# Patient Record
Sex: FEMALE | Race: WHITE | Employment: UNEMPLOYED | URBAN - METROPOLITAN AREA
[De-identification: names, ages, dates, MRNs, and addresses within clinical notes are randomized per-mention and may not be internally consistent; named-entity substitution may affect disease eponyms.]

---

## 2020-07-13 ENCOUNTER — APPOINTMENT (OUTPATIENT)
Dept: GENERAL RADIOLOGY | Age: 55
End: 2020-07-13
Payer: COMMERCIAL

## 2020-07-13 ENCOUNTER — HOSPITAL ENCOUNTER (EMERGENCY)
Age: 55
Discharge: HOME | End: 2020-07-13
Attending: EMERGENCY MEDICINE
Payer: COMMERCIAL

## 2020-07-13 ENCOUNTER — APPOINTMENT (OUTPATIENT)
Dept: CT IMAGING | Age: 55
End: 2020-07-13
Payer: COMMERCIAL

## 2020-07-13 VITALS
TEMPERATURE: 97.8 F | BODY MASS INDEX: 45.52 KG/M2 | WEIGHT: 290 LBS | OXYGEN SATURATION: 98 % | RESPIRATION RATE: 16 BRPM | HEART RATE: 86 BPM | SYSTOLIC BLOOD PRESSURE: 149 MMHG | DIASTOLIC BLOOD PRESSURE: 84 MMHG | HEIGHT: 67 IN

## 2020-07-13 PROCEDURE — 73610 X-RAY EXAM OF ANKLE: CPT

## 2020-07-13 PROCEDURE — 73060 X-RAY EXAM OF HUMERUS: CPT

## 2020-07-13 PROCEDURE — 29105 APPLICATION LONG ARM SPLINT: CPT

## 2020-07-13 PROCEDURE — 99283 EMERGENCY DEPT VISIT LOW MDM: CPT

## 2020-07-13 PROCEDURE — 6360000002 HC RX W HCPCS: Performed by: STUDENT IN AN ORGANIZED HEALTH CARE EDUCATION/TRAINING PROGRAM

## 2020-07-13 PROCEDURE — 72131 CT LUMBAR SPINE W/O DYE: CPT

## 2020-07-13 PROCEDURE — 73110 X-RAY EXAM OF WRIST: CPT

## 2020-07-13 PROCEDURE — 73630 X-RAY EXAM OF FOOT: CPT

## 2020-07-13 PROCEDURE — 71250 CT THORAX DX C-: CPT

## 2020-07-13 PROCEDURE — 96372 THER/PROPH/DIAG INJ SC/IM: CPT

## 2020-07-13 PROCEDURE — 73090 X-RAY EXAM OF FOREARM: CPT

## 2020-07-13 RX ORDER — KETOROLAC TROMETHAMINE 30 MG/ML
15 INJECTION, SOLUTION INTRAMUSCULAR; INTRAVENOUS ONCE
Status: DISCONTINUED | OUTPATIENT
Start: 2020-07-13 | End: 2020-07-13

## 2020-07-13 RX ORDER — KETOROLAC TROMETHAMINE 30 MG/ML
30 INJECTION, SOLUTION INTRAMUSCULAR; INTRAVENOUS ONCE
Status: COMPLETED | OUTPATIENT
Start: 2020-07-13 | End: 2020-07-13

## 2020-07-13 RX ADMIN — KETOROLAC TROMETHAMINE 30 MG: 30 INJECTION, SOLUTION INTRAMUSCULAR at 19:15

## 2020-07-13 ASSESSMENT — ENCOUNTER SYMPTOMS
SHORTNESS OF BREATH: 0
VISUAL CHANGE: 0
COUGH: 0
BACK PAIN: 1
VOMITING: 0

## 2020-07-13 ASSESSMENT — PAIN SCALES - GENERAL
PAINLEVEL_OUTOF10: 10
PAINLEVEL_OUTOF10: 10

## 2020-07-13 ASSESSMENT — PAIN DESCRIPTION - LOCATION: LOCATION: ARM

## 2020-07-13 ASSESSMENT — PAIN DESCRIPTION - PAIN TYPE: TYPE: ACUTE PAIN

## 2020-07-13 NOTE — ED PROVIDER NOTES
Fall   The accident occurred less than 1 hour ago. Fall occurred: Down stairs. She fell from a height of 3 to 5 ft. She landed on a hard floor. The point of impact was the head, right elbow, right wrist, right knee and right shoulder. The pain is present in the right shoulder, right wrist and right elbow. The pain is at a severity of 6/10. The pain is moderate. She was ambulatory at the scene. There was no entrapment after the fall. There was no drug use involved in the accident. There was no alcohol use involved in the accident. Pertinent negatives include no visual change, no fever, no vomiting, no hematuria, no headaches and no loss of consciousness. Review of Systems   Constitutional: Negative for fever. HENT: Negative for congestion. Eyes: Negative for visual disturbance. Respiratory: Negative for cough and shortness of breath. Cardiovascular: Negative for chest pain. Gastrointestinal: Negative for vomiting. Genitourinary: Negative for hematuria. Musculoskeletal: Positive for back pain. Tenderness along right arm-elbow humerus and shoulder- and left middle toe   Skin: Negative for rash. Allergic/Immunologic: Negative for immunocompromised state. Neurological: Negative for loss of consciousness and headaches. Physical Exam  Constitutional:       Appearance: Normal appearance. She is obese. HENT:      Head: Normocephalic and atraumatic. Right Ear: External ear normal.      Left Ear: External ear normal.      Nose: Nose normal.      Mouth/Throat:      Mouth: Mucous membranes are moist.   Eyes:      Conjunctiva/sclera: Conjunctivae normal.      Pupils: Pupils are equal, round, and reactive to light. Neck:      Musculoskeletal: Normal range of motion and neck supple. Cardiovascular:      Rate and Rhythm: Normal rate and regular rhythm. Pulses: Normal pulses. Heart sounds: Normal heart sounds.    Pulmonary:      Effort: Pulmonary effort is normal. Mita Sharma is a 60 y.o. female whom is in no distress. Physical exam reveals tenderness to right shoulder neurovascularly intact distally. Tenderness to left lower back but no midline tenderness no signs of trauma. My plan: Symptomatic and supportive care. Electronically signed by aPvel Dorsey DO on 7/13/20 at 7:30 PM EDT          [MF]   2026 Splint in place patient with good cap refill strength and 2+ radial pulse    [MF]      ED Course User Index  [MF] Pavel Dorsey DO        ED Course as of Jul 13 2028   Mon Jul 13, 2020   1930   ATTENDING PROVIDER ATTESTATION:     I have personally performed and/or participated in the history, exam, medical decision making, and procedures and agree with all pertinent clinical information unless otherwise noted. I have also reviewed and agree with the past medical, family and social history unless otherwise noted. I have discussed this patient in detail with the resident and provided the instruction and education regarding the evidence-based evaluation and treatment of [unfilled]  History: 57-year-old female who presents for evaluation after a mechanical fall down the steps in her home. She denies loss of consciousness. Greatest complaint now is pain to her right shoulder as well as some left lateral back pain    My findings: Mita Sharma is a 60 y.o. female whom is in no distress. Physical exam reveals tenderness to right shoulder neurovascularly intact distally. Tenderness to left lower back but no midline tenderness no signs of trauma. My plan: Symptomatic and supportive care.      Electronically signed by Pavel Dorsey DO on 7/13/20 at 7:30 PM EDT          [MF]   2026 Splint in place patient with good cap refill strength and 2+ radial pulse    [MF]      ED Course User Index  [MF] Pavel Dorsey DO       --------------------------------------------- PAST HISTORY ---------------------------------------------  Past Medical fracture in the proximal ulna. If indicated,   radiographs of the right elbow without for better visualization of   this region. ------------------------- NURSING NOTES AND VITALS REVIEWED ---------------------------  Date / Time Roomed:  7/13/2020  5:24 PM  ED Bed Assignment:  02/02    The nursing notes within the ED encounter and vital signs as below have been reviewed. BP (!) 149/84   Pulse 86   Temp 97.8 °F (36.6 °C) (Oral)   Resp 16   Ht 5' 7\" (1.702 m)   Wt 290 lb (131.5 kg)   SpO2 98%   BMI 45.42 kg/m²   Oxygen Saturation Interpretation: Normal      ------------------------------------------ PROGRESS NOTES ------------------------------------------  8:28 PM EDT  I have spoken with the patient and discussed todays results, in addition to providing specific details for the plan of care and counseling regarding the diagnosis and prognosis. Their questions are answered at this time and they are agreeable with the plan. I discussed at length with them reasons for immediate return here for re evaluation. They will followup with their orthopedic physician by calling their office tomorrow. --------------------------------- ADDITIONAL PROVIDER NOTES ---------------------------------  At this time the patient is without objective evidence of an acute process requiring hospitalization or inpatient management. They have remained hemodynamically stable throughout their entire ED visit and are stable for discharge with outpatient follow-up. The plan has been discussed in detail and they are aware of the specific conditions for emergent return, as well as the importance of follow-up. New Prescriptions    No medications on file       Diagnosis:  1. Fall, initial encounter    2. Pain of right upper extremity    3. Closed fracture of right elbow, initial encounter        Disposition:  Patient's disposition: Discharge to home  Patient's condition is stable.     The patient was seen and evaluated by Dr. Gene Moon and myself.         Rosibel Baca MD  Resident  07/13/20 1458

## 2020-07-13 NOTE — ED NOTES
Bed: 02  Expected date:   Expected time:   Means of arrival:   Comments:  ems     Griselda Craig RN  07/13/20 1098

## 2020-07-14 ENCOUNTER — TELEPHONE (OUTPATIENT)
Dept: ORTHOPEDIC SURGERY | Age: 55
End: 2020-07-14

## 2020-07-14 NOTE — TELEPHONE ENCOUNTER
Pt called for [facility] ED f/u for possible rt elbow fx  Tiny age-indeterminate fracture in the proximal ulna. If indicated,    radiographs of the right elbow without for better visualization of    this region.       Please call pt with appt info: [phone]

## 2020-07-14 NOTE — TELEPHONE ENCOUNTER
Pt seen in ED 7/13 after fall down stairs. XR R radius/ulna:  Impression         Tiny age-indeterminate fracture in the proximal ulna.  If indicated,    radiographs of the right elbow without for better visualization of    this region.

## 2020-07-15 NOTE — TELEPHONE ENCOUNTER
Call placed to pt, appt made for 7/29 at 9:00. Pt verbally confirmed appt date and time. Directions to office given.

## 2020-07-29 ENCOUNTER — HOSPITAL ENCOUNTER (OUTPATIENT)
Dept: GENERAL RADIOLOGY | Age: 55
Discharge: HOME | End: 2020-07-31
Payer: COMMERCIAL

## 2020-07-29 ENCOUNTER — OFFICE VISIT (OUTPATIENT)
Dept: ORTHOPEDIC SURGERY | Age: 55
End: 2020-07-29
Payer: COMMERCIAL

## 2020-07-29 VITALS
DIASTOLIC BLOOD PRESSURE: 89 MMHG | WEIGHT: 290 LBS | BODY MASS INDEX: 45.52 KG/M2 | HEART RATE: 75 BPM | HEIGHT: 67 IN | SYSTOLIC BLOOD PRESSURE: 137 MMHG

## 2020-07-29 PROCEDURE — 3017F COLORECTAL CA SCREEN DOC REV: CPT | Performed by: PHYSICIAN ASSISTANT

## 2020-07-29 PROCEDURE — 73080 X-RAY EXAM OF ELBOW: CPT

## 2020-07-29 PROCEDURE — 99202 OFFICE O/P NEW SF 15 MIN: CPT | Performed by: PHYSICIAN ASSISTANT

## 2020-07-29 PROCEDURE — 1036F TOBACCO NON-USER: CPT | Performed by: PHYSICIAN ASSISTANT

## 2020-07-29 PROCEDURE — G8417 CALC BMI ABV UP PARAM F/U: HCPCS | Performed by: PHYSICIAN ASSISTANT

## 2020-07-29 PROCEDURE — 73090 X-RAY EXAM OF FOREARM: CPT

## 2020-07-29 PROCEDURE — 99203 OFFICE O/P NEW LOW 30 MIN: CPT | Performed by: PHYSICIAN ASSISTANT

## 2020-07-29 PROCEDURE — G8427 DOCREV CUR MEDS BY ELIG CLIN: HCPCS | Performed by: PHYSICIAN ASSISTANT

## 2020-07-29 NOTE — PATIENT INSTRUCTIONS
OK to use right arm for light daily activities and hygiene. No lifting/pushing/pulling with right arm above 1-2 lbs  ACE wrap for comfort  Start outpatient physical therapy  Over the counter Tylenol or Ibuprofen as needed for discomfort    Ice to areas of pain or swelling but make sure to have a barrier on the ice    PT options for you close to home:    [facility]  [address] [facility], [address] [facility]  TEL: [phone]  FAX: [facility] [address]  PHONE: [phone]  FAX: [address] [place] [address] Rd  [address] [place], [address]  [phone]    [facility]  [address] [place], [address] [place]: [phone]  FX: [phone]

## 2020-07-31 NOTE — PROGRESS NOTES
New Patient Orthopaedic Progress Note    Rosalba Mendez is a 60 y.o. female, her YOB: 1965 with the following history as recorded in [facility]:      Patient Active Problem List    Diagnosis Date Noted    Chest pain 02/04/2012    Obesity 02/04/2012    Hypokalemia 02/04/2012     Current Outpatient Medications   Medication Sig Dispense Refill    meloxicam (MOBIC) 7.5 MG tablet   0    metFORMIN (GLUCOPHAGE) 500 MG tablet Take 500 mg by mouth 2 times daily (with meals)      atenolol (TENORMIN) 25 MG tablet Take 25 mg by mouth daily      pantoprazole (PROTONIX) 40 MG tablet take 1 tablet by mouth once daily  0    PARoxetine (PAXIL) 10 MG tablet take 1 tablet by mouth once daily  0    Cyanocobalamin (VITAMIN B 12 PO) Take by mouth daily      acetaminophen (TYLENOL) 325 MG tablet Take 2 tablets by mouth every 4 hours as needed for Pain (For mild pain level 1-3 or for fever > 100.5).  methscopolamine (PAMINE FORTE) 2.5 MG TABS Take 2.5 mg by mouth daily. No current facility-administered medications for this visit. Allergies: Demerol hcl [meperidine]  Past Medical History:   Diagnosis Date    Diabetes mellitus ([address]     GERD (gastroesophageal reflux disease)     Hypertension     IBS (irritable bowel syndrome)      Past Surgical History:   Procedure Laterality Date    CHOLECYSTECTOMY  11/11/2011    FOOT SURGERY      FOOT SURGERY  03/2019    plantar faschia    HEEL SPUR SURGERY  12/2016    JOINT REPLACEMENT      toe joints replacement 3/2017 and 9/2017    JOINT REPLACEMENT      both big toes    PARTIAL HYSTERECTOMY       Family History   Problem Relation Age of Onset    Alzheimer's Disease Father     Heart Disease Mother     Emphysema Mother     Other Other         RESPIRATORY DISEASE     Social History     Tobacco Use    Smoking status: Never Smoker    Smokeless tobacco: Never Used   Substance Use Topics    Alcohol use:  No                             Chief Complaint   Patient presents with   [facility] ED Follow-up     ED F/u R debo cantrell fell down steps on 7/13        SUBJECTIVE: Paulina Lozano is here for initial evaluation for their right elbow. States that they had injured it after falling down stiars. DOI: 7/13/2020. Was seen in ER and XRs revealed a minimally displaced coronoid fracture. they were placed in a long posterior splint and referred here. Denies any other injuries, and no issues with this elbow prior to injury. Denies any numbness or tingling. Pain tolerable currently with medications. Patient states the pain is markedly improved since initial date of injury. Review of Systems   Constitutional: Negative for fever, chills, diaphoresis, appetite change and fatigue. HENT: Negative for dental issues, hearing loss and tinnitus. Negative for congestion, sinus pressure, sneezing, sore throat. Negative for headache. Eyes: Negative for visual disturbance, blurred and double vision. Negative for pain, discharge, redness and itching  Respiratory: Negative for cough, shortness of breath and wheezing. Cardiovascular: Negative for chest pain, palpitations and leg swelling. No dyspnea on exertion   Gastrointestinal:   Negative for nausea, vomiting, abdominal pain, diarrhea, constipation  or black or bloody. Hematologic\Lymphatic:  negative for bleeding, petechiae,   Genitourinary: Negative for hematuria and difficulty urinating. Musculoskeletal: Negative for neck pain and stiffness. Negative for back pain, see HPI  Skin: Negative for pallor, rash and wound. Neurological: Negative for dizziness, tremors, seizures, weakness, light-headedness, no TIA or stroke symptoms. No numbness and headaches. Psychiatric/Behavioral: Negative. OBJECTIVE:      Physical Examination:   General appearance: alert, well appearing, and in no distress,  normal appearing weight.  No visible signs of trauma   Mental status: alert, oriented to person, place, and time, normal mood, behavior, speech, dress, motor activity, and thought processes  Abdomen: soft, nondistended  Resp:   resp easy and unlabored, no audible wheezes note, normal symmetrical expansion of both hemithoraces  Cardiac: distal pulses palpable, skin and extremities well perfused  Neurological: alert, oriented X3, normal speech, no focal findings or movement disorder noted, motor and sensory grossly normal bilaterally, normal muscle tone, no tremors, strength 5/5, normal gait and station  HEENT: normochephalic atraumatic, external ears and eyes normal, sclera normal, neck supple, no nasal discharge. Extremities:   peripheral pulses normal, no edema, redness or tenderness in the calves   Skin: normal coloration, no rashes or open wounds, no suspicious skin lesions noted  Psych: Affect euthymic   Musculoskeletal:   Extremity:  Right Upper Extremity  Skin is clean dry and intact  Mild edema noted to the elbow, no ecchymosis or skin breakdown  Radial pulse palpable, fingers warm with BCR  Flex/extension intact to wrist, thumb and fingers  Finger opposition intact  Finger adduction/abduction intact  Finger crossover intact  Subjectively states sensation intact to radial/medial/ulnar distribution  Active range of motion of the elbow 0-130  Mild discomfort at terminal range of motion  Full supination and pronation. No instability with varus and valgus stress at the elbow       /89 (Site: Left Lower Arm, Position: Sitting)   Pulse 75   Ht 5' 7\" (1.702 m)   Wt 290 lb (131.5 kg)   BMI 45.42 kg/m²      XR: Multiple views of the right radius/ulna and right elbow demonstrates an avulsion fracture the ulnar aspect of the coronoid, no interval displacement in this. This appears to have interval healing from initial x-rays from the ER. No other acute osseous abnormalities identified. ASSESSMENT:     Diagnosis Orders   1.  Closed fracture of proximal end of right ulna, unspecified fracture morphology, initial encounter Amb External Referral To Physical Therapy       Discussion/Plan Had lengthy discussion with patient regarding Her diagnosis, typical prognosis, and expected outcomes. I reviewed the possible complications from the injury itself despite treatment choosen. Closed treatment is recommended, discussed limited restrictions for lifting, pushing, pulling, okay to advance range of motion is stable on exam today. Will refer to physical therapy. Patient is agreeable. Patient to continue with over-the-counter Tylenol or ibuprofen as needed for discomfort, ice and elevation as needed for soreness and swelling. She is agreeable this plan of care and all questions were answered bedside. Electronically signed by Burgess Fareed PA-C on 7/31/2020 at 9:39 AM  Note: This report was completed using [employer] voiced recognition software. Every effort has been made to ensure accuracy; however, inadvertent computerized transcription errors may be present.

## 2020-08-19 ENCOUNTER — OFFICE VISIT (OUTPATIENT)
Dept: ORTHOPEDIC SURGERY | Age: 55
End: 2020-08-19
Payer: COMMERCIAL

## 2020-08-19 VITALS
DIASTOLIC BLOOD PRESSURE: 84 MMHG | TEMPERATURE: 97.6 F | WEIGHT: 290 LBS | SYSTOLIC BLOOD PRESSURE: 148 MMHG | HEIGHT: 67 IN | BODY MASS INDEX: 45.52 KG/M2

## 2020-08-19 PROCEDURE — 1036F TOBACCO NON-USER: CPT | Performed by: PHYSICIAN ASSISTANT

## 2020-08-19 PROCEDURE — G8417 CALC BMI ABV UP PARAM F/U: HCPCS | Performed by: PHYSICIAN ASSISTANT

## 2020-08-19 PROCEDURE — 3017F COLORECTAL CA SCREEN DOC REV: CPT | Performed by: PHYSICIAN ASSISTANT

## 2020-08-19 PROCEDURE — 99213 OFFICE O/P EST LOW 20 MIN: CPT | Performed by: PHYSICIAN ASSISTANT

## 2020-08-19 PROCEDURE — G8427 DOCREV CUR MEDS BY ELIG CLIN: HCPCS | Performed by: PHYSICIAN ASSISTANT

## 2020-08-19 PROCEDURE — 99212 OFFICE O/P EST SF 10 MIN: CPT | Performed by: PHYSICIAN ASSISTANT

## 2020-08-21 NOTE — PROGRESS NOTES
Chief Complaint   Patient presents with    Follow-up     F/u R prox ulna fx doi 7/13 fell down steps. states she is not doing therapy d/t insurance issues       SUBJECTIVE: Gordon Vo is a 60 y.o. female who presents to office for follow-up on her right elbow. Her injury was sustained on 7/13/2020 when she had fallen down the stairs on an outstretched arm. Patient had sustained an avulsion fracture of the proximal ulna at the coronoid process without subsequent instability of her elbow. Patient is now 5 weeks from initial date of injury. She feels she is doing well. Her pain continues to improve. She does note increased tenderness and sensitivity when she rests her right elbow on hard surfaces. She denies numbness and tingling to the right upper extremity. She states that her range of motion is significantly improved. She has started to use the arm for light ADLs. States that she has tried to lift heavier objects such as a gallon of milk but this is still painful. Patient was prescribed occupational therapy at the last office visit. She is not attended due to insurance issues and feels she is progressing well on her own and would like to hold off on therapy at this time. .    Review of Systems   Constitutional: Negative for fever, chills, diaphoresis, appetite change and fatigue. HENT: Negative for dental issues, hearing loss and tinnitus. Negative for congestion, sinus pressure, sneezing, sore throat. Negative for headache. Eyes: Negative for visual disturbance, blurred and double vision. Negative for pain, discharge, redness and itching  Respiratory: Negative for cough, shortness of breath and wheezing. Cardiovascular: Negative for chest pain, palpitations and leg swelling. No dyspnea on exertion   Gastrointestinal:   Negative for nausea, vomiting, abdominal pain, diarrhea, constipation  or black or bloody.   Hematologic\Lymphatic:  negative for bleeding, petechiae,   Genitourinary: Negative for hematuria and difficulty urinating. Musculoskeletal: Negative for neck pain and stiffness. Negative for back pain, see HPI  Skin: Negative for pallor, rash and wound. Neurological: Negative for dizziness, tremors, seizures, weakness, light-headedness, no TIA or stroke symptoms. No numbness and headaches. Psychiatric/Behavioral: Negative. OBJECTIVE:      Physical Examination:   General appearance: alert, well appearing, and in no distress,  normal appearing weight. No visible signs of trauma   Mental status: alert, oriented to person, place, and time, normal mood, behavior, speech, dress, motor activity, and thought processes  Abdomen: soft, nondistended  Resp:   resp easy and unlabored, no audible wheezes note, normal symmetrical expansion of both hemithoraces  Cardiac: distal pulses palpable, skin and extremities well perfused  Neurological: alert, oriented X3, normal speech, no focal findings or movement disorder noted, motor and sensory grossly normal bilaterally, normal muscle tone, no tremors, strength 5/5, normal gait and station  HEENT: normochephalic atraumatic, external ears and eyes normal, sclera normal, neck supple, no nasal discharge. Extremities:   peripheral pulses normal, no edema, redness or tenderness in the calves   Skin: normal coloration, no rashes or open wounds, no suspicious skin lesions noted  Psych: Affect euthymic   Musculoskeletal:   Extremity:  Right Upper Extremity  Skin is clean dry and intact  Edema has resolved  Radial pulse palpable, fingers warm with BCR  Flex/extension intact to wrist, thumb and fingers  Finger opposition intact  Finger adduction/abduction intact  Finger crossover intact  Subjectively states sensation intact to radial/medial/ulnar distribution  Active range of motion of the elbow 0-130 without pain  Mild tenderness palpation about the proximal ulna on the medial aspect over area of prior coronoid fracture  Full supination and pronation.   No instability with varus and valgus stress at the elbow    BP (!) 148/84   Temp 97.6 °F (36.4 °C)   Ht 5' 7\" (1.702 m)   Wt 290 lb (131.5 kg)   BMI 45.42 kg/m²      No new x-rays were obtained today     ASSESSMENT:     Diagnosis Orders   1. Closed fracture of proximal end of right ulna with routine healing, unspecified fracture morphology, subsequent encounter       PLAN:  Discussed with the patient her physical exam findings, her range of motion is now pain-free but continues to have mild sensitivity at the area of coronoid fracture. There is no instability with valgus stress of the elbow at 0 degrees or 30 degrees of flexion  Discussed with the patient to continue to modify some activity to the right upper extremity and progress with pain as her guide  Recommended to continue to use over-the-counter Tylenol or ibuprofen as needed for discomfort  Recommended to continue with ice for after increased activity. Patient expressed understanding, I am agreeable with holding off on therapy as she is been progressing very well on her own. Patient will follow-up with orthopedics on an as-needed basis and call with any questions or concerns. Electronically signed by Amber Ross PA-C on 8/21/2020 at 8:33 AM  Note: This report was completed using computerMedisync [employer] voiced recognition software. Every effort has been made to ensure accuracy; however, inadvertent computerized transcription errors may be present.

## 2021-03-06 ENCOUNTER — APPOINTMENT (OUTPATIENT)
Dept: GENERAL RADIOLOGY | Age: 56
End: 2021-03-06
Payer: COMMERCIAL

## 2021-03-06 ENCOUNTER — HOSPITAL ENCOUNTER (EMERGENCY)
Age: 56
Discharge: HOME | End: 2021-03-06
Attending: EMERGENCY MEDICINE
Payer: COMMERCIAL

## 2021-03-06 VITALS
TEMPERATURE: 97.5 F | RESPIRATION RATE: 16 BRPM | BODY MASS INDEX: 45.99 KG/M2 | SYSTOLIC BLOOD PRESSURE: 135 MMHG | DIASTOLIC BLOOD PRESSURE: 82 MMHG | OXYGEN SATURATION: 98 % | HEIGHT: 67 IN | HEART RATE: 82 BPM | WEIGHT: 293 LBS

## 2021-03-06 DIAGNOSIS — R10.12 LEFT UPPER QUADRANT ABDOMINAL PAIN: Primary | ICD-10-CM

## 2021-03-06 DIAGNOSIS — R10.13 ABDOMINAL PAIN, EPIGASTRIC: ICD-10-CM

## 2021-03-06 LAB
ALBUMIN SERPL-MCNC: 4.1 G/DL (ref 3.5–5.2)
ALP BLD-CCNC: 115 U/L (ref 35–104)
ALT SERPL-CCNC: 26 U/L (ref 0–32)
ANION GAP SERPL CALCULATED.3IONS-SCNC: 9 MMOL/L (ref 7–16)
AST SERPL-CCNC: 24 U/L (ref 0–31)
BASOPHILS ABSOLUTE: 0.03 E9/L (ref 0–0.2)
BASOPHILS RELATIVE PERCENT: 0.4 % (ref 0–2)
BILIRUB SERPL-MCNC: 0.3 MG/DL (ref 0–1.2)
BUN BLDV-MCNC: 19 MG/DL (ref 6–20)
CALCIUM SERPL-MCNC: 9.1 MG/DL (ref 8.6–10.2)
CHLORIDE BLD-SCNC: 103 MMOL/L (ref 98–107)
CO2: 28 MMOL/L (ref 22–29)
CREAT SERPL-MCNC: 1.2 MG/DL (ref 0.5–1)
EKG ATRIAL RATE: 78 BPM
EKG P AXIS: 40 DEGREES
EKG P-R INTERVAL: 150 MS
EKG Q-T INTERVAL: 424 MS
EKG QRS DURATION: 86 MS
EKG QTC CALCULATION (BAZETT): 483 MS
EKG R AXIS: 26 DEGREES
EKG T AXIS: 36 DEGREES
EKG VENTRICULAR RATE: 78 BPM
EOSINOPHILS ABSOLUTE: 0.13 E9/L (ref 0.05–0.5)
EOSINOPHILS RELATIVE PERCENT: 1.7 % (ref 0–6)
GFR AFRICAN AMERICAN: 56
GFR NON-AFRICAN AMERICAN: 46 ML/MIN/1.73
GLUCOSE BLD-MCNC: 136 MG/DL (ref 74–99)
HCT VFR BLD CALC: 43.3 % (ref 34–48)
HEMOGLOBIN: 13.1 G/DL (ref 11.5–15.5)
IMMATURE GRANULOCYTES #: 0.02 E9/L
IMMATURE GRANULOCYTES %: 0.3 % (ref 0–5)
LIPASE: 28 U/L (ref 13–60)
LYMPHOCYTES ABSOLUTE: 1.71 E9/L (ref 1.5–4)
LYMPHOCYTES RELATIVE PERCENT: 21.8 % (ref 20–42)
MCH RBC QN AUTO: 27.9 PG (ref 26–35)
MCHC RBC AUTO-ENTMCNC: 30.3 % (ref 32–34.5)
MCV RBC AUTO: 92.1 FL (ref 80–99.9)
MONOCYTES ABSOLUTE: 0.57 E9/L (ref 0.1–0.95)
MONOCYTES RELATIVE PERCENT: 7.3 % (ref 2–12)
NEUTROPHILS ABSOLUTE: 5.38 E9/L (ref 1.8–7.3)
NEUTROPHILS RELATIVE PERCENT: 68.5 % (ref 43–80)
PDW BLD-RTO: 14.9 FL (ref 11.5–15)
PLATELET # BLD: 265 E9/L (ref 130–450)
PMV BLD AUTO: 10.8 FL (ref 7–12)
POTASSIUM REFLEX MAGNESIUM: 4.4 MMOL/L (ref 3.5–5)
RBC # BLD: 4.7 E12/L (ref 3.5–5.5)
SODIUM BLD-SCNC: 140 MMOL/L (ref 132–146)
TOTAL PROTEIN: 7 G/DL (ref 6.4–8.3)
TROPONIN: <0.01 NG/ML (ref 0–0.03)
WBC # BLD: 7.8 E9/L (ref 4.5–11.5)

## 2021-03-06 PROCEDURE — 83690 ASSAY OF LIPASE: CPT

## 2021-03-06 PROCEDURE — 99284 EMERGENCY DEPT VISIT MOD MDM: CPT

## 2021-03-06 PROCEDURE — 85025 COMPLETE CBC W/AUTO DIFF WBC: CPT

## 2021-03-06 PROCEDURE — 96361 HYDRATE IV INFUSION ADD-ON: CPT

## 2021-03-06 PROCEDURE — 96360 HYDRATION IV INFUSION INIT: CPT

## 2021-03-06 PROCEDURE — 80053 COMPREHEN METABOLIC PANEL: CPT

## 2021-03-06 PROCEDURE — 2580000003 HC RX 258: Performed by: STUDENT IN AN ORGANIZED HEALTH CARE EDUCATION/TRAINING PROGRAM

## 2021-03-06 PROCEDURE — 93005 ELECTROCARDIOGRAM TRACING: CPT | Performed by: STUDENT IN AN ORGANIZED HEALTH CARE EDUCATION/TRAINING PROGRAM

## 2021-03-06 PROCEDURE — 36415 COLL VENOUS BLD VENIPUNCTURE: CPT

## 2021-03-06 PROCEDURE — 71045 X-RAY EXAM CHEST 1 VIEW: CPT

## 2021-03-06 PROCEDURE — 6370000000 HC RX 637 (ALT 250 FOR IP): Performed by: STUDENT IN AN ORGANIZED HEALTH CARE EDUCATION/TRAINING PROGRAM

## 2021-03-06 PROCEDURE — 84484 ASSAY OF TROPONIN QUANT: CPT

## 2021-03-06 RX ORDER — 0.9 % SODIUM CHLORIDE 0.9 %
500 INTRAVENOUS SOLUTION INTRAVENOUS ONCE
Status: COMPLETED | OUTPATIENT
Start: 2021-03-06 | End: 2021-03-06

## 2021-03-06 RX ORDER — SUCRALFATE ORAL 1 G/10ML
1 SUSPENSION ORAL 4 TIMES DAILY
Qty: 1200 ML | Refills: 0 | Status: SHIPPED | OUTPATIENT
Start: 2021-03-06 | End: 2021-04-05

## 2021-03-06 RX ORDER — FAMOTIDINE 20 MG/1
20 TABLET, FILM COATED ORAL ONCE
Status: COMPLETED | OUTPATIENT
Start: 2021-03-06 | End: 2021-03-06

## 2021-03-06 RX ADMIN — FAMOTIDINE 20 MG: 20 TABLET ORAL at 16:12

## 2021-03-06 RX ADMIN — LIDOCAINE HYDROCHLORIDE: 20 SOLUTION ORAL; TOPICAL at 16:12

## 2021-03-06 RX ADMIN — SODIUM CHLORIDE 500 ML: 9 INJECTION, SOLUTION INTRAVENOUS at 16:14

## 2021-03-06 ASSESSMENT — ENCOUNTER SYMPTOMS
EYE DISCHARGE: 0
EYE PAIN: 0
COUGH: 0
WHEEZING: 0
EYE REDNESS: 0
SINUS PRESSURE: 0
ABDOMINAL PAIN: 1
SHORTNESS OF BREATH: 0
VOMITING: 0
DIARRHEA: 1
BLOOD IN STOOL: 0
BACK PAIN: 0
SORE THROAT: 0
NAUSEA: 0

## 2021-03-06 ASSESSMENT — PAIN DESCRIPTION - PAIN TYPE: TYPE: ACUTE PAIN

## 2021-03-06 NOTE — ED PROVIDER NOTES
pulses. Heart sounds: Normal heart sounds. Pulmonary:      Effort: Pulmonary effort is normal.      Breath sounds: Normal breath sounds. Abdominal:      General: Abdomen is flat. Bowel sounds are normal.      Palpations: Abdomen is soft. Tenderness: There is abdominal tenderness (Left upper quadrant epigastric tenderness with deep palpation) in the epigastric area and left upper quadrant. Musculoskeletal: Normal range of motion. Skin:     General: Skin is warm and dry. Capillary Refill: Capillary refill takes less than 2 seconds. Neurological:      Mental Status: She is alert. Procedures     MDM     ED Course as of Mar 06 2152   Sat Mar 06, 2021   1705 She has had pain mildly improved with GI cocktail and fluids. CBC, CMP, lipase, troponin, EKG and chest x-ray were all negative. Acute intra-abdominal or cardiac process less likely. Patient symptoms seem to worsen after eating, consistent with possible gastritis or ulcer type picture considering she has some diarrhea associated with it as well. She is already on pantoprazole at home and sees Dr. Soledad Diaz from gastroenterology and her last EGD was three years ago. Will discharge patient home on Carafate, and have her increase her dose of Protonix as well as follow-up with him. Return precautions given. [JG]   5161 ATTENDING PHYSICIAN ATTESTATION:     I have personally performed and/or participated in the history, exam, medical decision making, and procedures and agree with all pertinent clinical information. I have also reviewed and agree with the past medical, family and social history unless otherwise noted. I have discussed this patient in detail with the resident, and provided the instruction and education regarding abdominal pain. My findings/Plan: 58-year-old female presents for evaluation of left upper quadrant abdominal pain for several weeks that until today only heard briefly after eating.   Today the pain became constant. Patient is reported history of GERD for which she takes pantoprazole. She reports she is followed by Dr. Maite Hewitt and her last EGD was 3 years ago. Patient with left upper quadrant tenderness minor vague abdominal exam unremarkable labs and imaging unremarkable. Symptoms better after GI cocktail likely gastritis/peptic ulcer disease. Patient discharged home adding on medications with close follow-up with gastroenterology and return precautions          [MF]      ED Course User Index  [JG] Farheen Blanchard MD  [MF] Kerry Albarran DO        ED Course as of Mar 06 2152   Sat Mar 06, 2021   1705 She has had pain mildly improved with GI cocktail and fluids. CBC, CMP, lipase, troponin, EKG and chest x-ray were all negative. Acute intra-abdominal or cardiac process less likely. Patient symptoms seem to worsen after eating, consistent with possible gastritis or ulcer type picture considering she has some diarrhea associated with it as well. She is already on pantoprazole at home and sees Dr. Austin Mcneil from gastroenterology and her last EGD was three years ago. Will discharge patient home on Carafate, and have her increase her dose of Protonix as well as follow-up with him. Return precautions given. [JG]   7823 ATTENDING PHYSICIAN ATTESTATION:     I have personally performed and/or participated in the history, exam, medical decision making, and procedures and agree with all pertinent clinical information. I have also reviewed and agree with the past medical, family and social history unless otherwise noted. I have discussed this patient in detail with the resident, and provided the instruction and education regarding abdominal pain. My findings/Plan: 67-year-old female presents for evaluation of left upper quadrant abdominal pain for several weeks that until today only heard briefly after eating. Today the pain became constant.   Patient is reported history of GERD for which she takes pantoprazole. She reports she is followed by Dr. Arianna Lares and her last EGD was 3 years ago. Patient with left upper quadrant tenderness minor vague abdominal exam unremarkable labs and imaging unremarkable. Symptoms better after GI cocktail likely gastritis/peptic ulcer disease. Patient discharged home adding on medications with close follow-up with gastroenterology and return precautions          []      ED Course User Index  [JG] Oumar Dowling MD  [] Aldo Vera, DO     EKG: This EKG is signed by emergency department physician. Rate: 78  Rhythm: Sinus  Interpretation: prolonged QT interval  Comparison: stable as compared to patient's most recent EKG     --------------------------------------------- PAST HISTORY ---------------------------------------------  Past Medical History:  has a past medical history of Diabetes mellitus ([facility] [address] GERD (gastroesophageal reflux disease), Hypertension, and IBS (irritable bowel syndrome). Past Surgical History:  has a past surgical history that includes Cholecystectomy (11/11/2011); Foot surgery; partial hysterectomy (cervix not removed); Heel spur surgery (12/2016); joint replacement; joint replacement; and Foot surgery (03/2019). Social History:  reports that she has never smoked. She has never used smokeless tobacco. She reports that she does not drink alcohol or use drugs. Family History: family history includes Alzheimer's Disease in her father; Emphysema in her mother; Heart Disease in her mother; Other in an other family member. The patients home medications have been reviewed.     Allergies: Demerol hcl [meperidine]    -------------------------------------------------- RESULTS -------------------------------------------------  Labs:  Results for orders placed or performed during the hospital encounter of 03/06/21   CBC Auto Differential   Result Value Ref Range    WBC 7.8 4.5 - 11.5 E9/L    RBC 4.70 3.50 - 5.50 E12/L    Hemoglobin 13.1 11.5 - 15.5 g/dL    Hematocrit 43.3 34.0 - 48.0 %    MCV 92.1 80.0 - 99.9 fL    MCH 27.9 26.0 - 35.0 pg    MCHC 30.3 (L) 32.0 - 34.5 %    RDW 14.9 11.5 - 15.0 fL    Platelets 015 712 - 508 E9/L    MPV 10.8 7.0 - 12.0 fL    Neutrophils % 68.5 43.0 - 80.0 %    Immature Granulocytes % 0.3 0.0 - 5.0 %    Lymphocytes % 21.8 20.0 - 42.0 %    Monocytes % 7.3 2.0 - 12.0 %    Eosinophils % 1.7 0.0 - 6.0 %    Basophils % 0.4 0.0 - 2.0 %    Neutrophils Absolute 5.38 1.80 - 7.30 E9/L    Immature Granulocytes # 0.02 E9/L    Lymphocytes Absolute 1.71 1.50 - 4.00 E9/L    Monocytes Absolute 0.57 0.10 - 0.95 E9/L    Eosinophils Absolute 0.13 0.05 - 0.50 E9/L    Basophils Absolute 0.03 0.00 - 0.20 E9/L   Comprehensive Metabolic Panel w/ Reflex to MG   Result Value Ref Range    Sodium 140 132 - 146 mmol/L    Potassium reflex Magnesium 4.4 3.5 - 5.0 mmol/L    Chloride 103 98 - 107 mmol/L    CO2 28 22 - 29 mmol/L    Anion Gap 9 7 - 16 mmol/L    Glucose 136 (H) 74 - 99 mg/dL    BUN 19 6 - 20 mg/dL    CREATININE 1.2 (H) 0.5 - 1.0 mg/dL    GFR Non-African American 46 >=60 mL/min/1.73    GFR African American 56     Calcium 9.1 8.6 - 10.2 mg/dL    Total Protein 7.0 6.4 - 8.3 g/dL    Albumin 4.1 3.5 - 5.2 g/dL    Total Bilirubin 0.3 0.0 - 1.2 mg/dL    Alkaline Phosphatase 115 (H) 35 - 104 U/L    ALT 26 0 - 32 U/L    AST 24 0 - 31 U/L   Lipase   Result Value Ref Range    Lipase 28 13 - 60 U/L   Troponin   Result Value Ref Range    Troponin <0.01 0.00 - 0.03 ng/mL   EKG 12 Lead   Result Value Ref Range    Ventricular Rate 78 BPM    Atrial Rate 78 BPM    P-R Interval 150 ms    QRS Duration 86 ms    Q-T Interval 424 ms    QTc Calculation (Bazett) 483 ms    P Axis 40 degrees    R Axis 26 degrees    T Axis 36 degrees       Radiology:  XR CHEST PORTABLE   Final Result   No indication for acute cardiopulmonary process.              ------------------------- NURSING NOTES AND VITALS REVIEWED ---------------------------  Date / Time Roomed:  3/6/2021  3:38 PM ED Bed Assignment:  22/22    The nursing notes within the ED encounter and vital signs as below have been reviewed. /82   Pulse 82   Temp 97.5 °F (36.4 °C)   Resp 16   Ht 5' 7\" (1.702 m)   Wt 300 lb (136.1 kg)   SpO2 98%   BMI 46.99 kg/m²   Oxygen Saturation Interpretation: Normal      ------------------------------------------ PROGRESS NOTES ------------------------------------------  9:52 PM EST  I have spoken with the patient and discussed todays results, in addition to providing specific details for the plan of care and counseling regarding the diagnosis and prognosis. Their questions are answered at this time and they are agreeable with the plan. I discussed at length with them reasons for immediate return here for re evaluation. They will followup with their Gastroenterologist by calling their office on Monday.      --------------------------------- ADDITIONAL PROVIDER NOTES ---------------------------------  At this time the patient is without objective evidence of an acute process requiring hospitalization or inpatient management. They have remained hemodynamically stable throughout their entire ED visit and are stable for discharge with outpatient follow-up. The plan has been discussed in detail and they are aware of the specific conditions for emergent return, as well as the importance of follow-up. Discharge Medication List as of 3/6/2021  5:48 PM      START taking these medications    Details   sucralfate (CARAFATE) 1 GM/10ML suspension Take 10 mLs by mouth 4 times daily, Disp-1200 mL, R-0Normal             Diagnosis:  1. Left upper quadrant abdominal pain    2. Abdominal pain, epigastric        Disposition:  Patient's disposition: Discharge to home  Patient's condition is stable.     Americo Alexandre MD PGY-1  3/6/2021 9:53 PM           Moise Davis MD  Resident  03/06/21 [id number]

## 2022-03-15 ENCOUNTER — TELEPHONE (OUTPATIENT)
Dept: ADMINISTRATIVE | Age: 57
End: 2022-03-15

## 2022-03-18 NOTE — TELEPHONE ENCOUNTER
Call to pt no new injury. Pain in elbow and tingling in hand. Scheduled appt.   Future Appointments   Date Time Provider Sid Danielle   4/6/2022  8:30 AM Alfonso Bernardo MD [facility]   5/2/2022  1:30 PM DO SHAKEEL Weller [facility]

## 2022-04-06 ENCOUNTER — OFFICE VISIT (OUTPATIENT)
Dept: ORTHOPEDIC SURGERY | Age: 57
End: 2022-04-06
Payer: COMMERCIAL

## 2022-04-06 ENCOUNTER — HOSPITAL ENCOUNTER (OUTPATIENT)
Dept: GENERAL RADIOLOGY | Age: 57
Discharge: HOME | End: 2022-04-08
Payer: COMMERCIAL

## 2022-04-06 DIAGNOSIS — S52.091A CLOSED COMMINUTED FRACTURE OF PROXIMAL END OF RIGHT ULNA, INITIAL ENCOUNTER: ICD-10-CM

## 2022-04-06 DIAGNOSIS — G56.21 ULNAR NERVE COMPRESSION, RIGHT: ICD-10-CM

## 2022-04-06 DIAGNOSIS — G56.91 NEUROPATHY OF RIGHT UPPER EXTREMITY: Primary | ICD-10-CM

## 2022-04-06 PROCEDURE — 1036F TOBACCO NON-USER: CPT | Performed by: PHYSICIAN ASSISTANT

## 2022-04-06 PROCEDURE — 99213 OFFICE O/P EST LOW 20 MIN: CPT | Performed by: PHYSICIAN ASSISTANT

## 2022-04-06 PROCEDURE — 73080 X-RAY EXAM OF ELBOW: CPT

## 2022-04-06 PROCEDURE — G8427 DOCREV CUR MEDS BY ELIG CLIN: HCPCS | Performed by: PHYSICIAN ASSISTANT

## 2022-04-06 PROCEDURE — G8417 CALC BMI ABV UP PARAM F/U: HCPCS | Performed by: PHYSICIAN ASSISTANT

## 2022-04-06 PROCEDURE — 99212 OFFICE O/P EST SF 10 MIN: CPT

## 2022-04-06 PROCEDURE — 73090 X-RAY EXAM OF FOREARM: CPT

## 2022-04-06 PROCEDURE — 3017F COLORECTAL CA SCREEN DOC REV: CPT | Performed by: PHYSICIAN ASSISTANT

## 2022-04-06 NOTE — PROGRESS NOTES
Savanah Mitchell is a 59 y.o. female who presents for follow up of R Prox Ulna Fx    SURGEON: Dr. Kalani Monk MD  Date of Injury/Surgery: July 2020  Date last seen in office: 8-    Symptoms: worse  New complaints: Pt expressed having numbness and tingling in the Right Hand. Pt has decreased ADL's with Right hand due to inability to grasp items. Pt has decreased sleep quality due to increased symptoms. Pt has increased crepitus within the Right [address] Joint. Pt has increased crepitus within the Right Elbow. Pt has attempted to take OTC medication but only receives minor symptom relief. Weightbearing: right upper Full weight bearing      Assistive device No Device  Participating in therapy (location if yes)?  no    Refills Needed: None  Order/Referral Needed: N/A
identified. Assessment:   Diagnosis Orders   1. Neuropathy of right upper extremity  EMG   2. Ulnar nerve compression, right  EMG       Plan:   Reviewed x-rays with patient today in office    Weightbearing as tolerated right upper extremity   EMG right upper extremity ordered today, will call with results    Follow up pending EMG results     Electronically signed by Lucero Lewis PA-C on 4/6/2022 at 9:03 AM  Note: This report was completed using FRUCT voiced recognition software. Every effort has been made to ensure accuracy; however, inadvertent computerized transcription errors may be present.

## 2022-04-12 ENCOUNTER — TELEPHONE (OUTPATIENT)
Dept: ORTHOPEDIC SURGERY | Age: 57
End: 2022-04-12

## 2022-04-12 NOTE — TELEPHONE ENCOUNTER
VM from pt stating no one called yet to schedule EMG. Call back to pt advised EMG ordered and she can call Central scheduling @ [phone]. Any issue scheduling please call back. Call back to let us know when complete.

## 2022-04-25 ENCOUNTER — HOSPITAL ENCOUNTER (OUTPATIENT)
Dept: NEUROLOGY | Age: 57
Discharge: HOME | End: 2022-04-25
Payer: COMMERCIAL

## 2022-04-25 VITALS — HEIGHT: 67 IN | WEIGHT: 280 LBS | BODY MASS INDEX: 43.95 KG/M2

## 2022-04-25 DIAGNOSIS — G56.91 NEUROPATHY OF RIGHT UPPER EXTREMITY: ICD-10-CM

## 2022-04-25 DIAGNOSIS — G56.21 ULNAR NERVE COMPRESSION, RIGHT: ICD-10-CM

## 2022-04-25 DIAGNOSIS — G56.91 NEUROPATHY OF RIGHT UPPER EXTREMITY: Primary | ICD-10-CM

## 2022-04-25 PROCEDURE — 95911 NRV CNDJ TEST 9-10 STUDIES: CPT

## 2022-04-25 PROCEDURE — 95886 MUSC TEST DONE W/N TEST COMP: CPT

## 2022-04-25 NOTE — PROCEDURES
[facility]  [address] [facility], [address] [place] [address]  Phone: [phone]  Fax: [phone]      Referring Provider: Whitney Muniz*  Primary Care Physician: Dominic Shields DO  Patient Name: Tawanda Melo  Patient YOB: 1965  Gender: female  BMI: Body mass index is 43.85 kg/m². Height 5' 7\" (1.702 m), weight 280 lb (127 kg), not currently breastfeeding. 4/25/2022    Reason for referral: EMG    Description of clinical problem:   No chief complaint on file. Pain Yes   ; Numbness/tingling  No; Weakness  No       Brief physical exam:   Sensory deficit No; Weakness No; Atrophy  No; Reflex abnormality No      Study Limitations:  none    Summary of Findings:   Nerve conduction studies:   · The following nerve conduction studies were abnormal:   · Borderline elevation of right CSI  · All other nerve conduction studies listed in the table above were normal in latency, amplitude and conduction velocity. [address]   Electrodiagnostic Laboratory  [place]        Full Name: Arely Wells Gender: Female  MRN: [id number] YOB: 1965  Location[de-identified] Outpt. Visit Date: 4/25/2022 09:58  Age: 59 Years 3 Months Old  Examining Physician: Dr. Cherry Hernadez  Referring Physician: Luci Don PA-C/Dr. Charlie Palacios  Technician: Brittni Vital   Height: 5 feet 7 inch  Weight: 280 lbs  Notes: Neuropathy of rt. upper ext. M56.91, Ulnar nerve compression, rt. G56.21      Motor NCS      Nerve / Sites Lat. Amplitude Distance Lat Diff Velocity Temp. Amp. 1-2    ms mV cm ms m/s °C %   R Median - APB      Wrist 3.80 11.4 8   33.5 100      Elbow 7.50 11.0 22 3.70 59 33.6 96.6   R Ulnar - ADM      Wrist 3.13 14.7 8   33.6 100      B. Elbow 6.61 12.6 18 3.49 52 33.6 85.3      A. Elbow 8.23 11.8 10 1.61 62 33.6 80.3       Sensory NCS      Nerve / Sites Onset Lat Peak Lat PP Amp Distance Velocity Temp.    ms ms µV cm m/s °C   R policies and procedures which can be provided upon request. All abnormal values are identified in the table.  Laboratory normal values can also be provided upon request.       Cc: Crys Innocent, DO

## 2022-04-25 NOTE — PROGRESS NOTES
Call placed to patient to review EMG results, which showed mild carpal tunnel syndrome, but otherwise WNL. Pt still experiencing paresthesias near the elbow, so will send to UE specialist for second opinion.

## 2022-06-06 ENCOUNTER — OFFICE VISIT (OUTPATIENT)
Dept: ORTHOPEDIC SURGERY | Age: 57
End: 2022-06-06

## 2022-06-06 VITALS — HEIGHT: 67 IN | WEIGHT: 293 LBS | BODY MASS INDEX: 45.99 KG/M2

## 2022-06-06 DIAGNOSIS — R20.0 NUMBNESS AND TINGLING OF BOTH UPPER EXTREMITIES: Primary | ICD-10-CM

## 2022-06-06 DIAGNOSIS — S46.911A STRAIN OF RIGHT SHOULDER, INITIAL ENCOUNTER: ICD-10-CM

## 2022-06-06 DIAGNOSIS — R20.2 NUMBNESS AND TINGLING OF BOTH UPPER EXTREMITIES: Primary | ICD-10-CM

## 2022-06-06 PROCEDURE — G8417 CALC BMI ABV UP PARAM F/U: HCPCS | Performed by: ORTHOPAEDIC SURGERY

## 2022-06-06 PROCEDURE — 1036F TOBACCO NON-USER: CPT | Performed by: ORTHOPAEDIC SURGERY

## 2022-06-06 PROCEDURE — 99204 OFFICE O/P NEW MOD 45 MIN: CPT | Performed by: ORTHOPAEDIC SURGERY

## 2022-06-06 PROCEDURE — 20610 DRAIN/INJ JOINT/BURSA W/O US: CPT | Performed by: ORTHOPAEDIC SURGERY

## 2022-06-06 PROCEDURE — G8427 DOCREV CUR MEDS BY ELIG CLIN: HCPCS | Performed by: ORTHOPAEDIC SURGERY

## 2022-06-06 PROCEDURE — 3017F COLORECTAL CA SCREEN DOC REV: CPT | Performed by: ORTHOPAEDIC SURGERY

## 2022-06-06 RX ORDER — TRIAMCINOLONE ACETONIDE 40 MG/ML
80 INJECTION, SUSPENSION INTRA-ARTICULAR; INTRAMUSCULAR ONCE
Status: COMPLETED | OUTPATIENT
Start: 2022-06-06 | End: 2022-06-06

## 2022-06-06 RX ORDER — HYDROCODONE BITARTRATE AND ACETAMINOPHEN 5; 325 MG/1; MG/1
TABLET ORAL
COMMUNITY
Start: 2022-05-27

## 2022-06-06 RX ADMIN — TRIAMCINOLONE ACETONIDE 80 MG: 40 INJECTION, SUSPENSION INTRA-ARTICULAR; INTRAMUSCULAR at 09:45

## 2022-06-06 NOTE — PROGRESS NOTES
Department of Orthopedic Surgery  History and Physical      CHIEF COMPLAINT: Right upper extremity pain    HISTORY OF PRESENT ILLNESS:                The patient is a right-hand-dominant 59 y.o. female who presents with bilateral hand paresthesias and right upper arm burning and shoulder pain. Reports that July 2020 she fell down 2 steps and \"broke my elbow\". Since that time she has been experiencing paresthesias to the right upper extremity. She is also complaining of a 2-month history of bilateral hand numbness that ebbs and flows throughout the day. There are no specific exacerbating or relieving factors nor particular times of the day that the paresthesias are worse. Denies symptoms worse at night. Patient complaining of upper arm burning and aching pain. She has trialed over-the-counter anti-inflammatories and chiropractic medicine without much relief in her symptoms. Patient had an EMG/NCS dated 4/5/2022    Right median nerve motor latency: 3.80 ms  Right ulnar nerve velocity: Above the elbow 62 ms, below the elbow 52 ms  Right median nerve sensory latency: 2.81 ms    EMG portion of the exam demonstrates borderline median nerve mononeuropathy. Past Medical History:        Diagnosis Date    Diabetes mellitus ([facility] [address]     GERD (gastroesophageal reflux disease)     Hypertension     IBS (irritable bowel syndrome)      Past Surgical History:        Procedure Laterality Date    CHOLECYSTECTOMY  11/11/2011    FOOT SURGERY      FOOT SURGERY  03/2019    plantar faschia    HEEL SPUR SURGERY  12/2016    JOINT REPLACEMENT      toe joints replacement 3/2017 and 9/2017    JOINT REPLACEMENT      both big toes    PARTIAL HYSTERECTOMY       Current Medications:   No current facility-administered medications for this visit. Allergies:  Demerol hcl [meperidine]    Social History:   TOBACCO:   reports that she has never smoked.  She has never used smokeless tobacco.  ETOH:   reports no history of alcohol use.  DRUGS:   reports no history of drug use. ACTIVITIES OF DAILY LIVING:    OCCUPATION:    Family History:       Problem Relation Age of Onset    Alzheimer's Disease Father     Heart Disease Mother     Emphysema Mother     Other Other         RESPIRATORY DISEASE       REVIEW OF SYSTEMS:  Constitutional: Negative  Cardiovascular: Negative  Respiratory: Negative  Gastrointestinal: Negative  Musculoskeletal: See HPI  Neurological: See HPI  Behavioral/psychological: Negative    PHYSICAL EXAM:    VITALS:  Ht 5' 7\" (1.702 m)   Wt 295 lb (133.8 kg)   BMI 46.20 kg/m²   CONSTITUTIONAL:  awake, alert, cooperative, no apparent distress, and appears stated age  EYES:  Lids and lashes normal, pupils equal, round and reactive to light, extra ocular muscles intact, sclera clear, conjunctiva normal  ENT:  Normocephalic, without obvious abnormality, atraumatic, sinuses nontender on palpation, external ears without lesions, oral pharynx with moist mucus membranes, tonsils without erythema or exudates, gums normal and good dentition. NECK:  Supple, symmetrical, trachea midline, no adenopathy, thyroid symmetric, not enlarged and no tenderness, skin normal  LUNGS:  CTA  CARDIOVASCULAR:  2+ radial pulses, extremities warm and well perfused  ABDOMEN: Nondistended, NTTP  CHEST:  Atraumatic   GENITAL/URINARY:  deferred  NEUROLOGIC:  Awake, alert, oriented to name, place and time. Cranial nerves II-XII are grossly intact. Motor is 5 out of 5 bilaterally.   Sensory is intact.  gait is normal.  MUSCULOSKELETAL:    Right upper extremity:  · Skin intact circumferentially  · +TTP about the entire forearm  · Positive Tinel's of the cubital tunnel  · Positive Durkan's compression test  · Positive Tinel's the carpal tunnel  · Positive shoulder impingement  · Negative Spurling's test  · Shoulder forward flexion 180 degrees  · Shoulder abduction 170 degrees  · Shoulder internal rotation to T12  · Shoulder external rotation 45 degrees  · Compartments soft and compressible  · +AIN/PIN/Ulnar nerve function intact grossly  · +Radial pulse, Brisk Cap refill, hand warm and perfused  · Sensation intact to touch in radial/ulnar/median nerve distributions to hand    Left upper extremity:  · Skin intact circumferentially  · Positive Tinel's at the carpal tunnel  · Positive Durkan's compression test  · Negative Tinel's at the cubital tunnel  · Compartments soft and compressible  · +AIN/PIN/Ulnar nerve function intact grossly  · +Radial pulse, Brisk Cap refill, hand warm and perfused  · Sensation intact to touch in radial/ulnar/median nerve distributions to hand    DATA:    CBC:   Lab Results   Component Value Date    WBC 7.8 03/06/2021    RBC 4.70 03/06/2021    HGB 13.1 03/06/2021    HCT 43.3 03/06/2021    MCV 92.1 03/06/2021    MCH 27.9 03/06/2021    MCHC 30.3 03/06/2021    RDW 14.9 03/06/2021     03/06/2021    MPV 10.8 03/06/2021     PT/INR:    Lab Results   Component Value Date    PROTIME 11.8 05/15/2015    PROTIME 11.1 05/18/2012    INR 1.1 05/15/2015       Radiology Review:   Xray: x-rays of the right wrist were obtained today in the office and reviewed with the patient. 4 views: AP/oblique/lateral/carpal tunnel view: demonstrate no acute osseous abnormalities. No fractures or dislocations noted. Impression: No acute osseous abnormalities. Xray: x-rays of the left wrist were obtained today in the office and reviewed with the patient. 4 views: AP/oblique/lateral/carpal tunnel view: demonstrate no acute osseous abnormalities. No fractures or dislocations noted. Impression: No acute osseous abnormalities. Xray: x-rays of the right shoulder were obtained today in the office and reviewed with the patient. 3 views: AP/scapular Y/axillary view: demonstrate no acute osseous abnormalities. No fractures or dislocations noted. Impression: Acute osseous abnormalities.     Xray: x-rays of the right elbow obtained 4/6/2022 were reviewed today in the office and reviewed with the patient. 3 views: AP/lateral/oblique view: demonstrate chronic small osseous avulsion off the medial coronoid. Elbow joint is congruous with no fractures or dislocations noted. Impression: No acute osseous abnormalities    IMPRESSION:  · Right shoulder impingement  · Bilateral carpal tunnel syndrome right worse than left  · Right cubital tunnel syndrome  · Diabetes    PLAN:  Discussed physical exam, radiographic, and EMG findings with the patient today. She has borderline carpal tunnel syndrome on the right upper extremity that does not seem to be bothering her clinically at this time. She also has clinical evidence of cubital tunnel syndrome. Her main issue seems to be stemming from her shoulder pain. She would benefit from corticosteroid injection and was offered 1 today. Patient consented and this was well-tolerated with no complications. She was instructed to carefully watch her blood sugars for the next week or so as corticosteroid injections known to cause a spike in blood sugars in diabetic patients. She will begin therapy working on right shoulder range of motion and strengthening and therapeutic modalities as indicated for the next 6 weeks. She is to return in 6 weeks for repeat evaluation. Should patient's symptoms not be improved at that visit, MRI of the right shoulder may be considered. All questions were sought and answered at today's visit. I have seen and evaluated the patient and agree with the above assessment and plan on today's visit. I have performed the key components of the history and physical examination with significant findings of right shoulder pain status post fall. She does have some ulnar neuritis at the elbow. EMG nerve conduction study was reviewed and consistent with carpal tunnel syndrome consistent with exam findings. She also has exam findings positive for left carpal tunnel syndrome.   Most of her symptoms appear to be related to her upper extremity and shoulder region on the right. Given her provocative maneuvers offered and accepted a cortisone injection to the right shoulder. In addition she was referred over to therapy. May follow-up after completion of therapy if needed. . I concur with the findings and plan as documented. Alexsander Garcia MD  6/6/2022      Procedure Note Cortisone Injection to Shoulder    The right shoulder was identified as the injection site. The risk and benefits of a cortisone injection were explained and the patient consented to the injection. Under sterile conditions, the shoulder SAS was injected with a mixture of 80mg of Kenelog and 4 mL of 5% Ropivacaine and 4 mL of 1% Lidocaine without complication. A sterile bandage was applied.

## 2022-06-06 NOTE — PATIENT INSTRUCTIONS
Patient Education        Shoulder Bursitis: Exercises  Introduction  Here are some examples of exercises for you to try. The exercises may be suggested for a condition or for rehabilitation. Start each exercise slowly. Ease off the exercises if you start to have pain. You will be told when to start these exercises and which ones will work bestfor you. How to do the exercises  Posterior stretching exercise    1. Hold the elbow of your injured arm with your other hand. 2. Use your hand to pull your injured arm gently up and across your body. You will feel a gentle stretch across the back of your injured shoulder. 3. Hold for at least 15 to 30 seconds. Then slowly lower your arm. 4. Repeat 2 to 4 times. Up-the-back stretch    Your doctor or physical therapist may want you to wait to do this stretch until you have regained most of your range of motion and strength. You can do this stretch in different ways. Hold any of these stretches for at least 15 to 30seconds. Repeat them 2 to 4 times. 1. Light stretch: Put your hand in your back pocket. Let it rest there to stretch your shoulder. 2. Moderate stretch: With your other hand, hold your injured arm (palm outward) behind your back by the wrist. Pull your arm up gently to stretch your shoulder. 3. Advanced stretch: Put a towel over your other shoulder. Put the hand of your injured arm behind your back. Now hold the back end of the towel. With the other hand, hold the front end of the towel in front of your body. Pull gently on the front end of the towel. This will bring your hand farther up your back to stretch your shoulder. Overhead stretch    1. Standing about an arm's length away, grasp onto a solid surface. You could use a countertop, a doorknob, or the back of a sturdy chair. 2. With your knees slightly bent, bend forward with your arms straight. Lower your upper body, and let your shoulders stretch.   3. As your shoulders are able to stretch farther, you may need to take a step or two backward. 4. Hold for at least 15 to 30 seconds. Then stand up and relax. If you had stepped back during your stretch, step forward so you can keep your hands on the solid surface. 5. Repeat 2 to 4 times. Shoulder flexion (lying down)    To make a wand for this exercise, use a piece of PVC pipe or a broom handlewith the broom removed. Make the wand about a foot wider than your shoulders. 1. Lie on your back, holding a wand with both hands. Your palms should face down as you hold the wand. 2. Keeping your elbows straight, slowly raise your arms over your head. Raise them until you feel a stretch in your shoulders, upper back, and chest.  3. Hold for 15 to 30 seconds. 4. Repeat 2 to 4 times. Shoulder rotation (lying down)    To make a wand for this exercise, use a piece of PVC pipe or a broom handlewith the broom removed. Make the wand about a foot wider than your shoulders. 1. Lie on your back. Hold a wand with both hands with your elbows bent and palms up. 2. Keep your elbows close to your body, and move the wand across your body toward the sore arm. 3. Hold for 8 to 12 seconds. 4. Repeat 2 to 4 times. Shoulder blade squeeze    1. Stand with your arms at your sides, and squeeze your shoulder blades together. Do not raise your shoulders up as you squeeze. 2. Hold 6 seconds. 3. Repeat 8 to 12 times. Shoulder flexor and extensor exercise    These are isometric exercises. That means you contract your muscles withoutactually moving. 1. Push forward (flex): Stand facing a wall or doorjamb, about 6 inches or less back. Hold your injured arm against your body. Make a closed fist with your thumb on top. Then gently push your hand forward into the wall with about 25% to 50% of your strength. Don't let your body move backward as you push. Hold for about 6 seconds. Relax for a few seconds. Repeat 8 to 12 times. 2. Push backward (extend): Stand with your back flat against a wall. Your upper arm should be against the wall, with your elbow bent 90 degrees (your hand straight ahead). Push your elbow gently back against the wall with about 25% to 50% of your strength. Don't let your body move forward as you push. Hold for about 6 seconds. Relax for a few seconds. Repeat 8 to 12 times. Scapular exercise: Wall push-ups    This exercise is best done with your fingers somewhat turned out, rather thanstraight up and down. 1. Stand facing a wall, about 12 inches to 18 inches away. 2. Place your hands on the wall at shoulder height. 3. Slowly bend your elbows and bring your face to the wall. Keep your back and hips straight. 4. Push back to where you started. 5. Repeat 8 to 12 times. 6. When you can do this exercise against a wall comfortably, you can try it against a counter. You can then slowly progress to the end of a couch, then to a sturdy chair, and finally to the floor. Scapular exercise: Retraction    For this exercise, you will need elastic exercise material, such as surgicaltubing or Thera-Band. 1. Put the band around a solid object at about waist level. (A bedpost will work well.) Each hand should hold an end of the band. 2. With your elbows at your sides and bent to 90 degrees, pull the band back. Your shoulder blades should move toward each other. Then move your arms back where you started. 3. Repeat 8 to 12 times. 4. If you have good range of motion in your shoulders, try this exercise with your arms lifted out to the sides. Keep your elbows at a 90-degree angle. Raise the elastic band up to about shoulder level. Pull the band back to move your shoulder blades toward each other. Then move your arms back where you started. Internal rotator strengthening exercise    1. Start by tying a piece of elastic exercise material to a doorknob. You can use surgical tubing or Thera-Band. 2. Stand or sit with your shoulder relaxed and your elbow bent 90 degrees.  Your upper arm should rest comfortably against your side. Squeeze a rolled towel between your elbow and your body for comfort. This will help keep your arm at your side. 3. Hold one end of the elastic band in the hand of the painful arm. 4. Slowly rotate your forearm toward your body until it touches your belly. Slowly move it back to where you started. 5. Keep your elbow and upper arm firmly tucked against the towel roll or at your side. 6. Repeat 8 to 12 times. External rotator strengthening exercise    1. Start by tying a piece of elastic exercise material to a doorknob. You can use surgical tubing or Thera-Band. (You may also hold one end of the band in each hand.)  2. Stand or sit with your shoulder relaxed and your elbow bent 90 degrees. Your upper arm should rest comfortably against your side. Squeeze a rolled towel between your elbow and your body for comfort. This will help keep your arm at your side. 3. Hold one end of the elastic band with the hand of the painful arm. 4. Start with your forearm across your belly. Slowly rotate the forearm out away from your body. Keep your elbow and upper arm tucked against the towel roll or the side of your body until you begin to feel tightness in your shoulder. Slowly move your arm back to where you started. 5. Repeat 8 to 12 times. Follow-up care is a key part of your treatment and safety. Be sure to make and go to all appointments, and call your doctor if you are having problems. It's also a good idea to know your test results and keep alist of the medicines you take. Where can you learn more? Go to https://WO FundingblancaBright.md.[employer]. org and sign in to your [employer] account. Enter I106 in the [employer] box to learn more about \"Shoulder Bursitis: Exercises. \"     If you do not have an account, please click on the \"Sign Up Now\" link. Current as of: July 1, 2021               Content Version: 13.2  © 2920-8362 Healthwise, Incorporated.    Care instructions adapted under license by [facility] ([facility]). If you have questions about a medical condition or this instruction, always ask your healthcare professional. [place] [address] any warranty or liability for your use of this information.

## 2022-06-16 NOTE — PROGRESS NOTES
[address] [facility]   Phone: [phone]   Fax: [phone]           Date:  2022   Patient: Kwabena Paz  : 1965  MRN: [id number]  Referring Provider: MD Mabel Galeas [address] [place] [address]     Medical Diagnosis:   W02.955A (ICD-10-CM) - Strain of right shoulder, subsequent encounter      SUBJECTIVE:     Onset date: last few months     Mechanism of Injury / History: Pt reports 2 years ago fell down steps, reports thinks she landed on R side. Reports she broke her elbow at that time. Reports had shoulder xrayed at that time and was told it wasn't broken. Pt reports now has shoulder pain last few months. Has seen chiropractor. Reports has helped with some nerve pain. Pt reports now arm just aches. Reports difficulty with activities like washing dishes, making bed and using the sweeper. Patient is right handed. Previous PT: none    Medical Management for Current Problem: cortisone injections - reports was moderately helpful ; has another scheduled in September     Chief complaint: pain    Behavior: condition is staying the same    Pain:  Current: 6/10     Best: 0/10     Worst:8/10    Aggravated by:  shoulder abduction with elbow bent , rolling onto R side     Relieved by:  Norco at night     Symptom Type/Quality: aching  Location[de-identified] superior shoulder and upper arm         Imaging results: XR SHOULDER RIGHT (MIN 2 VIEWS)    Result Date: 2022  Radiology exam is complete. No Radiologist dictation. Please follow up with ordering provider. XR WRIST LEFT (MIN 3 VIEWS)    Result Date: 2022  Radiology exam is complete. No Radiologist dictation. Please follow up with ordering provider. XR WRIST RIGHT (MIN 3 VIEWS)    Result Date: 2022  Radiology exam is complete. No Radiologist dictation. Please follow up with ordering provider.        Past Medical History:  Past Medical History:   Diagnosis Date    Diabetes mellitus ([facility] [address]     GERD (gastroesophageal reflux disease)     Hypertension     IBS (irritable bowel syndrome)      Past Surgical History:   Procedure Laterality Date    CHOLECYSTECTOMY  11/11/2011    FOOT SURGERY      FOOT SURGERY  03/2019    plantar faschia    HEEL SPUR SURGERY  12/2016    JOINT REPLACEMENT      toe joints replacement 3/2017 and 9/2017    JOINT REPLACEMENT      both big toes    PARTIAL HYSTERECTOMY         Medications:   Current Outpatient Medications   Medication Sig Dispense Refill    HYDROcodone-acetaminophen (NORCO) 5-325 MG per tablet TAKE ONE TABLET BY MOUTH EVERY 6 HOURS      sucralfate (CARAFATE) 1 GM/10ML suspension Take 10 mLs by mouth 4 times daily (Patient not taking: Reported on 4/27/2021) 1200 mL 0    meloxicam (MOBIC) 7.5 MG tablet   0    metFORMIN (GLUCOPHAGE) 500 MG tablet Take 500 mg by mouth 2 times daily (with meals)      atenolol (TENORMIN) 25 MG tablet Take 25 mg by mouth daily      pantoprazole (PROTONIX) 40 MG tablet take 1 tablet by mouth once daily  0    PARoxetine (PAXIL) 10 MG tablet take 1 tablet by mouth once daily  0    Cyanocobalamin (VITAMIN B 12 PO) Take by mouth daily       methscopolamine (PAMINE FORTE) 2.5 MG TABS Take 2.5 mg by mouth daily. (Patient not taking: Reported on 5/2/2022)      acetaminophen (TYLENOL) 325 MG tablet Take 2 tablets by mouth every 4 hours as needed for Pain (For mild pain level 1-3 or for fever > 100.5). (Patient not taking: Reported on 4/6/2022)       No current facility-administered medications for this visit. Occupation: does not work.      Exercise regimen: none    Hobbies: reading on a tablet , play on computer     Patient Goals: pain relief    Precautions/Contraindications: none    OBJECTIVE:     Observations: well nourished female                   Palpation: Tender to palpation superior and anterior shoulder and R upper trap , Non-tender to palpation posterior shoulder      Joint/Motion:  Right Shoulder:  AROM: 170° Forward elevation,  90° ER,  IR to 50, abd 160      Left Shoulder:  AROM: 150° Forward elevation,  85° ER,  IR to 75, abd 160*      Strength:  Right Shoulder: Flexion 4-/5,  Abduction 4-/5, ER 5-/5, IR 5- /5      Left Shoulder: Flexion 5/5,  Abduction 5/5, ER 5/5, IR 5/5       Special Tests/Functional Screens:    [] Kitty [x]+ / [] -  [] [place]'s []+ / [x] -   [] AC Sheer []+ / [] -    [] GH drawer []+ / [] -    [] Bicep Load []+ / [] -   [] Crank []+ / [] -  [] Sarah Gandhi []+ / [] -   [] Jose Cruz Vasquez []+ / [] -  [] Newolfgnag's []+ / [] -      [] Speed's [x]+ / [] -   [] David's []+ / [x] -    [] Sulcus Sign []+ / [] -   [] Apprehension []+ / [] -   [] Bicep Load II []+ / [] -   [] Elbow Valgus []+ / [] -     [] Elbow Varus []+ / [] -   [] Brandon's relocation []+ / [] -   [] Empty Can []+ / [x] -  [] Drop arm []+ / [] -  [] ER lag []+ / [] -  [] Painful Arc []+ / [] -  [] Ulices Dear []+ / [] -  [] Belly Press/ lift off[]+ / [x] -  [] Other:Spurling's  []+ / [x] -         ASSESSMENT     Outcome Measure:   QuickDASH (Disorders of the Arm, Shoulder, and Hand) 59.09% disability    Problems:    Pain reported 0-8/10   ROM decreased   Strength decreased   Decreased functional ability with ADLs , use of right upper extremity, reaching, lifting, carrying    Reason for Skilled Care: Pt presents to therapy due to pain affecting daily activities. Pt will benefit from skilled PT to improve ROM, strength and mobility for return to daily activities. [x] There are no barriers affecting plan of care or recovery    [] Barriers to this patient's plan of care or recovery include. Domestic Concerns:  [x] No  [] Yes:    Short Term goals (3 weeks)   Decrease reported pain to 0-5/10   Increase ROM to AROM: 175° Forward elevation,  90° ER,  IR to 60, abd 170   Increase Strength to 4 to 4+/5     Able to perform/complete the following functions/tasks: pt able sleep with min pain/limitation.   Pt able to perform household chores 20+ minutes with min pain/limitation. Pt able to carry 5 pounds at waist height for 50 ft with min pain/limitation.  QuickDASH (Disorders of the Arm, Shoulder, and Hand) 45% disability    Long Term goals (6 weeks)   Decrease reported pain to 0-3/10   Increase ROM to AROM: 180° Forward elevation,  90° ER,  IR to 70, abd 180   Increase Strength to 5- to 5/5     Able to perform/complete the following functions/tasks: pt able to sleep through the night with no pain/limitation. Pt able to perform household chores 40+ minutes with no pain/limitation. Pt able to carry 8 + pounds at waist height for 100 ft with no pain/limitation.  QuickDASH 40% disability   Independent with Home Exercise Programs    Rehab Potential: [x] Good  [] Fair  [] Poor    PLAN       Treatment Plan:   [x] Therapeutic Exercise  [x] Therapeutic Activity  [x] Neuromuscular Re-education   [] Gait Training  [] Balance Training  [x] Aerobic conditioning  [x] Manual Therapy  [x] Massage/Fascial release   [] Work/Sport specific activities    [] Pain Neuroscience [x] Cold/hotpack  [] Vasocompression  [x] Electrical Stimulation  [] Lumbar/Cervical Traction  [x] Ultrasound   [] Iontophoresis: 4 mg/mL Dexamethasone Sodium Phosphate 40-80 mAmin        [x] Instruction in HEP      []  Medication allergies reviewed for use of Dexamethasone Sodium Phosphate 4mg/ml  with iontophoresis treatments. Patient is not allergic.       The following CPT codes are likely to be used in the care of this patient: [address] [facility] PT Evaluation: Low Complexity   05509 PT Re-Evaluation   [facility] Neuromuscular Re-Education   92414 Therapeutic Activities   11164 Manual Therapy    Electrical Stimulation  16613 US      Suggested Professional Referral: [x] No  [] Yes:     Patient Education:  [x] Plans/Goals, Risks/Benefits discussed  [x] Home exercise program  Method of Education: [x] Verbal  [x] Demo  [x] Written  Comprehension of Education:  [x] Annelise

## 2022-06-17 ENCOUNTER — EVALUATION (OUTPATIENT)
Dept: PHYSICAL THERAPY | Age: 57
End: 2022-06-17
Payer: COMMERCIAL

## 2022-06-17 DIAGNOSIS — S46.911D STRAIN OF RIGHT SHOULDER, SUBSEQUENT ENCOUNTER: Primary | ICD-10-CM

## 2022-06-17 PROCEDURE — 97161 PT EVAL LOW COMPLEX 20 MIN: CPT | Performed by: PHYSICAL THERAPIST

## 2022-06-17 NOTE — PROGRESS NOTES
[facility]   Phone: [phone]   Fax: [phone]      Physical Therapy Daily Treatment Note    Date: 2022  Patient Name: Sloane Bundy  : 1965   MRN: [id number]  [facility]  DOSx: NA   Referring Provider: MD Mabel Ocampo [address] [place] [address]     Medical Diagnosis: E34.173B (ICD-10-CM) - Strain of right shoulder, subsequent encounter       Outcome Measure:  Saint Rear 59.09%    S: See eval.   O: Pt given written HEP  Time 9530-       Visit 1 Repeat outcome measure at mid point and end. Pain 0-8/10     ROM Joint/Motion:  Right Shoulder:  AROM: 170° Forward elevation,  90° ER,  IR to 50, abd 160        Left Shoulder:  AROM: 150° Forward elevation,  85° ER,  IR to 75, abd 160*     Modalities            Manual                  Stretch      Table slides      Wall Flexion      Wall ER stretch      Towel IR stretch      IR reaching behind back      Exercise      Shrugs AROM      Pendulum Ex      UBE      Pulleys - flex      Pulleys-IR      Supine wand chest press      Supine wand flex      Supine wand ER/IR      Supine flexion      S-lying ABD      S-lying ER      Standing wand flex      Standing flexion      Standing ABD            ROWS: H Functional activities  To aid in ROM and strength needed for reaching , lifting ,pushing and pulling at home/work    ROWS: M  \"    ROWS: L  \"    ER  \"    IR  \"                A:  Tolerated well.    P: Continue with rehab plan  Christopher aPulino, PT DPT, PT [id number]    Treatment Charges: Mins Units   Initial Evaluation 34 1   Re-Evaluation     Ther Exercise         TE     Manual Therapy     MT     Ther Activities        TA     Gait Training          GT     Neuro Re-education NR     Modalities     Non-Billable Service Time     Other     Total Time/Units 34 1

## 2022-06-21 ENCOUNTER — TREATMENT (OUTPATIENT)
Dept: PHYSICAL THERAPY | Age: 57
End: 2022-06-21
Payer: COMMERCIAL

## 2022-06-21 DIAGNOSIS — S46.911D STRAIN OF RIGHT SHOULDER, SUBSEQUENT ENCOUNTER: Primary | ICD-10-CM

## 2022-06-21 PROCEDURE — 97110 THERAPEUTIC EXERCISES: CPT

## 2022-06-21 NOTE — PROGRESS NOTES
[address] [facility]   Phone: [phone]   Fax: [phone]      Physical Therapy Daily Treatment Note    Date: 2022  Patient Name: Richar Cota  : 1965   MRN: [id number]  DOInjury: 1 years  DOSx: NA   Referring Provider: MD Mabel Little [address] [place] [address]     Medical Diagnosis: Y26.520C (ICD-10-CM) - Strain of right shoulder, subsequent encounter       Outcome Measure:  Anusha Pinoic 59.09%    S: Pt reports pain in R shoulder is \"sore\" this morning. No rating given. O:   Time 7391-  6009     Visit 2 Repeat outcome measure at mid point and end. Pain See above     ROM Joint/Motion:  Right Shoulder:  AROM: 170° Forward elevation,  90° ER,  IR to 50, abd 160        Left Shoulder:  AROM: 150° Forward elevation,  85° ER,  IR to 75, abd 160*     Modalities      ICE 10 min     Manual                  Stretch      Table slides flex and scap  10 x 10s hold  HEP    Wall Flexion towel  10 x 10s     Wall ER stretch      Towel IR stretch      IR reaching behind back      Exercise      Shrugs AROM      Pendulum Ex      UBE      Pulleys - flex 20 x 5s hold     Pulleys-IR      Supine wand chest press 2 x 10     Supine wand flex 2 x 10 To apporox 100*    Supine wand ER/IR      Supine flexion      S-lying ABD 2 x 10     S-lying ER 2 x 10 To appox 120*    Supine serratus punch  2 x 10      Standing wand flex      Standing flexion      Standing ABD            ROWS: H Functional activities  To aid in ROM and strength needed for reaching , lifting ,pushing and pulling at home/work    ROWS: M  \"    ROWS: L  \"    ER  \"    IR  \"                A:  Tolerated well. No increase in pain following treatment. Ice applied at end of session.    P: Continue with rehab plan  Reese Gunn, PTA [id number]    Treatment Charges: Mins Units   Initial Evaluation     Re-Evaluation     Ther Exercise         TE 30 2   Manual Therapy     MT     Ther Activities        TA     Gait Training GT     Neuro Re-education NR     Modalities     Non-Billable Service Time 10    Other     Total Time/Units 40 2

## 2022-06-23 ENCOUNTER — TREATMENT (OUTPATIENT)
Dept: PHYSICAL THERAPY | Age: 57
End: 2022-06-23
Payer: COMMERCIAL

## 2022-06-23 DIAGNOSIS — S46.911D STRAIN OF RIGHT SHOULDER, SUBSEQUENT ENCOUNTER: Primary | ICD-10-CM

## 2022-06-23 PROCEDURE — 97530 THERAPEUTIC ACTIVITIES: CPT

## 2022-06-23 PROCEDURE — 97110 THERAPEUTIC EXERCISES: CPT

## 2022-06-23 NOTE — PROGRESS NOTES
[facility]   Phone: [phone]   Fax: [phone]      Physical Therapy Daily Treatment Note    Date: 2022  Patient Name: David Noble  : 1965   MRN: [id number]  DOInjury: 1 years  DOSx: NA   Referring Provider: MD Mabel Panchal [address] [place] [address]     Medical Diagnosis: Y67.788J (ICD-10-CM) - Strain of right shoulder, subsequent encounter       Outcome Measure:  Berenice Louie 59.09%    S: Pt reports pain in R shoulder pain is 1/10 today. O:   Time 649- 3502     Visit 3 Repeat outcome measure at mid point and end. Pain See above     ROM Joint/Motion:  Right Shoulder:  AROM: 170° Forward elevation,  90° ER,  IR to 50, abd 160        Left Shoulder:  AROM: 150° Forward elevation,  85° ER,  IR to 75, abd 160*     Modalities      ICE      Manual                  Stretch      Table slides flex and scap  10 x 10s hold  HEP    Wall Flexion towel  10 x 10s     Wall ER stretch      Towel IR stretch 10 x 10s hold     IR reaching behind back      IR behind back with wand 10 x 10s hold     Exercise      Shrugs AROM      Pendulum Ex      UBE      Pulleys - flex 20 x 5s hold     Pulleys-IR      Supine wand chest press 2 x 10     Supine wand flex 2 x 10 To apporox 100*    Supine wand ER/IR      Supine flexion 2 x 10     S-lying ABD 2 x 10     S-lying ER 2 x 10 To appox 120*    Supine serratus punch  2 x 10      Standing wand flex      Standing flexion      Standing ABD      Bicep curls 3 ways  2 x 10 each  2#    ROWS: H Functional activities  To aid in ROM and strength needed for reaching , lifting ,pushing and pulling at home/work    ROWS: M 2 x 10 OTB    ROWS: L  \"    ER 2 x 10 OTB    IR 2 x 10 OTB                A:  Tolerated well. Pt reports muscle fatigue and \"soreness\" following treatment. No rating given.      P: Continue with rehab plan  Maninder Santos, PTA [id number]    Treatment Charges: Mins Units   Initial Evaluation     Re-Evaluation     Ther Exercise TE 30 2   Manual Therapy     MT     Ther Activities        TA 8 1   Gait Training          GT     Neuro Re-education NR     Modalities     Non-Billable Service Time     Other     Total Time/Units 38 3

## 2022-06-28 ENCOUNTER — TREATMENT (OUTPATIENT)
Dept: PHYSICAL THERAPY | Age: 57
End: 2022-06-28
Payer: COMMERCIAL

## 2022-06-28 DIAGNOSIS — S46.911D STRAIN OF RIGHT SHOULDER, SUBSEQUENT ENCOUNTER: Primary | ICD-10-CM

## 2022-06-28 PROCEDURE — 97110 THERAPEUTIC EXERCISES: CPT | Performed by: PHYSICAL THERAPIST

## 2022-06-28 NOTE — PROGRESS NOTES
[address] [facility]   Phone: [phone]   Fax: [phone]      Physical Therapy Daily Treatment Note    Date: 2022  Patient Name: Reina Sacks  : 1965   MRN: [id number]  DOInjury: 2 years  DOSx: NA   Referring Provider: [facility] [address] [place] [address]     Medical Diagnosis: C10.133C (ICD-10-CM) - Strain of right shoulder, subsequent encounter       Outcome Measure:  Jeff Mace 59.09%    S: Pt reports no pain today. Reports morning after last session had some pain in elbow that lasted for 3 days but feels ok today. Pt reports didn't have any pain in session. Pt reports not sure if she did something at home or due to any new exercises in session. O:   Time 1001- 1040     Visit 4 Repeat outcome measure at mid point and end. Pain See above     ROM Joint/Motion:  Right Shoulder:  AROM: 170° Forward elevation,  90° ER,  IR to 50, abd 160        Left Shoulder:  AROM: 150° Forward elevation,  85° ER,  IR to 75, abd 160*     Modalities      ICE 5 min     Manual                  Stretch      Table slides flex and scap   HEP    Wall Flexion towel  10 x 10s     Wall ER stretch      Towel IR stretch 10 x 10s hold     IR reaching behind back      IR behind back with wand      Exercise      Shrugs AROM      Pendulum Ex      UBE      Pulleys - flex 20 x 5s hold     Pulleys-IR      Supine wand chest press 2 x 10     Supine wand flex 2 x 10 To approx 100*    Supine wand ER/IR      Supine flexion 2 x 10     S-lying ABD 2 x 10     S-lying ER 2 x 10     Supine serratus punch  2 x 10      Standing wand flex      Standing flexion      Standing ABD      Bicep curls  2 x 10 each  2# eccentric, regular     ROWS: H Functional activities  To aid in ROM and strength needed for reaching , lifting ,pushing and pulling at home/work    ROWS: M OTB    ROWS: L \"    ER OTB    IR OTB                A:  Tolerated well.      P: Continue with rehab plan  Harry Salinas PT [id number]    Treatment Charges: Mins Units   Initial Evaluation     Re-Evaluation     Ther Exercise         TE 34 2   Manual Therapy     MT     Ther Activities        TA     Gait Training          GT     Neuro Re-education NR     Modalities     Non-Billable Service Time 5    Other     Total Time/Units 39 2

## 2022-06-30 ENCOUNTER — TREATMENT (OUTPATIENT)
Dept: PHYSICAL THERAPY | Age: 57
End: 2022-06-30
Payer: COMMERCIAL

## 2022-06-30 DIAGNOSIS — S46.911D STRAIN OF RIGHT SHOULDER, SUBSEQUENT ENCOUNTER: Primary | ICD-10-CM

## 2022-06-30 PROCEDURE — 97110 THERAPEUTIC EXERCISES: CPT | Performed by: PHYSICAL THERAPIST

## 2022-06-30 NOTE — PROGRESS NOTES
[address] [facility]   Phone: [phone]   Fax: [phone]      Physical Therapy Daily Treatment Note    Date: 2022  Patient Name: Valeri Kelley  : 1965   MRN: [id number]  DOInjury: 2 years  DOSx: NA   Referring Provider: No referring provider defined for this encounter. Medical Diagnosis: J62.710W (ICD-10-CM) - Strain of right shoulder, subsequent encounter       Outcome Measure:  Laura [place] 59.09%    S: Pt reports no pain today. Reports didn't have any pain after last session. O:   Time 2507- 9550     Visit 5 Repeat outcome measure at mid point and end. Pain See above     ROM Joint/Motion:  Right Shoulder:  AROM: 170° Forward elevation,  90° ER,  IR to 50, abd 160        Left Shoulder:  AROM: 150° Forward elevation,  85° ER,  IR to 75, abd 160*     Modalities      ICE 10  min     Manual                  Stretch      Table slides flex and scap   HEP    Wall Flexion towel  10 x 10s     Wall ER stretch      Towel IR stretch 10 x 10s hold     IR reaching behind back      IR behind back with wand      Exercise      Shrugs AROM      Pendulum Ex      UBE      Pulleys - flex 20 x 5s hold     Pulleys-IR      Supine wand chest press 2 x 10     Supine wand flex 2 x 10 To approx 100*    Supine wand ER/IR      Supine flexion 2 x 10     S-lying ABD 2 x 10     S-lying ER 2 x 10     Supine serratus punch  2 x 10      Standing wand flex      prone rows and y's  2 x 10            Standing flexion      Standing ABD      Bicep curls  2 x 10 each  2# eccentric, regular     ROWS: H Functional activities  To aid in ROM and strength needed for reaching , lifting ,pushing and pulling at home/work    ROWS: M 2 x 10OTB    ROWS: L \"    ER OTB    IR OTB                A:  Tolerated well. P: Continue with rehab plan.      [place], [place] [address]    Treatment Charges: Mins Units   Initial Evaluation     Re-Evaluation     Ther Exercise         TE 35 2   Manual Therapy     MT     Ther Activities        TA     Gait Training          GT     Neuro Re-education NR     Modalities     Non-Billable Service Time 10     Other     Total Time/Units 45 2

## 2022-07-07 ENCOUNTER — TREATMENT (OUTPATIENT)
Dept: PHYSICAL THERAPY | Age: 57
End: 2022-07-07
Payer: COMMERCIAL

## 2022-07-07 DIAGNOSIS — S46.911D STRAIN OF RIGHT SHOULDER, SUBSEQUENT ENCOUNTER: Primary | ICD-10-CM

## 2022-07-07 PROCEDURE — 97110 THERAPEUTIC EXERCISES: CPT | Performed by: PHYSICAL THERAPIST

## 2022-07-07 NOTE — PROGRESS NOTES
[address] [facility]   Phone: [phone]   Fax: [phone]      Physical Therapy Daily Treatment Note    Date: 2022  Patient Name: Jayleen Tamayo  : 1965   MRN: [id number]  DOInjury: 2 years  DOSx: NA   Referring Provider: No referring provider defined for this encounter. Medical Diagnosis: K22.319B (ICD-10-CM) - Strain of right shoulder, subsequent encounter       Outcome Measure:  Xavier [place] 59.09%    S: Pt reports no pain today but reports had pain sleeping last night and reports feels that cortisone shot wore off over the weekend. O:   Time 1000-  1040     Visit 6 Repeat outcome measure at mid point and end. Pain See above     ROM Joint/Motion:  Right Shoulder:  AROM: 170° Forward elevation,  90° ER,  IR to 50, abd 160        Left Shoulder:  AROM: 150° Forward elevation,  85° ER,  IR to 75, abd 160*     Modalities      ICE 10  min     Manual                  Stretch      Table slides flex and scap   HEP    Wall Flexion towel      Wall ER stretch     Towel IR stretch     IR reaching behind back      IR behind back with wand      Exercise      Shrugs AROM      Pendulum Ex      UBE      Pulleys - flex 20 x 5s hold     Pulleys-IR      Supine wand chest press 2 x 10     Supine wand flex 2 x 10     Supine wand ER/IR 2 x 10      Supine flexion 2 x 10     S-lying ABD 2 x 10     S-lying ER 2 x 10     Supine serratus punch  2 x 10      Standing wand flex      prone rows and y's             Standing flexion      Standing ABD      Bicep curls  2 x 10 each  2# eccentric, regular     ROWS: H Functional activities  To aid in ROM and strength needed for reaching , lifting ,pushing and pulling at home/work    ROWS: M OTB    ROWS: L \"    ER OTB    IR OTB                A:  Tolerated well. Pt reports in general not getting a shooting pain anymore but feels a deeper ache inside upper arm more than actual shoulder joint. Ice end of session. P: Continue with rehab plan. Jeancarlos Monte[place], [place] [address]    Treatment Charges: Mins Units   Initial Evaluation     Re-Evaluation     Ther Exercise         TE 30 2   Manual Therapy     MT     Ther Activities        TA     Gait Training          GT     Neuro Re-education NR     Modalities     Non-Billable Service Time 10     Other     Total Time/Units 40 2

## 2022-07-12 ENCOUNTER — TREATMENT (OUTPATIENT)
Dept: PHYSICAL THERAPY | Age: 57
End: 2022-07-12
Payer: COMMERCIAL

## 2022-07-12 DIAGNOSIS — S46.911D STRAIN OF RIGHT SHOULDER, SUBSEQUENT ENCOUNTER: Primary | ICD-10-CM

## 2022-07-12 PROCEDURE — 97110 THERAPEUTIC EXERCISES: CPT | Performed by: PHYSICAL THERAPIST

## 2022-07-12 PROCEDURE — 97530 THERAPEUTIC ACTIVITIES: CPT | Performed by: PHYSICAL THERAPIST

## 2022-07-12 NOTE — PROGRESS NOTES
[address] [facility]   Phone: [phone]   Fax: [phone]      Physical Therapy Daily Treatment Note    Date: 2022  Patient Name: Abhinav Ford  : 1965   MRN: [id number]  DOInjury: 2 years  DOSx: NA   Referring Provider: MD Mabel Meza [address] [place] [address]     Medical Diagnosis: V60.047X (ICD-10-CM) - Strain of right shoulder, subsequent encounter       Outcome Measure:  Joyce Dust 59.09%    S: Pt reports no pain at the moment walking in today. Reports felt ok after last treatment session. O:   Time 1000- 1038      Visit 7  Repeat outcome measure at mid point and end. Pain See above     ROM Joint/Motion:  Right Shoulder:  AROM: 170° Forward elevation,  90° ER,  IR to 50, abd 160        Left Shoulder:  AROM: 150° Forward elevation,  85° ER,  IR to 75, abd 160*     Modalities      ICE      Manual                  Stretch      Table slides flex and scap   HEP    Wall Flexion towel      Wall ER stretch     Towel IR stretch     IR reaching behind back      IR behind back with wand      Exercise      Shrugs AROM      Pendulum Ex      UBE      Pulleys - flex 20 x 5s hold     Pulleys-IR      Supine wand chest press 2 x 10     Supine wand flex 2 x 10     Supine wand ER/IR      Supine flexion 2 x 10     S-lying ABD 2 x 10     S-lying ER 2 x 10 1#    Supine serratus punch  2 x 10      Standing wand flex      prone rows and y's  2 x 10      scap retraction 2 x 10  hep    Standing flexion      Standing ABD      Bicep curls   2# eccentric, regular     ROWS: H Functional activities  To aid in ROM and strength needed for reaching , lifting ,pushing and pulling at home/work    ROWS: M 2 x 10 RTB; HEP ta   ROWS: L  \"    ER 2 x 10 RTB: HEP  ta   IR 2 x 10 RTB: HEP ta               A:  Tolerated well. Pt given handout and band to add scap retraction and band exercises as above to HEP. Pt demonstrates understanding. P: Continue with rehab plan.      Júnior Driscoll DEAN, [address] [facility]    Treatment Charges: Mins Units   Initial Evaluation     Re-Evaluation     Ther Exercise         TE 28 2   Manual Therapy     MT     Ther Activities        TA 10 1   Gait Training          GT     Neuro Re-education NR     Modalities     Non-Billable Service Time      Other     Total Time/Units 38  3

## 2022-07-19 ENCOUNTER — TREATMENT (OUTPATIENT)
Dept: PHYSICAL THERAPY | Age: 57
End: 2022-07-19

## 2022-07-19 DIAGNOSIS — S46.911D STRAIN OF RIGHT SHOULDER, SUBSEQUENT ENCOUNTER: Primary | ICD-10-CM

## 2022-07-19 NOTE — PROGRESS NOTES
[address] [facility]   Phone: [phone]   Fax: [phone]      Physical Therapy Daily Treatment Note    Date: 2022  Patient Name: Sourav Jovel  : 1965   MRN: [id number]  DOInjury: 2 years  DOSx: NA   Referring Provider: No referring provider defined for this encounter. Medical Diagnosis: U24.492E (ICD-10-CM) - Strain of right shoulder, subsequent encounter       Outcome Measure:  Gi  59.09%    S: Pt reports pain yesterday and today 6/10. O:   Time 698- 8573      Visit 7  Repeat outcome measure at mid point and end. Pain See above     ROM Joint/Motion:  Right Shoulder:  AROM: 170° Forward elevation,  90° ER,  IR to 50, abd 160        Left Shoulder:  AROM: 150° Forward elevation,  85° ER,  IR to 75, abd 160*     Modalities      ICE     Manual                  Stretch      Table slides flex and scap  HEP    Wall Flexion towel      Wall ER stretch     Towel IR stretch     IR reaching behind back      IR behind back with wand     Exercise      Shrugs AROM      Pendulum Ex      UBE      Pulleys - flex 20 x 5s hold     Pulleys-IR      Supine wand chest press 2 x 10     Supine wand flex 2 x 10    Supine wand ER/IR     Supine flexion 2 x 10     S-lying ABD 2 x 10     S-lying ER 2 x 10 1#    Supine serratus punch  2 x 10      Standing wand flex      prone rows and y's      scap retraction 2 x 10  hep    Standing flexion      Standing ABD      Bicep curls 2 x 10 each  2# eccentric, regular     ROWS: H Functional activities  To aid in ROM and strength needed for reaching , lifting ,pushing and pulling at home/work    ROWS: M 2 x 10 RTB; HEP ta   ROWS: L  \"    ER 2 x 10 RTB: HEP  ta   IR 2 x 10 RTB: HEP ta               A:  Tolerated well. Reports pain is a little bit better following treatment. P: Continue with rehab plan.      Jyoti Toure, PTA [address]    Treatment Charges: Mins Units   Initial Evaluation     Re-Evaluation     Ther Exercise         TE 29 2 Manual Therapy     MT     Ther Activities        TA 10 1   Gait Training          GT     Neuro Re-education NR     Modalities     Non-Billable Service Time      Other     Total Time/Units 39 3

## 2022-07-21 ENCOUNTER — TREATMENT (OUTPATIENT)
Dept: PHYSICAL THERAPY | Age: 57
End: 2022-07-21
Payer: COMMERCIAL

## 2022-07-21 DIAGNOSIS — S46.911D STRAIN OF RIGHT SHOULDER, SUBSEQUENT ENCOUNTER: Primary | ICD-10-CM

## 2022-07-21 PROCEDURE — 97530 THERAPEUTIC ACTIVITIES: CPT

## 2022-07-21 PROCEDURE — 97110 THERAPEUTIC EXERCISES: CPT

## 2022-07-21 NOTE — PROGRESS NOTES
[address] [facility]   Phone: [phone]   Fax: [phone]      Physical Therapy Daily Treatment Note    Date: 2022  Patient Name: Viktoria Talbert  : 1965   MRN: [id number]  DOInjury: 2 years  DOSx: NA   Referring Provider:      Medical Diagnosis: G83.559V (ICD-10-CM) - Strain of right shoulder, subsequent encounter       Outcome Measure:  Bracken Gaseamusomer 59.09%    S: Pt reports pain yesterday and today 6/10. O:   Time 331- 3517      Visit 8  Repeat outcome measure at mid point and end. Pain See above     ROM Joint/Motion:  Right Shoulder:  AROM: 170° Forward elevation,  90° ER,  IR to 50, abd 160        Left Shoulder:  AROM: 150° Forward elevation,  85° ER,  IR to 75, abd 160*     Modalities      ICE     Manual                  Stretch      Table slides flex and scap  HEP    Wall Flexion towel      Wall ER stretch     Towel IR stretch     IR reaching behind back      IR behind back with wand     Exercise      Shrugs AROM      Pendulum Ex      UBE      Pulleys - flex 20 x 5s hold     Pulleys-IR      Supine wand chest press 2 x 10     Supine wand flex 2 x 10    Supine wand ER/IR     Supine flexion 2 x 10 1#    S-lying ABD 2 x 10 1#    S-lying ER 2 x 10 1#    Supine serratus punch  2 x 10  1#    Standing wand flex           prone rows and y's      scap retraction 2 x 10  hep    Standing flexion      Standing ABD      Bicep curls 2 x 10 each  2# eccentric, regular     ROWS: H Functional activities  To aid in ROM and strength needed for reaching , lifting ,pushing and pulling at home/work    ROWS: M 2 x 10 RTB; HEP ta   ROWS: L  \"    ER 2 x 10 RTB: HEP  ta   IR 2 x 10 RTB: HEP ta               A:  Tolerated well. Pt reports fatigue with no pain. P: Continue with rehab plan.      [place], PTA [address]    Treatment Charges: Mins Units   Initial Evaluation     Re-Evaluation     Ther Exercise         TE 29 2   Manual Therapy     MT     Ther Activities        TA 10 1 Gait Training          GT     Neuro Re-education NR     Modalities     Non-Billable Service Time      Other     Total Time/Units 39 3

## 2022-07-26 ENCOUNTER — TREATMENT (OUTPATIENT)
Dept: PHYSICAL THERAPY | Age: 57
End: 2022-07-26
Payer: COMMERCIAL

## 2022-07-26 DIAGNOSIS — S46.911D STRAIN OF RIGHT SHOULDER, SUBSEQUENT ENCOUNTER: Primary | ICD-10-CM

## 2022-07-26 PROCEDURE — 97110 THERAPEUTIC EXERCISES: CPT | Performed by: PHYSICAL THERAPIST

## 2022-07-26 NOTE — PROGRESS NOTES
[address] [facility] and [facility]   Phone: [phone]   Fax: [phone]      Physical Therapy Daily Treatment Note    Date: 2022  Patient Name: Cheryl Fernández  : 1965   MRN: [id number]  DOInjury: 2 years  DOSx: NA   Referring Provider:      Medical Diagnosis: W12.151N (ICD-10-CM) - Strain of right shoulder, subsequent encounter       Outcome Measure:  Vonda Encinas 59.09%    S: Pt reports no pain today but is achy due to lots of activity over the weekend (lifting and moving items for bridal shower). Pt c/o sharp elbow pain while stirring a pot that subsided after stopping the motion. Pt has Hx of past elbow fracture. O:   Time 1000- 1042      Visit 9 Repeat outcome measure at mid point and end. Pain See above     ROM Joint/Motion:  Right Shoulder:  AROM: 170° Forward elevation,  90° ER,  IR to 50, abd 160        Left Shoulder:  AROM: 150° Forward elevation,  85° ER,  IR to 75, abd 160*     Modalities      ICE     Manual                  Stretch      Table slides flex and scap  HEP    Wall Flexion towel      Wall ER stretch     Towel IR stretch     IR reaching behind back      Sleeper stretch  2 x 30 s      IR behind back with wand     Exercise      Shrugs AROM      Pendulum Ex      UBE      Pulleys - flex 20 x 5s hold     Pulleys-IR      Supine wand chest press 2 x 10     Supine wand flex 2 x 10    Supine wand ER/IR     Supine flexion 2 x 10 1#    S-lying ABD 2 x 10 1#    S-lying ER 2 x 10 1#    Supine serratus punch  2 x 10  1#    Standing wand flex           prone rows and y's      scap retraction 2 x 10  hep    Standing flexion      Standing ABD      Bicep curls 2 x 10 each  2# eccentric, regular     ROWS: H Functional activities  To aid in ROM and strength needed for reaching , lifting ,pushing and pulling at home/work    ROWS: M 2 x 10 RTB; HEP ta   ROWS: L  \"    ER 2 x 10 RTB: HEP  ta   IR 2 x 10 RTB: HEP ta               A:  Tolerated well.  Pt reports fatigue with no pain during side lying ABD and ER. Pt c/o of trouble with motions behind the back so sleeper stretch was added. Pt reported no pain and improved aching at end of treatment. P: Continue with rehab plan.      Zandra Officer, BAKARI   [place], [place] [address]    Treatment Charges: Mins Units   Initial Evaluation     Re-Evaluation     Ther Exercise         TE 38 3   Manual Therapy     MT     Ther Activities        TA 4    Gait Training          GT     Neuro Re-education NR     Modalities     Non-Billable Service Time      Other     Total Time/Units 42 3

## 2022-07-28 ENCOUNTER — TREATMENT (OUTPATIENT)
Dept: PHYSICAL THERAPY | Age: 57
End: 2022-07-28
Payer: COMMERCIAL

## 2022-07-28 DIAGNOSIS — S46.911D STRAIN OF RIGHT SHOULDER, SUBSEQUENT ENCOUNTER: Primary | ICD-10-CM

## 2022-07-28 PROCEDURE — 97110 THERAPEUTIC EXERCISES: CPT | Performed by: PHYSICAL THERAPIST

## 2022-07-28 PROCEDURE — 97530 THERAPEUTIC ACTIVITIES: CPT | Performed by: PHYSICAL THERAPIST

## 2022-07-28 NOTE — PROGRESS NOTES
pain. Pt was cued for correct technique of sleeper stretch. Pt denied pain after treatment. P: Continue with rehab plan.      Radha Suggs, SPT   [facility], [place] [address]    Treatment Charges: Mins Units   Initial Evaluation     Re-Evaluation     Ther Exercise         TE 31 2   Manual Therapy     MT     Ther Activities        TA 11 1   Gait Training          GT     Neuro Re-education NR     Modalities     Non-Billable Service Time      Other     Total Time/Units 42 3

## 2022-08-02 ENCOUNTER — TREATMENT (OUTPATIENT)
Dept: PHYSICAL THERAPY | Age: 57
End: 2022-08-02
Payer: COMMERCIAL

## 2022-08-02 DIAGNOSIS — S46.911D STRAIN OF RIGHT SHOULDER, SUBSEQUENT ENCOUNTER: Primary | ICD-10-CM

## 2022-08-02 PROCEDURE — 97164 PT RE-EVAL EST PLAN CARE: CPT | Performed by: PHYSICAL THERAPIST

## 2022-08-02 NOTE — PROGRESS NOTES
[facility] [facility]   Phone: [phone]   Fax: [phone]        Referring Provider: MD Mabel Overton [address] [place] [address]     Medical Diagnosis:   K53.945N (ICD-10-CM) - Strain of right shoulder, subsequent encounter       CERTIFICATION PERIOD:   6/17/2022  to 7/29/2022. ATTENDANCE:  Patient has attended 15 of 12 scheduled treatments from 6/17/2022   to 8/2/2022.   TREATMENTS RECEIVED:  therapeutic exercise, therapeutic activity     INITIAL STATUS:  Observations: well nourished female                                                       Palpation: Tender to palpation superior and anterior shoulder and R upper trap , Non-tender to palpation posterior shoulder       Joint/Motion:  Right Shoulder:  AROM: 170° Forward elevation,  90° ER,  IR to 50, abd 160        Left Shoulder:  AROM: 150° Forward elevation,  85° ER,  IR to 75, abd 160*        Strength:  Right Shoulder: Flexion 4-/5,  Abduction 4-/5, ER 5-/5, IR 5- /5       Left Shoulder: Flexion 5/5,  Abduction 5/5, ER 5/5, IR 5/5         Special Tests/Functional Screens:    [] Cristina-James [x]+ / [] -  [] [place]'s []+ / [x] -  [] AC Sheer []+ / [] -    [] GH drawer []+ / [] -    [] Bicep Load []+ / [] -  [] Crank []+ / [] -  [] Lindsay Hood []+ / [] -  [] Shaka Pascualter []+ / [] - [] Neer's []+ / [] -      [] Speed's [x]+ / [] -  [] Devanson's []+ / [x] -    [] Sulcus Sign []+ / [] -  [] Apprehension []+ / [] -  [] Bicep Load II []+ / [] -  [] Elbow Valgus []+ / [] -     [] Elbow Varus []+ / [] -  [] Brandon's relocation []+ / [] -   [] Empty Can []+ / [x] -  [] Drop arm []+ / [] -  [] ER lag []+ / [] -  [] Painful Arc []+ / [] -  [] Darby Moore []+ / [] -  [] Belly Press/ lift off[]+ / [x] -  [] Other:Spurling's  []+ / [x] -                  CURRENT STATUS:  Observations: well nourished female                                                       Palpation: Significantly Tender to palpation superior and anterior shoulder and R upper trap      Joint/Motion:  Right Shoulder:  AROM: 175° Forward elevation,  90° ER,  IR to 70, abd 170        Left Shoulder:  AROM: 170° Forward elevation,  90° ER,  IR to 80, abd 170*        Strength:  Right Shoulder: Flexion 4+/5,  Abduction 4/5, ER 5/5, IR 5 /5       Left Shoulder: Flexion 5/5,  Abduction 5/5, ER 5/5, IR 5/5         Special Tests/Functional Screens:    [] Kitty [x]+ / [] -  [] Wiley's [x]+ / [] -  [] AC Sheer []+ / [] -    [] GH drawer []+ / [] -    [] Bicep Load []+ / [] -  [] Crank []+ / [] -  [] Rocky Moreno []+ / [] -  [] Bernie Loera []+ / [] - [] Trey's []+ / [] -      [] Speed's []+ / [x] -  [] David's []+ / [x] -    [] Sulcus Sign []+ / [] -  [] Apprehension []+ / [] -  [] Bicep Load II []+ / [] -  [] Elbow Valgus []+ / [] -     [] Elbow Varus []+ / [] -  [] Brandon's relocation []+ / [] -   [] Empty Can []+ / [x] -  [] Drop arm []+ / [] -  [] ER lag []+ / [] -  [] Painful Arc []+ / [] -  [] Willie Pronto []+ / [] -  [] Belly Press/ lift off[]+ / [x] -  [] Other:Spurling's  []+ / [] -                  Short Term goals (3 weeks)  Decrease reported pain to 0-5/10 (goal met)   Increase ROM to AROM: 175° Forward elevation,  90° ER,  IR to 60, abd 170 (goal met)   Increase Strength to 4 to 4+/5  (goal met)   Able to perform/complete the following functions/tasks: pt able sleep with min pain/limitation. Pt able to perform household chores 20+ minutes with min pain/limitation. Pt able to carry 5 pounds at waist height for 50 ft with min pain/limitation.  (partial goal met)   QuickDASH (Disorders of the Arm, Shoulder, and Hand) 45% disability (not met)      Long Term goals (6 weeks)  Decrease reported pain to 0-3/10 (not met)   Increase ROM to AROM: 180° Forward elevation,  90° ER,  IR to 70, abd 180 (not met)   Increase Strength to 5- to 5/5  (not met)   Able to perform/complete the following functions/tasks: pt able to sleep through the night with no pain/limitation.   Pt able to perform household

## 2022-08-02 NOTE — PROGRESS NOTES
[address] [facility] and [facility]   Phone: [phone]   Fax: [phone]      Physical Therapy Daily Treatment Note    Date: 2022  Patient Name: Jody Lopez  : 1965   MRN: [id number]  DOInjury: 2 years  DOSx: NA   Referring Provider:      Medical Diagnosis: O79.029J (ICD-10-CM) - Strain of right shoulder, subsequent encounter       Outcome Measure:  Quickdash 47.73%    S: Pt reports pain 3/10 . O:   Time 0937-0317      Visit 12 Repeat outcome measure at mid point and end. Pain See above     ROM Joint/Motion:  Right Shoulder:  AROM: 175° Forward elevation,  90° ER,  IR to 70, abd 170        Left Shoulder:  AROM: 170° Forward elevation,  90° ER,  IR to 80, abd 170*     Modalities      ICE     Manual                  Stretch     Table slides flex and scap  HEP    Wall Flexion towel      Wall ER stretch     Towel IR stretch     IR reaching behind back     Sleeper stretch      IR behind back with wand     Exercise     Shrugs AROM     Pendulum Ex     UBE     Pulleys - flex     Pulleys-IR     Supine wand chest press     Supine wand flex    Supine wand ER/IR     Supine flexion 1#    S-lying ABD 1#    S-lying ER 1#    Supine serratus punch  1#    Standing wand flex          prone rows and y's      scap retraction hep    Standing flexion     Standing ABD     Bicep curls 2# eccentric, regular     ROWS: H  To aid in ROM and strength needed for reaching , lifting ,pushing and pulling at home/work    ROWS: M RTB; HEP ta   ROWS: L \"    ER RTB: HEP  ta   IR RTB: HEP ta               A:  Tolerated fair. Reassessment completed today. See note for details. Recommend referral back to physician. Pt demonstrates understanding. P: Will discharge pt at this time.        Veronica Goddard, [address] [facility] [address]    Treatment Charges: Mins Units   Initial Evaluation     Re-Evaluation 40 1   Ther Exercise         TE     Manual Therapy     MT     Ther Activities        TA     Gait Training          GT     Neuro Re-education NR     Modalities     Non-Billable Service Time     Other     Total Time/Units 40 1

## 2022-09-01 ENCOUNTER — OFFICE VISIT (OUTPATIENT)
Dept: ORTHOPEDIC SURGERY | Age: 57
End: 2022-09-01
Payer: COMMERCIAL

## 2022-09-01 VITALS — BODY MASS INDEX: 45.52 KG/M2 | HEIGHT: 67 IN | WEIGHT: 290 LBS

## 2022-09-01 DIAGNOSIS — M65.312 TRIGGER FINGER OF LEFT THUMB: ICD-10-CM

## 2022-09-01 DIAGNOSIS — G89.29 CHRONIC RIGHT SHOULDER PAIN: ICD-10-CM

## 2022-09-01 DIAGNOSIS — M77.12 LEFT LATERAL EPICONDYLITIS: ICD-10-CM

## 2022-09-01 DIAGNOSIS — M79.645 CHRONIC PAIN OF LEFT THUMB: ICD-10-CM

## 2022-09-01 DIAGNOSIS — M25.522 LEFT ELBOW PAIN: Primary | ICD-10-CM

## 2022-09-01 DIAGNOSIS — M79.645 THUMB PAIN, LEFT: ICD-10-CM

## 2022-09-01 DIAGNOSIS — M25.511 CHRONIC RIGHT SHOULDER PAIN: ICD-10-CM

## 2022-09-01 DIAGNOSIS — G89.29 CHRONIC PAIN OF LEFT THUMB: ICD-10-CM

## 2022-09-01 DIAGNOSIS — M18.12 ARTHRITIS OF CARPOMETACARPAL (CMC) JOINT OF LEFT THUMB: ICD-10-CM

## 2022-09-01 PROCEDURE — G8427 DOCREV CUR MEDS BY ELIG CLIN: HCPCS | Performed by: ORTHOPAEDIC SURGERY

## 2022-09-01 PROCEDURE — 99214 OFFICE O/P EST MOD 30 MIN: CPT | Performed by: ORTHOPAEDIC SURGERY

## 2022-09-01 PROCEDURE — G8417 CALC BMI ABV UP PARAM F/U: HCPCS | Performed by: ORTHOPAEDIC SURGERY

## 2022-09-01 PROCEDURE — 3017F COLORECTAL CA SCREEN DOC REV: CPT | Performed by: ORTHOPAEDIC SURGERY

## 2022-09-01 PROCEDURE — 20610 DRAIN/INJ JOINT/BURSA W/O US: CPT | Performed by: ORTHOPAEDIC SURGERY

## 2022-09-01 PROCEDURE — 1036F TOBACCO NON-USER: CPT | Performed by: ORTHOPAEDIC SURGERY

## 2022-09-01 PROCEDURE — 20550 NJX 1 TENDON SHEATH/LIGAMENT: CPT | Performed by: ORTHOPAEDIC SURGERY

## 2022-09-01 RX ORDER — BETAMETHASONE SODIUM PHOSPHATE AND BETAMETHASONE ACETATE 3; 3 MG/ML; MG/ML
6 INJECTION, SUSPENSION INTRA-ARTICULAR; INTRALESIONAL; INTRAMUSCULAR; SOFT TISSUE ONCE
Status: COMPLETED | OUTPATIENT
Start: 2022-09-01 | End: 2022-09-01

## 2022-09-01 RX ORDER — TRIAMCINOLONE ACETONIDE 40 MG/ML
80 INJECTION, SUSPENSION INTRA-ARTICULAR; INTRAMUSCULAR ONCE
Status: COMPLETED | OUTPATIENT
Start: 2022-09-01 | End: 2022-09-01

## 2022-09-01 RX ADMIN — BETAMETHASONE SODIUM PHOSPHATE AND BETAMETHASONE ACETATE 6 MG: 3; 3 INJECTION, SUSPENSION INTRA-ARTICULAR; INTRALESIONAL; INTRAMUSCULAR; SOFT TISSUE at 19:45

## 2022-09-01 RX ADMIN — TRIAMCINOLONE ACETONIDE 80 MG: 40 INJECTION, SUSPENSION INTRA-ARTICULAR; INTRAMUSCULAR at 19:45

## 2022-09-01 NOTE — PROGRESS NOTES
thumb.  The left elbow pain started today and is worse with extension of the wrist.  Denies any traumas that she can think of. Denies any other problems    [facility] Medical History:        Diagnosis Date    Diabetes mellitus ([address]     GERD (gastroesophageal reflux disease)     Hypertension     IBS (irritable bowel syndrome)      Past Surgical History:        Procedure Laterality Date    CHOLECYSTECTOMY  11/11/2011    FOOT SURGERY      FOOT SURGERY  03/2019    plantar faschia    HEEL SPUR SURGERY  12/2016    JOINT REPLACEMENT      toe joints replacement 3/2017 and 9/2017    JOINT REPLACEMENT      both big toes    PARTIAL HYSTERECTOMY (CERVIX NOT REMOVED)       Current Medications:   No current facility-administered medications for this visit. Allergies:  Demerol hcl [meperidine]    Social History:   TOBACCO:   reports that she has never smoked. She has never used smokeless tobacco.  ETOH:   reports no history of alcohol use. DRUGS:   reports no history of drug use.   ACTIVITIES OF DAILY LIVING:    OCCUPATION:    Family History:       Problem Relation Age of Onset    Alzheimer's Disease Father     Heart Disease Mother     Emphysema Mother     Other Other         RESPIRATORY DISEASE       REVIEW OF SYSTEMS:  Constitutional: Negative  Cardiovascular: Negative  Respiratory: Negative  Gastrointestinal: Negative  Musculoskeletal: See HPI  Neurological: See HPI  Behavioral/psychological: Negative    PHYSICAL EXAM:    VITALS:  Ht 5' 7\" (1.702 m)   Wt 290 lb (131.5 kg)   BMI 45.42 kg/m²   CONSTITUTIONAL:  awake, alert, cooperative, no apparent distress, and appears stated age  EYES:  Lids and lashes normal, pupils equal, round and reactive to light, extra ocular muscles intact, sclera clear, conjunctiva normal  ENT:  Normocephalic, without obvious abnormality, atraumatic, sinuses nontender on palpation, external ears without lesions, oral pharynx with moist mucus membranes, tonsils without erythema or exudates, gums normal and good dentition. NECK:  Supple, symmetrical, trachea midline, no adenopathy, thyroid symmetric, not enlarged and no tenderness, skin normal  LUNGS:  CTA  CARDIOVASCULAR:  2+ radial pulses, extremities warm and well perfused  ABDOMEN: Nondistended, NTTP  CHEST:  Atraumatic   GENITAL/URINARY:  deferred  NEUROLOGIC:  Awake, alert, oriented to name, place and time. Cranial nerves II-XII are grossly intact. Motor is 5 out of 5 bilaterally.   Sensory is intact.  gait is normal.  MUSCULOSKELETAL:    Right upper extremity:  Skin intact circumferentially  +TTP about the entire forearm  Positive Tinel's of the cubital tunnel  Positive Durkan's compression test  Positive Tinel's the carpal tunnel  Positive shoulder impingement  Negative Spurling's test  Shoulder forward flexion 180 degrees  Shoulder abduction 170 degrees  Shoulder internal rotation to T12  Shoulder external rotation 45 degrees  Compartments soft and compressible  +AIN/PIN/Ulnar nerve function intact grossly  +Radial pulse, Brisk Cap refill, hand warm and perfused  Sensation intact to touch in radial/ulnar/median nerve distributions to hand    Left upper extremity:  Skin intact circumferentially  Positive Tinel's at the carpal tunnel  Positive Durkan's compression test  Negative Tinel's at the cubital tunnel  Pain at the lateral epicondyle  Positive triggering over the thumb   Positive CMC grind  Compartments soft and compressible  +AIN/PIN/Ulnar nerve function intact grossly  +Radial pulse, Brisk Cap refill, hand warm and perfused  Sensation intact to touch in radial/ulnar/median nerve distributions to hand    DATA:    CBC:   Lab Results   Component Value Date/Time    WBC 7.8 03/06/2021 04:06 PM    RBC 4.70 03/06/2021 04:06 PM    HGB 13.1 03/06/2021 04:06 PM    HCT 43.3 03/06/2021 04:06 PM    MCV 92.1 03/06/2021 04:06 PM    MCH 27.9 03/06/2021 04:06 PM    MCHC 30.3 03/06/2021 04:06 PM    RDW 14.9 03/06/2021 04:06 PM     03/06/2021 04:06 PM    MPV 10.8 03/06/2021 04:06 PM     PT/INR:    Lab Results   Component Value Date/Time    PROTIME 11.8 05/15/2015 06:17 AM    PROTIME 11.1 05/18/2012 08:44 PM    INR 1.1 05/15/2015 06:17 AM       Radiology Review:   Xray: x-rays of the right wrist were reviewed with the patient. 4 views: AP/oblique/lateral/carpal tunnel view: demonstrate no acute osseous abnormalities. No fractures or dislocations noted. Impression: No acute osseous abnormalities. Xray: x-rays of the left wrist were reviewed with the patient. 4 views: AP/oblique/lateral/carpal tunnel view: demonstrate no acute osseous abnormalities. No fractures or dislocations noted. Impression: No acute osseous abnormalities. Xray: x-rays of the right shoulder were reviewed with the patient. 3 views: AP/scapular Y/axillary view: demonstrate no acute osseous abnormalities. No fractures or dislocations noted. Impression: Acute osseous abnormalities. Xray: x-rays of the right elbow obtained 4/6/2022 were reviewed today in the office and reviewed with the patient. 3 views: AP/lateral/oblique view: demonstrate chronic small osseous avulsion off the medial coronoid. Elbow joint is congruous with no fractures or dislocations noted. Impression: No acute osseous abnormalities    Xray: x-rays of the left elbow and left thumb were obtained today in the office and reviewed with the patient. 2 views: Left elbow: demonstrate no acute fracture dislocation. 3 views left thumb, open oblique, AP, lateral demonstrate no acute fracture dislocation there is mild arthritis of the thumb CMC joint  Impression: Mild CMC arthritis left thumb, no acute fractures or dislocations    Assessment:  Right shoulder impingement  Lateral epicondylitis on the left  Left CMC arthritis  Bilateral carpal tunnel syndrome right worse than left  Right cubital tunnel syndrome  Diabetes    Plan:   Discussed diagnosis and management with patient.   She had good pain relief with initial steroid injection to her shoulder. She states that her shoulder feels stronger and her range of motion is better. We will attempt another corticosteroid injection at this time to the shoulder. Discussed with her that we could do another 1 of these no sooner than 3 months from now. If pain worsens or fails to get better from this point we would consider a MRI of the right shoulder. Discussed that patient is suffering from lateral epicondylitis on the left side, advised her to obtain a counterforce brace and are giving her home exercises to complete. For triggering of the left thumb corticosteroid injection was performed today. All questions were answered. Patient will follow-up in 3 months      Procedure Note Trigger Finger Injection    The left Thumb A1 pulley was identified as the injection site. The risk and benefits of a cortisone injection were explained and the patient consented to the injection. Under sterile conditions, the digit was injected with a mixture of 1 mL of .25% Marcaine and 1 mL of 6 mg/mL Betamethasone without complication. A sterile bandage was applied. Procedure Note Cortisone Injection to Shoulder    The right shoulder was identified as the injection site. The risk and benefits of a cortisone injection were explained and the patient consented to the injection. Under sterile conditions, the shoulder SAS was injected with a mixture of 80mg of Kenelog 4 mL of 0.25% Bupivicaine without complication. A sterile bandage was applied. I have seen and evaluated the patient and agree with the above assessment and plan on today's visit. I have performed the key components of the history and physical examination with significant findings of new onset left lateral elbow pain and left thumb locked trigger and extension with concurrent mild to moderate thumb basal joint arthritis. These findings were explained. Injections provided the shoulder and left thumb A1 pulley.   Patient was instructed on counterforce bracing/arm BandIT and home exercise program for tennis elbow. Lastly discussed possible surgical intervention on the shoulder if her symptoms remain recalcitrant. I concur with the findings and plan as documented.     Berenice Bell MD  9/1/2022

## 2022-11-10 ENCOUNTER — OFFICE VISIT (OUTPATIENT)
Dept: ORTHOPEDIC SURGERY | Age: 57
End: 2022-11-10
Payer: COMMERCIAL

## 2022-11-10 VITALS — BODY MASS INDEX: 45.99 KG/M2 | HEIGHT: 67 IN | WEIGHT: 293 LBS

## 2022-11-10 DIAGNOSIS — G89.29 CHRONIC RIGHT SHOULDER PAIN: ICD-10-CM

## 2022-11-10 DIAGNOSIS — M25.511 CHRONIC RIGHT SHOULDER PAIN: ICD-10-CM

## 2022-11-10 DIAGNOSIS — M75.21 BICEPS TENDINITIS OF RIGHT SHOULDER: Primary | ICD-10-CM

## 2022-11-10 PROCEDURE — 3017F COLORECTAL CA SCREEN DOC REV: CPT | Performed by: ORTHOPAEDIC SURGERY

## 2022-11-10 PROCEDURE — G8417 CALC BMI ABV UP PARAM F/U: HCPCS | Performed by: ORTHOPAEDIC SURGERY

## 2022-11-10 PROCEDURE — 99214 OFFICE O/P EST MOD 30 MIN: CPT | Performed by: ORTHOPAEDIC SURGERY

## 2022-11-10 PROCEDURE — G8484 FLU IMMUNIZE NO ADMIN: HCPCS | Performed by: ORTHOPAEDIC SURGERY

## 2022-11-10 PROCEDURE — 1036F TOBACCO NON-USER: CPT | Performed by: ORTHOPAEDIC SURGERY

## 2022-11-10 PROCEDURE — G8427 DOCREV CUR MEDS BY ELIG CLIN: HCPCS | Performed by: ORTHOPAEDIC SURGERY

## 2022-11-10 NOTE — PROGRESS NOTES
Procedure Laterality Date    CHOLECYSTECTOMY  11/11/2011    FOOT SURGERY      FOOT SURGERY  03/2019    plantar faschia    HEEL SPUR SURGERY  12/2016    JOINT REPLACEMENT      toe joints replacement 3/2017 and 9/2017    JOINT REPLACEMENT      both big toes    PARTIAL HYSTERECTOMY (CERVIX NOT REMOVED)       Current Medications:   No current facility-administered medications for this visit. Allergies:  Demerol hcl [meperidine]    Social History:   TOBACCO:   reports that she has never smoked. She has never used smokeless tobacco.  ETOH:   reports no history of alcohol use. DRUGS:   reports no history of drug use. ACTIVITIES OF DAILY LIVING:    OCCUPATION:    Family History:       Problem Relation Age of Onset    Alzheimer's Disease Father     Heart Disease Mother     Emphysema Mother     Other Other         RESPIRATORY DISEASE       REVIEW OF SYSTEMS:  Constitutional: Negative  Cardiovascular: Negative  Respiratory: Negative  Gastrointestinal: Negative  Musculoskeletal: See HPI  Neurological: See HPI  Behavioral/psychological: Negative    PHYSICAL EXAM:    VITALS:  Ht 5' 7\" (1.702 m)   Wt 295 lb (133.8 kg)   BMI 46.20 kg/m²   CONSTITUTIONAL:  awake, alert, cooperative, no apparent distress, and appears stated age  EYES:  Lids and lashes normal, pupils equal, round and reactive to light, extra ocular muscles intact, sclera clear, conjunctiva normal  ENT:  Normocephalic, without obvious abnormality, atraumatic, sinuses nontender on palpation, external ears without lesions, oral pharynx with moist mucus membranes, tonsils without erythema or exudates, gums normal and good dentition.   NECK:  Supple, symmetrical, trachea midline, no adenopathy, thyroid symmetric, not enlarged and no tenderness, skin normal  LUNGS:  CTA  CARDIOVASCULAR:  2+ radial pulses, extremities warm and well perfused  ABDOMEN: Nondistended, NTTP  CHEST:  Atraumatic   GENITAL/URINARY:  deferred  NEUROLOGIC:  Awake, alert, oriented to name, place and time. Cranial nerves II-XII are grossly intact. Motor is 5 out of 5 bilaterally. Sensory is intact.  gait is normal.  MUSCULOSKELETAL:    Right upper extremity:  Skin intact circumferentially  +TTP about the entire forearm  Positive Tinel's of the cubital tunnel  Positive Durkan's compression test  Positive Tinel's the carpal tunnel  Positive shoulder impingement  Negative Spurling's test  Shoulder forward flexion 180 degrees  Shoulder abduction 170 degrees  Shoulder internal rotation to T12  Shoulder external rotation 45 degrees  Compartments soft and compressible  +AIN/PIN/Ulnar nerve function intact grossly  +Radial pulse, Brisk Cap refill, hand warm and perfused  Sensation intact to touch in radial/ulnar/median nerve distributions to hand    Left upper extremity:  Skin intact circumferentially  Positive Tinel's at the carpal tunnel  Positive Durkan's compression test  Negative Tinel's at the cubital tunnel  Compartments soft and compressible  +AIN/PIN/Ulnar nerve function intact grossly  +Radial pulse, Brisk Cap refill, hand warm and perfused  Sensation intact to touch in radial/ulnar/median nerve distributions to hand  Locked trigger thumb. Positive tenderness over the index and middle fingers more so than the ring finger A1 pulleys. DATA:    CBC:   Lab Results   Component Value Date/Time    WBC 7.8 03/06/2021 04:06 PM    RBC 4.70 03/06/2021 04:06 PM    HGB 13.1 03/06/2021 04:06 PM    HCT 43.3 03/06/2021 04:06 PM    MCV 92.1 03/06/2021 04:06 PM    MCH 27.9 03/06/2021 04:06 PM    MCHC 30.3 03/06/2021 04:06 PM    RDW 14.9 03/06/2021 04:06 PM     03/06/2021 04:06 PM    MPV 10.8 03/06/2021 04:06 PM     PT/INR:    Lab Results   Component Value Date/Time    PROTIME 11.8 05/15/2015 06:17 AM    PROTIME 11.1 05/18/2012 08:44 PM    INR 1.1 05/15/2015 06:17 AM       Radiology Review:   Xray: x-rays of the right wrist were  reviewed with the patient.  4 views: AP/oblique/lateral/carpal tunnel view: demonstrate no acute osseous abnormalities. No fractures or dislocations noted. Impression: No acute osseous abnormalities. Xray: x-rays of the left wrist were  reviewed with the patient. 4 views: AP/oblique/lateral/carpal tunnel view: demonstrate no acute osseous abnormalities. No fractures or dislocations noted. Impression: No acute osseous abnormalities. Xray: x-rays of the right shoulder were  reviewed with the patient. 3 views: AP/scapular Y/axillary view: demonstrate no acute osseous abnormalities. No fractures or dislocations noted. Impression: Acute osseous abnormalities. Xray: x-rays of the right elbow obtained 4/6/2022 were reviewed today in the office and reviewed with the patient. 3 views: AP/lateral/oblique view: demonstrate chronic small osseous avulsion off the medial coronoid. Elbow joint is congruous with no fractures or dislocations noted. Impression: No acute osseous abnormalities    IMPRESSION:  Right shoulder impingement: Recalcitrant to cortisone injection and formal therapy  Bilateral carpal tunnel syndrome right worse than left  Locked left thumb trigger  Left index and middle and ring finger triggers  Right cubital tunnel syndrome  Diabetes    PLAN:    Today's findings were explained. She still having persistent recalcitrant right shoulder pain. An MRI of the shoulder was ordered. Discussed further treatment options occluding corticosteroid injections as well as continue therapy versus surgical invention depending on MRI results. Regards to her left hand. She like to proceed with a left carpal tunnel release, left thumb trigger release, and index and middle finger trigger releases. Postoperative course was explained. Anticipate possible need for therapy afterwards. All questions answered.       I explained the risks, benefits, alternatives and complications of surgery with the patient including but not limited to the risks of infection, possible damage to nerves, vessels, or tendons, stiffness, loss of range of motion, scar sensitivity, wound healing complications, worsening symptoms, possible need for therapy, as well as the possible need further surgery and unanticipated complications. The patient voiced understanding and all questions were answered. The patient elected to proceed with surgical intervention.      Franklin Sierra MD  11/10/2022

## 2022-11-29 ENCOUNTER — HOSPITAL ENCOUNTER (OUTPATIENT)
Dept: MRI IMAGING | Age: 57
Discharge: HOME | End: 2022-12-01
Payer: COMMERCIAL

## 2022-11-29 DIAGNOSIS — G89.29 CHRONIC RIGHT SHOULDER PAIN: ICD-10-CM

## 2022-11-29 DIAGNOSIS — M25.511 CHRONIC RIGHT SHOULDER PAIN: ICD-10-CM

## 2022-11-29 DIAGNOSIS — M75.21 BICEPS TENDINITIS OF RIGHT SHOULDER: ICD-10-CM

## 2022-11-29 PROCEDURE — 73221 MRI JOINT UPR EXTREM W/O DYE: CPT

## 2022-12-06 ENCOUNTER — PREP FOR PROCEDURE (OUTPATIENT)
Dept: ORTHOPEDIC SURGERY | Age: 57
End: 2022-12-06

## 2022-12-06 RX ORDER — SODIUM CHLORIDE 0.9 % (FLUSH) 0.9 %
5-40 SYRINGE (ML) INJECTION PRN
OUTPATIENT
Start: 2022-12-06

## 2022-12-06 RX ORDER — SODIUM CHLORIDE 9 MG/ML
INJECTION, SOLUTION INTRAVENOUS CONTINUOUS
OUTPATIENT
Start: 2022-12-06

## 2022-12-06 RX ORDER — SODIUM CHLORIDE 9 MG/ML
INJECTION, SOLUTION INTRAVENOUS PRN
OUTPATIENT
Start: 2022-12-06

## 2022-12-06 RX ORDER — SODIUM CHLORIDE 0.9 % (FLUSH) 0.9 %
5-40 SYRINGE (ML) INJECTION EVERY 12 HOURS SCHEDULED
OUTPATIENT
Start: 2022-12-06

## 2022-12-19 ENCOUNTER — OFFICE VISIT (OUTPATIENT)
Dept: ORTHOPEDIC SURGERY | Age: 57
End: 2022-12-19
Payer: COMMERCIAL

## 2022-12-19 VITALS — HEIGHT: 67 IN | WEIGHT: 293 LBS | BODY MASS INDEX: 45.99 KG/M2

## 2022-12-19 DIAGNOSIS — S46.011S TRAUMATIC INCOMPLETE TEAR OF RIGHT ROTATOR CUFF, SEQUELA: Primary | ICD-10-CM

## 2022-12-19 PROCEDURE — G8484 FLU IMMUNIZE NO ADMIN: HCPCS | Performed by: ORTHOPAEDIC SURGERY

## 2022-12-19 PROCEDURE — 20610 DRAIN/INJ JOINT/BURSA W/O US: CPT | Performed by: ORTHOPAEDIC SURGERY

## 2022-12-19 PROCEDURE — 99214 OFFICE O/P EST MOD 30 MIN: CPT | Performed by: ORTHOPAEDIC SURGERY

## 2022-12-19 PROCEDURE — 1036F TOBACCO NON-USER: CPT | Performed by: ORTHOPAEDIC SURGERY

## 2022-12-19 PROCEDURE — G8427 DOCREV CUR MEDS BY ELIG CLIN: HCPCS | Performed by: ORTHOPAEDIC SURGERY

## 2022-12-19 PROCEDURE — 3017F COLORECTAL CA SCREEN DOC REV: CPT | Performed by: ORTHOPAEDIC SURGERY

## 2022-12-19 PROCEDURE — G8417 CALC BMI ABV UP PARAM F/U: HCPCS | Performed by: ORTHOPAEDIC SURGERY

## 2022-12-19 RX ORDER — TRIAMCINOLONE ACETONIDE 40 MG/ML
80 INJECTION, SUSPENSION INTRA-ARTICULAR; INTRAMUSCULAR ONCE
Status: COMPLETED | OUTPATIENT
Start: 2022-12-19 | End: 2022-12-19

## 2022-12-19 RX ADMIN — TRIAMCINOLONE ACETONIDE 80 MG: 40 INJECTION, SUSPENSION INTRA-ARTICULAR; INTRAMUSCULAR at 10:00

## 2022-12-19 NOTE — PROGRESS NOTES
Department of Orthopedic Surgery  History and Physical      CHIEF COMPLAINT: MRI follow up    HISTORY OF PRESENT ILLNESS:                The patient is a right-hand-dominant 59 y.o. female who presents with bilateral hand paresthesias and right upper arm burning and shoulder pain. Reports that July 2020 she fell down 2 steps and \"broke my elbow\". Since that time she has been experiencing paresthesias to the right upper extremity. She is also complaining of a 2-month history of bilateral hand numbness that ebbs and flows throughout the day. There are no specific exacerbating or relieving factors nor particular times of the day that the paresthesias are worse. Denies symptoms worse at night. Patient complaining of upper arm burning and aching pain. She has trialed over-the-counter anti-inflammatories and chiropractic medicine without much relief in her symptoms. Patient had an EMG/NCS dated 4/5/2022    Right median nerve motor latency: 3.80 ms  Right ulnar nerve velocity: Above the elbow 62 ms, below the elbow 52 ms  Right median nerve sensory latency: 2.81 ms    EMG portion of the exam demonstrates borderline median nerve mononeuropathy. Patient is scheduled for a left thumb, index and middle finger trigger release with left carpal tunnel release next week. She follows up here for MRI follow-up of her right shoulder. She did have an injection 3 months ago which provided her 3 weeks of relief of pain to her right shoulder. She did complete therapy. She reports therapy did help improve her range of motion and strength to her right shoulder. She still reports persistent pain to the right shoulder.      Past Medical History:        Diagnosis Date    Diabetes mellitus ([address]     GERD (gastroesophageal reflux disease)     Hypertension     IBS (irritable bowel syndrome)      Past Surgical History:        Procedure Laterality Date    CHOLECYSTECTOMY  11/11/2011    FOOT SURGERY      FOOT SURGERY  03/2019 plantar faschia    HEEL SPUR SURGERY  12/2016    JOINT REPLACEMENT      toe joints replacement 3/2017 and 9/2017    JOINT REPLACEMENT      both big toes    PARTIAL HYSTERECTOMY (CERVIX NOT REMOVED)       Current Medications:   No current facility-administered medications for this visit. Allergies:  Norco [hydrocodone-acetaminophen] and Demerol hcl [meperidine]    Social History:   TOBACCO:   reports that she has never smoked. She has never used smokeless tobacco.  ETOH:   reports no history of alcohol use. DRUGS:   reports no history of drug use. ACTIVITIES OF DAILY LIVING:    OCCUPATION:    Family History:       Problem Relation Age of Onset    Alzheimer's Disease Father     Heart Disease Mother     Emphysema Mother     Other Other         RESPIRATORY DISEASE       REVIEW OF SYSTEMS:  Constitutional: Negative  Cardiovascular: Negative  Respiratory: Negative  Gastrointestinal: Negative  Musculoskeletal: Right shoulder pain  Neurological: left hand numbness and tingling  Behavioral/psychological: Negative    PHYSICAL EXAM:    VITALS:  Ht 5' 7\" (1.702 m)   Wt (!) 305 lb (138.3 kg)   BMI 47.77 kg/m²   CONSTITUTIONAL:  awake, alert, cooperative, no apparent distress, and appears stated age  EYES:  Lids and lashes normal, pupils equal, round and reactive to light, extra ocular muscles intact, sclera clear, conjunctiva normal  ENT:  Normocephalic, without obvious abnormality, atraumatic, sinuses nontender on palpation, external ears without lesions, oral pharynx with moist mucus membranes, tonsils without erythema or exudates, gums normal and good dentition. NECK:  Supple, symmetrical, trachea midline, no adenopathy, thyroid symmetric, not enlarged and no tenderness, skin normal  NEUROLOGIC:  Awake, alert, oriented to name, place and time. Cranial nerves II-XII are grossly intact. Motor is 5 out of 5 bilaterally.   Sensory is intact.  gait is normal.  MUSCULOSKELETAL:    Right upper extremity:  Skin intact circumferentially  Positive shoulder impingement  + tenderness over the long head of the biceps tendon  Shoulder forward flexion 180 degrees  Shoulder abduction 170 degrees  Shoulder internal rotation to T12  Shoulder external rotation 45 degrees  Supraspinatus strength 4/5, subscapularis strength 4/5, teres minor strength 4/5, infraspinatus strength 4/5  Compartments soft and compressible  +AIN/PIN/Ulnar nerve function intact grossly  +Radial pulse, Brisk Cap refill, hand warm and perfused  Sensation intact to touch in radial/ulnar/median nerve distributions to hand    DATA:    CBC:   Lab Results   Component Value Date/Time    WBC 7.8 03/06/2021 04:06 PM    RBC 4.70 03/06/2021 04:06 PM    HGB 13.1 03/06/2021 04:06 PM    HCT 43.3 03/06/2021 04:06 PM    MCV 92.1 03/06/2021 04:06 PM    MCH 27.9 03/06/2021 04:06 PM    MCHC 30.3 03/06/2021 04:06 PM    RDW 14.9 03/06/2021 04:06 PM     03/06/2021 04:06 PM    MPV 10.8 03/06/2021 04:06 PM     PT/INR:    Lab Results   Component Value Date/Time    PROTIME 11.8 05/15/2015 06:17 AM    PROTIME 11.1 05/18/2012 08:44 PM    INR 1.1 05/15/2015 06:17 AM       Radiology Review:   MRI of the right shoulder were reviewed in coronal, sagittal, axial planes from 11/29/22    FINDINGS:   ROTATOR CUFF:  Full-thickness partial width tear of the supraspinatus tendon   critical zone measuring 16 x 13 mm. This is at the central tendon fibers. Tendon is partially retracted to the lateral 3rd of the humeral head. No   significant muscle edema or atrophy. Intact infraspinatus tendon. Low-grade partial-thickness articular surface   tear of the superior subscapularis tendon measuring approximately 6 mm. Intact teres minor tendon. BICEPS TENDON: Intact vertical and horizontal portions of the long head of   the biceps tendon. LABRUM: The glenoid labrum is intact to the extent that it is visualized.  No   paralabral cyst.       GLENOHUMERAL JOINT: Physiologic amount of joint fluid. No evidence of   high-grade cartilage loss. Normal alignment. AC JOINT AND ACROMIOCLAVICULAR ARCH:       Acromion shape: The acromion is flat. Subacromial spur: No       Arthritis: Capsular hypertrophy with osteophytes. BONE MARROW: No evidence of fracture. Normal marrow signal.       OUTLET SPACES:       Fluid in the subacromial/subdeltoid bursa related to rotator cuff tear. Unremarkable MRI appearance of the quadrilateral space. Mild narrowing of the supraspinatus outlet. Thickening of the inferior glenohumeral ligament and scarring or intermediate   signal in the rotator interval.           Impression   1. Full-thickness partial width tear of the supraspinatus tendon with tendon   partially retracted to the lateral humeral head. No associated muscle   atrophy. Additional low-grade partial-thickness tear of the superior   subscapularis tendon. 2. Mild acromioclavicular joint osteoarthritis. 3. Findings associated with adhesive capsulitis. IMPRESSION:  Right shoulder rotator cuff rear  Bilateral carpal tunnel syndrome right worse than left  Locked left thumb trigger  Left index and middle and ring finger triggers  Right cubital tunnel syndrome  Diabetes    PLAN:  Discussed findings with patient. Patient is having surgery to her left upper extremity next week. Did discuss she does have a large rotator cuff tear on the right. Recommended surgical repair once she is recovered from surgery on her left. Plan for a right shoulder arthroscopy with decompression, biceps tenotomy vs tenodesis and right shoulder rotator cuff repair. Postoperative course explained to the patient. Patient would like to proceed. All questions answered.     I explained the risks, benefits, alternatives and complications of surgery with the patient including but not limited to the risks of infection, possible damage to nerves, vessels, or tendons, stiffness, loss of range of motion, scar sensitivity, wound healing complications, worsening symptoms, possible need for therapy, as well as the possible need further surgery and unanticipated complications. The patient voiced understanding and all questions were answered. The patient elected to proceed with surgical intervention. Procedure Note Cortisone Injection to Shoulder    The right shoulder was identified as the injection site. The risk and benefits of a cortisone injection were explained and the patient consented to the injection. Under sterile conditions, the shoulder SAS was injected with a mixture of 80mg of Kenelog and 4 mL of 5% Ropivacaine and 4 mL of 1% Lidocaine without complication. A sterile bandage was applied. I have seen and evaluated the patient and agree with the above assessment and plan on today's visit. I have performed the key components of the history and physical examination with significant findings of right shoulder rotator cuff tear. I concur with the findings and plan as documented.     Adina Morris MD  12/19/2022

## 2022-12-27 ENCOUNTER — ANESTHESIA EVENT (OUTPATIENT)
Dept: OPERATING ROOM | Age: 57
End: 2022-12-27
Payer: COMMERCIAL

## 2022-12-27 RX ORDER — ALBUTEROL SULFATE 90 UG/1
AEROSOL, METERED RESPIRATORY (INHALATION)
COMMUNITY

## 2022-12-27 RX ORDER — FAMOTIDINE 40 MG/1
TABLET, FILM COATED ORAL
COMMUNITY

## 2022-12-27 RX ORDER — BENZONATATE 100 MG/1
CAPSULE ORAL
COMMUNITY

## 2022-12-27 RX ORDER — UBIDECARENONE 75 MG
CAPSULE ORAL
COMMUNITY

## 2022-12-27 RX ORDER — ALBUTEROL SULFATE 2.5 MG/3ML
SOLUTION RESPIRATORY (INHALATION)
COMMUNITY

## 2022-12-27 NOTE — DISCHARGE INSTRUCTIONS
DISCHARGE INSTRUCTIONS TO HOME FOLLOWING SURGERY    ACTIVITY INSTRUCTIONS:    Elevate extremity to help reduce swelling. No heavy lifting, pushing, pulling or strenuous activity    WOUND/DRESSING INSTRUCTIONS:  Always ensure you and your care giver clean hands before and after caring for the wound. May change dressing after 24-48 hours  OK to shower if wound is clean and dry. Do not soak surgical area. Reapply new clean dressing/BandAid. Can ice surgical region to help reduce swelling, Do not apply ice to fingers or toes       MEDICATION INSTRUCTIONS:  Take pain medicine as directed. When taking pain medications, you may experience dizziness or drowsiness. Do not drink alcohol or drive when taking these medications. Do not take any other medication containing acetaminophen (Tylenol) while taking the prescribed pain medication. Ibuprofen, Aleve, Motrin, or Naproxen are okay but should not be taken on an empty stomach. *Watch for these significant complications:  Call physician if these or any other problems occur:  Fever over 101°, redness, swelling or warmth at the operative site  Unrelieved nausea    Foul smelling or cloudy drainage at the operative site   Unrelieved pain  Breathing issues such as shortness of breath or difficulty breathing    Blood soaked dressing. (Some oozing may be normal)     Numb, pale, blue, cold or tingling extremity       Make an appointment to be seen 8-10 days after surgery  FOLLOW-UP CARE:    Dr. Yudith García.  [address]ikh[address]ouser [address]  [facility]  [phone]

## 2022-12-27 NOTE — PROGRESS NOTES
[facility] PRE-ADMISSION TESTING INSTRUCTIONS    The Preadmission Testing patient is instructed accordingly using the following criteria (check applicable):    ARRIVAL INSTRUCTIONS:  [x] Parking the day of Surgery is located in the Main Entrance lot. Upon entering the door, make an immediate right to the surgery reception desk    [x] Bring photo ID and insurance card    [] Bring in a copy of Living will or Durable Power of  papers. [x] Please be sure to arrange for responsible adult to provide transportation to and from the hospital    [x] Please arrange for responsible adult to be with you for the 24 hour period post procedure due to having anesthesia    [x] If you awake am of surgery not feeling well or have temperature >100 please call [phone]    GENERAL INSTRUCTIONS:    [x] Nothing by mouth after midnight, including gum, candy, mints or water    [x] You may brush your teeth, but do not swallow any water    [x] Take medications as instructed with 1-2 oz of water    [x] Stop herbal supplements and vitamins 5 days prior to procedure    [] Follow preop dosing of blood thinners per physician instructions    [] Take 1/2 dose of evening insulin, but no insulin after midnight    [x] No oral diabetic medications after midnight    [x] If diabetic and have low blood sugar or feel symptomatic, take 1-2oz apple juice only    [x] Bring inhalers day of surgery    [] Bring C-PAP/ Bi-Pap day of surgery    [] Bring urine specimen day of surgery    [] Shower or bath with soap, lather and rinse well, AM of Surgery, no lotion, powders or creams to surgical site    [] Follow bowel prep as instructed per surgeon    [] No tobacco products within 24 hours of surgery     [x] No alcohol or illegal drug use within 24 hours of surgery.     [x] Jewelry, body piercing's, eyeglasses, contact lenses and dentures are not permitted into surgery (bring cases)      [x] Please do not wear any nail polish, make up or hair products on the day of surgery    [x] You can expect a call the business day prior to procedure to notify you if your arrival time changes    [x] If you receive a survey after surgery we would greatly appreciate your comments    [] Parent/guardian of a minor must accompany their child and remain on the premises  the entire time they are under our care     [] Pediatric patients may bring favorite toy, blanket or comfort item with them    [] A caregiver or family member must remain with the patient during their stay if they are mentally handicapped, have dementia, disoriented or unable to use a call light or would be a safety concern if left unattended    [x] Please notify surgeon if you develop any illness between now and time of surgery (cold, cough, sore throat, fever, nausea, vomiting) or any signs of infections  including skin, wounds, and dental.    [x]  The Outpatient Pharmacy is available to fill your prescription here on your day of surgery, ask your preop nurse for details    [] Other instructions    EDUCATIONAL MATERIALS PROVIDED:    [] PAT Preoperative Education Packet/Booklet     [] Medication List    [] Transfusion bracelet applied with instructions    [] Shower with soap, lather and rinse well, and use CHG wipes provided the evening before surgery as instructed    [] Incentive spirometer with instructions

## 2022-12-28 ENCOUNTER — HOSPITAL ENCOUNTER (OUTPATIENT)
Age: 57
Setting detail: OUTPATIENT SURGERY
Discharge: HOME | End: 2022-12-28
Attending: ORTHOPAEDIC SURGERY | Admitting: ORTHOPAEDIC SURGERY
Payer: COMMERCIAL

## 2022-12-28 ENCOUNTER — ANESTHESIA (OUTPATIENT)
Dept: OPERATING ROOM | Age: 57
End: 2022-12-28
Payer: COMMERCIAL

## 2022-12-28 VITALS
BODY MASS INDEX: 45.99 KG/M2 | OXYGEN SATURATION: 96 % | RESPIRATION RATE: 18 BRPM | WEIGHT: 293 LBS | HEART RATE: 77 BPM | TEMPERATURE: 97.7 F | HEIGHT: 67 IN | SYSTOLIC BLOOD PRESSURE: 162 MMHG | DIASTOLIC BLOOD PRESSURE: 79 MMHG

## 2022-12-28 DIAGNOSIS — G89.18 POST-OPERATIVE PAIN: Primary | ICD-10-CM

## 2022-12-28 LAB — METER GLUCOSE: 152 MG/DL (ref 74–99)

## 2022-12-28 PROCEDURE — 82962 GLUCOSE BLOOD TEST: CPT

## 2022-12-28 PROCEDURE — 7100000010 HC PHASE II RECOVERY - FIRST 15 MIN: Performed by: ORTHOPAEDIC SURGERY

## 2022-12-28 PROCEDURE — 3700000000 HC ANESTHESIA ATTENDED CARE: Performed by: ORTHOPAEDIC SURGERY

## 2022-12-28 PROCEDURE — 2500000003 HC RX 250 WO HCPCS: Performed by: ORTHOPAEDIC SURGERY

## 2022-12-28 PROCEDURE — 3600000002 HC SURGERY LEVEL 2 BASE: Performed by: ORTHOPAEDIC SURGERY

## 2022-12-28 PROCEDURE — 6360000002 HC RX W HCPCS

## 2022-12-28 PROCEDURE — 6360000002 HC RX W HCPCS: Performed by: PHYSICIAN ASSISTANT

## 2022-12-28 PROCEDURE — 2580000003 HC RX 258: Performed by: PHYSICIAN ASSISTANT

## 2022-12-28 PROCEDURE — 3700000001 HC ADD 15 MINUTES (ANESTHESIA): Performed by: ORTHOPAEDIC SURGERY

## 2022-12-28 PROCEDURE — 64721 CARPAL TUNNEL SURGERY: CPT | Performed by: ORTHOPAEDIC SURGERY

## 2022-12-28 PROCEDURE — 2709999900 HC NON-CHARGEABLE SUPPLY: Performed by: ORTHOPAEDIC SURGERY

## 2022-12-28 PROCEDURE — 3600000012 HC SURGERY LEVEL 2 ADDTL 15MIN: Performed by: ORTHOPAEDIC SURGERY

## 2022-12-28 PROCEDURE — 6370000000 HC RX 637 (ALT 250 FOR IP)

## 2022-12-28 PROCEDURE — 7100000011 HC PHASE II RECOVERY - ADDTL 15 MIN: Performed by: ORTHOPAEDIC SURGERY

## 2022-12-28 PROCEDURE — 26055 INCISE FINGER TENDON SHEATH: CPT | Performed by: ORTHOPAEDIC SURGERY

## 2022-12-28 PROCEDURE — 6360000002 HC RX W HCPCS: Performed by: NURSE ANESTHETIST, CERTIFIED REGISTERED

## 2022-12-28 RX ORDER — OXYCODONE HYDROCHLORIDE AND ACETAMINOPHEN 5; 325 MG/1; MG/1
1 TABLET ORAL ONCE
Status: COMPLETED | OUTPATIENT
Start: 2022-12-28 | End: 2022-12-28

## 2022-12-28 RX ORDER — SODIUM CHLORIDE 9 MG/ML
INJECTION, SOLUTION INTRAVENOUS PRN
Status: DISCONTINUED | OUTPATIENT
Start: 2022-12-28 | End: 2022-12-28 | Stop reason: HOSPADM

## 2022-12-28 RX ORDER — OXYCODONE HYDROCHLORIDE AND ACETAMINOPHEN 5; 325 MG/1; MG/1
TABLET ORAL
Status: COMPLETED
Start: 2022-12-28 | End: 2022-12-28

## 2022-12-28 RX ORDER — PROPOFOL 10 MG/ML
INJECTION, EMULSION INTRAVENOUS PRN
Status: DISCONTINUED | OUTPATIENT
Start: 2022-12-28 | End: 2022-12-28 | Stop reason: SDUPTHER

## 2022-12-28 RX ORDER — FENTANYL CITRATE 50 UG/ML
INJECTION, SOLUTION INTRAMUSCULAR; INTRAVENOUS PRN
Status: DISCONTINUED | OUTPATIENT
Start: 2022-12-28 | End: 2022-12-28 | Stop reason: SDUPTHER

## 2022-12-28 RX ORDER — SODIUM CHLORIDE 0.9 % (FLUSH) 0.9 %
5-40 SYRINGE (ML) INJECTION PRN
Status: DISCONTINUED | OUTPATIENT
Start: 2022-12-28 | End: 2022-12-28 | Stop reason: HOSPADM

## 2022-12-28 RX ORDER — OXYCODONE HYDROCHLORIDE AND ACETAMINOPHEN 5; 325 MG/1; MG/1
1 TABLET ORAL EVERY 6 HOURS PRN
Qty: 15 TABLET | Refills: 0 | Status: SHIPPED | OUTPATIENT
Start: 2022-12-28 | End: 2023-01-04

## 2022-12-28 RX ORDER — SODIUM CHLORIDE 0.9 % (FLUSH) 0.9 %
5-40 SYRINGE (ML) INJECTION EVERY 12 HOURS SCHEDULED
Status: DISCONTINUED | OUTPATIENT
Start: 2022-12-28 | End: 2022-12-28 | Stop reason: HOSPADM

## 2022-12-28 RX ORDER — LIDOCAINE HYDROCHLORIDE 10 MG/ML
INJECTION, SOLUTION INFILTRATION; PERINEURAL PRN
Status: DISCONTINUED | OUTPATIENT
Start: 2022-12-28 | End: 2022-12-28 | Stop reason: ALTCHOICE

## 2022-12-28 RX ORDER — MIDAZOLAM HYDROCHLORIDE 1 MG/ML
INJECTION INTRAMUSCULAR; INTRAVENOUS PRN
Status: DISCONTINUED | OUTPATIENT
Start: 2022-12-28 | End: 2022-12-28 | Stop reason: SDUPTHER

## 2022-12-28 RX ORDER — SODIUM CHLORIDE 9 MG/ML
INJECTION, SOLUTION INTRAVENOUS CONTINUOUS
Status: DISCONTINUED | OUTPATIENT
Start: 2022-12-28 | End: 2022-12-28 | Stop reason: HOSPADM

## 2022-12-28 RX ADMIN — PROPOFOL 70 MG: 10 INJECTION, EMULSION INTRAVENOUS at 08:07

## 2022-12-28 RX ADMIN — FENTANYL CITRATE 100 MCG: 50 INJECTION, SOLUTION INTRAMUSCULAR; INTRAVENOUS at 08:07

## 2022-12-28 RX ADMIN — OXYCODONE HYDROCHLORIDE AND ACETAMINOPHEN 1 TABLET: 5; 325 TABLET ORAL at 08:59

## 2022-12-28 RX ADMIN — Medication 0.5 MG: at 09:22

## 2022-12-28 RX ADMIN — CEFAZOLIN 3000 MG: 10 INJECTION, POWDER, FOR SOLUTION INTRAVENOUS at 08:02

## 2022-12-28 RX ADMIN — PROPOFOL 50 MG: 10 INJECTION, EMULSION INTRAVENOUS at 08:20

## 2022-12-28 RX ADMIN — MIDAZOLAM 2 MG: 1 INJECTION INTRAMUSCULAR; INTRAVENOUS at 08:02

## 2022-12-28 RX ADMIN — HYDROMORPHONE HYDROCHLORIDE 0.5 MG: 1 INJECTION, SOLUTION INTRAMUSCULAR; INTRAVENOUS; SUBCUTANEOUS at 09:22

## 2022-12-28 RX ADMIN — PROPOFOL 50 MCG/KG/MIN: 10 INJECTION, EMULSION INTRAVENOUS at 08:08

## 2022-12-28 RX ADMIN — SODIUM CHLORIDE: 9 INJECTION, SOLUTION INTRAVENOUS at 07:53

## 2022-12-28 ASSESSMENT — PAIN DESCRIPTION - LOCATION
LOCATION: HAND
LOCATION: FINGER (COMMENT WHICH ONE)
LOCATION: HAND

## 2022-12-28 ASSESSMENT — PAIN DESCRIPTION - DESCRIPTORS
DESCRIPTORS: SHARP
DESCRIPTORS: STABBING

## 2022-12-28 ASSESSMENT — PAIN DESCRIPTION - ORIENTATION
ORIENTATION: LEFT

## 2022-12-28 ASSESSMENT — PAIN SCALES - GENERAL
PAINLEVEL_OUTOF10: 6
PAINLEVEL_OUTOF10: 10
PAINLEVEL_OUTOF10: 10

## 2022-12-28 ASSESSMENT — PAIN - FUNCTIONAL ASSESSMENT: PAIN_FUNCTIONAL_ASSESSMENT: 0-10

## 2022-12-28 NOTE — PROGRESS NOTES
CLINICAL PHARMACY NOTE: MEDS TO BEDS    Total # of Prescriptions Filled: 1   The following medications were delivered to the patient:  Percocet 5/325 mg       Additional Documentation:

## 2022-12-28 NOTE — OP NOTE
Operative Note      Patient: Criselda Last  YOB: 1965  MRN: [id number]    Date of Procedure: 12/28/2022    Pre-Op Diagnosis: Carpal tunnel syndrome on left [G56.02]  Trigger finger of left hand, unspecified finger [M65.30]    Post-Op Diagnosis: Same       Procedure(s):  LEFT THUMB TRIGGER  RELEASE LEFT INDEX AND MIDDLE FINGER TRIGGER RELEASE LEFT CARPAL TUNNEL RELEASE    Surgeon(s):  Melani Mayo MD    Assistant:   Physician Assistant: MANJULA Eid  Resident: Carol Beavers DO    Anesthesia: Monitor Anesthesia Care    Estimated Blood Loss (mL): Minimal    Complications: None    Specimens:   * No specimens in log *    Implants:  * No implants in log *      Drains: * No LDAs found *    Findings: see details    Detailed Description of Procedure:   DATE OF PROCEDURE: 12/28/2022  SURGEON: Carol Beavers M.D.  ASSISTANT:   Cassie Hammonds [facility] (She was present throughout the  procedure. She assisted in the usual preoperative positioning, intraoperative  traction, closure, dressing application. Her assistance expedited the case  and decreased surgical time.)  2. Dr. Malissa Linn: Orthopedic surgery resident  PREOPERATIVE DIAGNOSIS:   Left carpal tunnel syndrome. Left thumb trigger  Left index trigger  Left middle trigger    POSTOPERATIVE DIAGNOSIS: Same    OPERATION:   Left carpal tunnel release  Left thumb trigger release  Left index trigger release  Left middle trigger release    ANESTHESIA:  1. Monitored anesthesia care  2. Local anesthetic by surgeon consisting of 10cc of 1% lidocaine with epinephrine  ESTIMATED BLOOD LOSS: Minimal  COMPLICATIONS: None. TOTAL TOURNIQUET TIME: Approximately 19 minutes, 250 mm brachial tourniquet. DISPOSITION: The patient was stable throughout the procedure. FINDINGS:  1. Evidence of median nerve compression beneath the transverse carpal  ligament. It was adequately decompressed with release of the transverse  carpal ligament.   2. Status post decompression, the median nerve was fully decompressed  throughout its course including distal forearm fascia through the carpal  tunnel to its trifurcation distally  OPERATIVE INDICATION: The patient is a very pleasant patient with  persistent and recalcitrant of carpal tunnel syndrome and trigger fingers. After failing  conservative management, she wished to proceed with carpal tunnel release. Risks, benefits, alternatives, and complications were fully explained to her  including, but not limited to, the risk of infection, damage to  nerves/vessels/tendons, failure to relieve her symptoms, worsening symptoms,  recurrence of symptoms, pillar pain, thenar pain, hypothenar pain, scar sensitivity as well as unforeseen complications. She voiced her understanding and wished to proceed. PROCEDURE IN DETAIL: The patient was identified in the holding area. The  left hand was identified as the operative site. She was then seen by  Anesthesia, taken to the operating room, placed supine on the table, and  underwent monitored anesthesia care per Anesthesia Department. Under sterile  conditions, approximately 10 mL of  1% lidocaine  were infiltrated over the surgical sites. With thisaccomplished, a well-padded arm tourniquet was placed. The left upperextremity was prepared and draped in standard sterile fashion. The arm was exsanguinated and the tourniquet was inflated to 250 mmHg. A short transverse incision was made just proximal to the MCP joint flexion crease of the thumb. The radial and ulnar neurovascular bundles were identified and preserved. The A1 pulley was then identified and incised longitudenally. The release was then extended distally to include the proximal extent of the Oblique pulley and proximally to include the distal thenar fascia. There was evidence of stenosis of the underlying flexor tendon under the A1 pulley that were decompressed with release of the A1 pulley.  With proximal retraction of the underlying tendon, there was full and unhindered flexion of the interphalangeal joint. The wound was irrigated with saline and the skin closed with 4-0 Nylon suture. An incision in line with the radial border of the ring finger, extending from  Rincon's cardinal line to within 1 cm of the volar wrist flexion crease was  then created followed by blunt dissection and identification of the  superficial palmar fascia, underlying median nerve, and transverse carpal  ligament. Dissection was performed proximally to identify the contents of  Guyon canal, which was reflected off of the distal forearm fascia and  transverse carpal ligament. The transverse carpal ligament was then clearly  identified and released from a proximal to distal direction, decompressing  the carpal tunnel. The median nerve was inspected. There was flattening and  hyperemia to the nerve, consistent with median nerve compression. The  remaining portion of the proximal transverse carpal ligament and the distal  forearm fascia was then released using blunt-tip Metzenbaum scissors with a  distal to proximal slide technique while visualizing and protecting the  underlying median nerve. The median nerve was fully decompressed  throughout its visualized course, including the distal forearm fascia,  through the carpal tunnel and to its level of trifurcation distally. A short oblique incision was made just proximal to the MCP joint flexion crease of the index finger. The radial and ulnar neurovascular bundles were identified and preserved. The A1 pulley was then identified and incised longitudenally. The release was then extended distally to include the proximal extent of the A2 pulley and proximally to include the distal palmar fascia. There was evidence of stenosis of the underlying flexor tendons under the A1 pulley that were decompressed with release of the A1 pulley.  With proximal retraction of the underlying tendons, there was full and unhindered flexion of the interphalangeal joint. A short oblique incision was made just proximal to the MCP joint flexion crease of the middle finger. The radial and ulnar neurovascular bundles were identified and preserved. The A1 pulley was then identified and incised longitudenally. The release was then extended distally to include the proximal extent of the A2 pulley and proximally to include the distal palmar fascia. There was evidence of stenosis of the underlying flexor tendons under the A1 pulley that were decompressed with release of the A1 pulley. With proximal retraction of the underlying tendons, there was full and unhindered flexion of the interphalangeal joint. With this accomplished, the tourniquet was deflated and hemostasis was achieved with bipolar cautery. The wounds were copiously irrigated out. The skin was closed with nylon suture followed by a soft sterile dressing.       Electronically signed by Jeffrey Aguayo MD on 12/28/2022 at 8:36 AM

## 2022-12-28 NOTE — ANESTHESIA PRE PROCEDURE
Department of Anesthesiology  Preprocedure Note       Name:  Jamil Regan   Age:  59 y.o.  :  1965                                          MRN:  [id number]         Date:  2022      Surgeon: Ethan Medina):  Little Gomez MD    Procedure: Procedure(s):  LEFT THUMB TRIGGER  RELEASE LEFT INDEX AND MIDDLE FINGER TRIGGER RELEASE LEFT CARPAL TUNNEL RELEASE    Medications prior to admission:   Prior to Admission medications    Medication Sig Start Date End Date Taking? Authorizing Provider   vitamin B-12 (CYANOCOBALAMIN) 100 MCG tablet Vitamin B12   qd    Historical Provider, MD   albuterol sulfate HFA (PROVENTIL;VENTOLIN;PROAIR) 108 (90 Base) MCG/ACT inhaler albuterol sulfate HFA 90 mcg/actuation aerosol inhaler   INHALE TWO PUFFS BY MOUTH EVERY 4 HOURS    Historical Provider, MD   albuterol (PROVENTIL) (2.5 MG/3ML) 0.083% nebulizer solution albuterol sulfate 2.5 mg/3 mL (0.083 %) solution for nebulization   INHALE THE CONTENTS OF ONE VIAL (3ML) VIA NEBULIZER 4 TIMES A DAY.     Historical Provider, MD   benzonatate (TESSALON) 100 MG capsule benzonatate 100 mg capsule  Patient not taking: Reported on 2022    Historical Provider, MD   famotidine (PEPCID) 40 MG tablet famotidine 40 mg tablet   TAKE ONE TABLET BY MOUTH EVERY night at bedtime    Historical Provider, MD   vitamin D3 (CHOLECALCIFEROL) 125 MCG (5000 UT) TABS tablet cholecalciferol (vitamin D3) 125 mcg (5,000 unit) tablet   TAKE ONE TABLET BY MOUTH EVERY DAY    Historical Provider, MD   meloxicam (MOBIC) 7.5 MG tablet  19   Historical Provider, MD   metFORMIN (GLUCOPHAGE) 500 MG tablet Take 500 mg by mouth daily (with breakfast)    Historical Provider, MD   atenolol (TENORMIN) 25 MG tablet Take 25 mg by mouth daily    Historical Provider, MD   pantoprazole (PROTONIX) 40 MG tablet take 1 tablet by mouth once daily 11/10/16   Historical Provider, MD   PARoxetine (PAXIL) 10 MG tablet take 1 tablet by mouth once daily 16   Historical Provider, MD   acetaminophen (TYLENOL) 325 MG tablet Take 650 mg by mouth every 4 hours as needed for Pain (For mild pain level 1-3 or for fever > 100.5) 2/5/12   Magalie Tomlin MD       Current medications:    Current Facility-Administered Medications   Medication Dose Route Frequency Provider Last Rate Last Admin    0.9 % sodium chloride infusion   IntraVENous Continuous MANJULA Cantu        sodium chloride flush 0.9 % injection 5-40 mL  5-40 mL IntraVENous 2 times per day MANJULA Cantu        sodium chloride flush 0.9 % injection 5-40 mL  5-40 mL IntraVENous PRN MANJULA Cantu        0.9 % sodium chloride infusion   IntraVENous PRN MANJULA Cantu        ceFAZolin (ANCEF) 3,000 mg in dextrose 5 % 100 mL IVPB  3,000 mg IntraVENous On Call to [address] MANJULA [address]           Allergies: Allergies   Allergen Reactions    Demerol Hcl [Meperidine] Nausea And Vomiting    Norco [Hydrocodone-Acetaminophen] Dizziness or Vertigo       Problem List:    Patient Active Problem List   Diagnosis Code    Chest pain R07.9    Obesity E66.9    Hypokalemia E87.6       Past Medical History:        Diagnosis Date    Diabetes mellitus ([facility] [address]     GERD (gastroesophageal reflux disease)     Hypertension     IBS (irritable bowel syndrome)     Prolonged emergence from general anesthesia     Whooping cough 2020       Past Surgical History:        Procedure Laterality Date    CHOLECYSTECTOMY  11/11/2011    FOOT SURGERY Right 03/2019    plantar    HEEL SPUR SURGERY Left 12/2016    JOINT REPLACEMENT Bilateral 09/2017    Great toe joints    JOINT REPLACEMENT Bilateral 03/2017    both big toes    PARTIAL HYSTERECTOMY (CERVIX NOT REMOVED)         Social History:    Social History     Tobacco Use    Smoking status: Never    Smokeless tobacco: Never   Substance Use Topics    Alcohol use:  No                                Counseling given: Not Answered      Vital Signs (Current):   Vitals:    12/27/22 1255 12/28/22 0625 12/28/22 0633   BP:   (!) 182/93   Pulse:  78    Resp:  20    Temp:  97.4 °F (36.3 °C)    TempSrc:  Temporal    SpO2:  95%    Weight: 295 lb (133.8 kg) 295 lb (133.8 kg)    Height: 5' 7\" (1.702 m) 5' 7\" (1.702 m)                                               BP Readings from Last 3 Encounters:   12/28/22 (!) 182/93   05/02/22 (!) 146/56   04/27/21 (!) 144/79       NPO Status: Time of last liquid consumption: 2200                        Time of last solid consumption: 2200                        Date of last liquid consumption: 12/27/22                        Date of last solid food consumption: 12/27/22    BMI:   Wt Readings from Last 3 Encounters:   12/28/22 295 lb (133.8 kg)   12/19/22 (!) 305 lb (138.3 kg)   11/10/22 295 lb (133.8 kg)     Body mass index is 46.2 kg/m². CBC:   Lab Results   Component Value Date/Time    WBC 7.8 03/06/2021 04:06 PM    RBC 4.70 03/06/2021 04:06 PM    HGB 13.1 03/06/2021 04:06 PM    HCT 43.3 03/06/2021 04:06 PM    MCV 92.1 03/06/2021 04:06 PM    RDW 14.9 03/06/2021 04:06 PM     03/06/2021 04:06 PM       CMP:   Lab Results   Component Value Date/Time     03/06/2021 04:06 PM    K 4.4 03/06/2021 04:06 PM     03/06/2021 04:06 PM    CO2 28 03/06/2021 04:06 PM    BUN 19 03/06/2021 04:06 PM    CREATININE 1.2 03/06/2021 04:06 PM    GFRAA 56 03/06/2021 04:06 PM    LABGLOM 46 03/06/2021 04:06 PM    GLUCOSE 136 03/06/2021 04:06 PM    GLUCOSE 109 05/18/2012 08:44 PM    PROT 7.0 03/06/2021 04:06 PM    CALCIUM 9.1 03/06/2021 04:06 PM    BILITOT 0.3 03/06/2021 04:06 PM    ALKPHOS 115 03/06/2021 04:06 PM    AST 24 03/06/2021 04:06 PM    ALT 26 03/06/2021 04:06 PM       POC Tests: No results for input(s): POCGLU, POCNA, POCK, POCCL, POCBUN, POCHEMO, POCHCT in the last 72 hours.     Coags:   Lab Results   Component Value Date/Time    PROTIME 11.8 05/15/2015 06:17 AM    PROTIME 11.1 05/18/2012 08:44 PM    INR 1.1 05/15/2015 06:17 AM    APTT 30.7 05/18/2012 08:44 PM HCG (If Applicable):   Lab Results   Component Value Date    PREGTESTUR NEGATIVE 05/18/2012        ABGs: No results found for: PHART, PO2ART, CQN8LOU, SPS3FAH, BEART, Z5RSYQHD     Type & Screen (If Applicable):  No results found for: LABABO, LABRH    Drug/Infectious Status (If Applicable):  No results found for: HIV, HEPCAB    COVID-19 Screening (If Applicable): No results found for: COVID19        Anesthesia Evaluation  Patient summary reviewed and Nursing notes reviewed no history of anesthetic complications:   Airway: Mallampati: II  TM distance: >3 FB   Neck ROM: full  Mouth opening: > = 3 FB   Dental:          Pulmonary:Negative Pulmonary ROS and normal exam                               Cardiovascular:  Exercise tolerance: good (>4 METS),   (+) hypertension:,                   Neuro/Psych:   Negative Neuro/Psych ROS              GI/Hepatic/Renal:   (+) GERD: well controlled,           Endo/Other:    (+) DiabetesType II DM, no interval change, , .                 Abdominal:             Vascular: negative vascular ROS. Other Findings:           Anesthesia Plan      MAC     ASA 3       Induction: intravenous. MIPS: Postoperative opioids intended and Prophylactic antiemetics administered. Anesthetic plan and risks discussed with patient and spouse. Plan discussed with CRNA. Wilbur Reilly MD   12/28/2022      Chart reviewed . Patient assessed before induction. I agree with the above note.   LUZ Zuñiga - CRNA      Faisal Velasco MD

## 2022-12-28 NOTE — BRIEF OP NOTE
Brief Postoperative Note      Patient: Lashon Oneil  YOB: 1965  MRN: [id number]    Date of Procedure: 12/28/2022    Pre-Op Diagnosis: Carpal tunnel syndrome on left [G56.02]  Trigger finger of left hand, unspecified finger [M65.30]    Post-Op Diagnosis: Same       Procedure(s):  LEFT THUMB TRIGGER  RELEASE LEFT INDEX AND MIDDLE FINGER TRIGGER RELEASE LEFT CARPAL TUNNEL RELEASE    Surgeon(s):  Israel Mcfarland MD    Assistant:  Physician Assistant: MANJULA Boyer  Resident: Anisha Bah DO    Anesthesia: Monitor Anesthesia Care    Estimated Blood Loss (mL): Minimal    Complications: None    Specimens:   * No specimens in log *    Implants:  * No implants in log *      Drains: * No LDAs found *    Findings: see op note    Electronically signed by Anisha Bah DO on 12/28/2022 at 8:41 AM

## 2022-12-28 NOTE — ANESTHESIA POSTPROCEDURE EVALUATION
Department of Anesthesiology  Postprocedure Note    Patient: Adra Schirmer  MRN: [id number]  YOB: 1965  Date of evaluation: 12/28/2022      Procedure Summary     Date: 12/28/22 Room / Location: [facility] OR 05 / [facility]    Anesthesia Start: 0802 Anesthesia Stop: 5388    Procedure: LEFT THUMB TRIGGER  RELEASE LEFT INDEX AND MIDDLE FINGER TRIGGER RELEASE LEFT CARPAL TUNNEL RELEASE (Left: Hand) Diagnosis:       Carpal tunnel syndrome on left      Trigger finger of left hand, unspecified finger      (Carpal tunnel syndrome on left [G56.02])      (Trigger finger of left hand, unspecified finger [M65.30])    Surgeons: Marlin Giordano MD Responsible Provider: Sudhakar Freeman MD    Anesthesia Type: MAC ASA Status: 3          Anesthesia Type: MAC    Juanjo Phase I: Juanjo Score: 10    Juanjo Phase II:        Anesthesia Post Evaluation    Patient location during evaluation: PACU  Patient participation: complete - patient participated  Level of consciousness: awake  Airway patency: patent  Nausea & Vomiting: no nausea and no vomiting  Complications: no  Cardiovascular status: hemodynamically stable  Respiratory status: acceptable and spontaneous ventilation  Hydration status: euvolemic

## 2023-01-06 ENCOUNTER — OFFICE VISIT (OUTPATIENT)
Dept: ORTHOPEDIC SURGERY | Age: 58
End: 2023-01-06

## 2023-01-06 VITALS — HEIGHT: 67 IN | WEIGHT: 293 LBS | BODY MASS INDEX: 45.99 KG/M2

## 2023-01-06 DIAGNOSIS — R20.0 NUMBNESS AND TINGLING OF BOTH UPPER EXTREMITIES: Primary | ICD-10-CM

## 2023-01-06 DIAGNOSIS — R20.2 NUMBNESS AND TINGLING OF BOTH UPPER EXTREMITIES: Primary | ICD-10-CM

## 2023-01-06 PROCEDURE — 99024 POSTOP FOLLOW-UP VISIT: CPT | Performed by: PHYSICIAN ASSISTANT

## 2023-01-06 NOTE — PROGRESS NOTES
HPI: Patient presents today postop day #9 status post left carpal tunnel release and left thumb, index and middle finger trigger release. Overall she is doing well. Numbness and tingling has almost completely resolved. She does report swelling to her hand. Pain is controlled. Physical Exam: Incision clean dry intact with sutures in place. No erythema or signs of infection. Near full flexion-extension of the fingers. Positive pillar pain. Positive ecchymosis. Median, ulnar, radial nerves intact light touch. Brisk capillary refill. Otherwise neurovascular intact. Assessment: postop day #9 status post left carpal tunnel release and left thumb, index and middle finger trigger release    Plan:   Sutures removed today in the office. Scar management advised. Activity restrictions reviewed with the patient. HEP and ROM exercises demonstrated to the patient. Patient referred to therapy to the left upper extremity. Follow up in 1 month if needed  All questions and concerns answered.

## 2023-01-18 ENCOUNTER — OFFICE VISIT (OUTPATIENT)
Dept: ORTHOPEDIC SURGERY | Age: 58
End: 2023-01-18

## 2023-01-18 VITALS — WEIGHT: 293 LBS | BODY MASS INDEX: 45.99 KG/M2 | HEIGHT: 67 IN

## 2023-01-18 DIAGNOSIS — R22.32 MASS OF HAND, LEFT: Primary | ICD-10-CM

## 2023-01-18 PROCEDURE — 99024 POSTOP FOLLOW-UP VISIT: CPT | Performed by: PHYSICIAN ASSISTANT

## 2023-01-18 NOTE — PROGRESS NOTES
HPI: Patient presents today 3 weeks status post left carpal tunnel release and left thumb, index and middle finger trigger release. Overall she is doing well. Numbness and tingling has almost completely resolved. She is in therapy. She does report a 1 day history of a painful nodule in her palm. Physical Exam: Incisions healing well. No erythema or signs of infection. Full flexion-extension of the fingers. - pillar pain. Painful, palpable, firm small nodule in the palm of the hand in line with the ring finger. There is a small bluish discoloration to the nodule. No erythema or signs of infection. 2+ radial pulses. Fingers are warm and well perfused. Median, ulnar, radial nerves intact light touch. Brisk capillary refill. Otherwise neurovascular intact. Assessment: 3 weeks status post left carpal tunnel release and left thumb, index and middle finger trigger release    Plan:   Discussed findings with the patient. Discussed possibility of a superficial thrombophlebitis to the palm. No signs of infection today. This has been present for 1 day. Recommended antiinflammatories, warm compress, and monitoring. Patient will contact the office if this does not resolve or this gets worse. Follow up in 1 month or sooner if needed. All questions and concerns answered.

## 2023-02-21 NOTE — DISCHARGE INSTRUCTIONS
DISCHARGE INSTRUCTIONS TO HOME FOLLOWING SHOULDER SURGERY      ACTIVITY INSTRUCTIONS:    Avoid arm movements to affected side. Wear sling and use pillow at all times      WOUND/DRESSING INSTRUCTIONS:  Always ensure you and your care giver clean hands before and after caring for the wound. May remove dressings in 24 hours and replace with Band Aids   ICE extremity to help reduce swelling. Ice to operative/injured site for 15-30 minutes of each hour for next 3-5 days    OK to shower if wound clean and dry. Do not soak surgical area. Reapply new clean dressing/BandAid. MEDICATION INSTRUCTIONS:  Take pain medicine as directed. When taking pain medications, you may experience dizziness or drowsiness. Do not drink alcohol or drive when taking these medications. Do not take any other medication containing acetaminophen (Tylenol) while taking the prescribed pain medication. Ibuprofen, Aleve, Motrin, or Naproxen are okay but should not be taken on an empty        stomach. *Watch for these significant complications:  Call Physician if these or any other problems occur:  Fever over 101°, redness, swelling or warmth at the operative site  Unrelieved nausea   Breathing issues such as shortness of breath or difficulty breathing    Foul smelling or cloudy drainage at the operative site   Unrelieved pain    Blood soaked dressing. (Some oozing may be normal)     Numb, pale, blue, cold or tingling extremity      Make an appointment to be seen 8-10 days after surgery  FOLLOW-UP CARE:    Dr. General Dwyer.  Tree [facility]er [address] [place] [address]  [phone]

## 2023-02-27 NOTE — PROGRESS NOTES
[facility] PRE-ADMISSION TESTING INSTRUCTIONS    The Preadmission Testing patient is instructed accordingly using the following criteria (check applicable):    ARRIVAL INSTRUCTIONS:  [x] Parking the day of Surgery is located in the Main Entrance lot. Upon entering the door, make an immediate right to the surgery reception desk    [x] Bring photo ID and insurance card    [] Bring in a copy of Living will or Durable Power of  papers. [x] Please be sure to arrange for responsible adult to provide transportation to and from the hospital    [x] Please arrange for responsible adult to be with you for the 24 hour period post procedure due to having anesthesia    [x] If you awake am of surgery not feeling well or have temperature >100 please call [phone]    GENERAL INSTRUCTIONS:    [x] Nothing by mouth after midnight, including gum, candy, mints or water    [x] You may brush your teeth, but do not swallow any water    [x] Take medications as instructed with 1-2 oz of water    [x] Stop herbal supplements and vitamins 5 days prior to procedure    [] Follow preop dosing of blood thinners per physician instructions    [] Take 1/2 dose of evening insulin, but no insulin after midnight    [x] No oral diabetic medications after midnight    [x] If diabetic and have low blood sugar or feel symptomatic, take 1-2oz apple juice only    [x] Bring inhalers day of surgery    [] Bring C-PAP/ Bi-Pap day of surgery    [] Bring urine specimen day of surgery    [x] Shower or bath with soap, lather and rinse well, AM of Surgery, no lotion, powders or creams to surgical site    [] Follow bowel prep as instructed per surgeon    [x] No tobacco products within 24 hours of surgery     [x] No alcohol or illegal drug use within 24 hours of surgery.     [x] Jewelry, body piercing's, eyeglasses, contact lenses and dentures are not permitted into surgery (bring cases)      [x] Please do not wear any nail polish, make up or hair products on the day of surgery    [x] You can expect a call the business day prior to procedure to notify you if your arrival time changes    [x] If you receive a survey after surgery we would greatly appreciate your comments    [] Parent/guardian of a minor must accompany their child and remain on the premises  the entire time they are under our care     [] Pediatric patients may bring favorite toy, blanket or comfort item with them    [] A caregiver or family member must remain with the patient during their stay if they are mentally handicapped, have dementia, disoriented or unable to use a call light or would be a safety concern if left unattended    [x] Please notify surgeon if you develop any illness between now and time of surgery (cold, cough, sore throat, fever, nausea, vomiting) or any signs of infections  including skin, wounds, and dental.    [x]  The Outpatient Pharmacy is available to fill your prescription here on your day of surgery, ask your preop nurse for details    [] Other instructions    EDUCATIONAL MATERIALS PROVIDED:    [] PAT Preoperative Education Packet/Booklet     [] Medication List    [] Transfusion bracelet applied with instructions    [] Shower with soap, lather and rinse well, and use CHG wipes provided the evening before surgery as instructed    [] Incentive spirometer with instructions

## 2023-02-28 ENCOUNTER — ANESTHESIA EVENT (OUTPATIENT)
Dept: OPERATING ROOM | Age: 58
End: 2023-02-28
Payer: COMMERCIAL

## 2023-03-01 ENCOUNTER — ANESTHESIA (OUTPATIENT)
Dept: OPERATING ROOM | Age: 58
End: 2023-03-01
Payer: COMMERCIAL

## 2023-03-01 ENCOUNTER — HOSPITAL ENCOUNTER (OUTPATIENT)
Age: 58
Setting detail: OUTPATIENT SURGERY
Discharge: HOME | End: 2023-03-01
Attending: ORTHOPAEDIC SURGERY | Admitting: ORTHOPAEDIC SURGERY
Payer: COMMERCIAL

## 2023-03-01 VITALS
OXYGEN SATURATION: 94 % | BODY MASS INDEX: 45.52 KG/M2 | TEMPERATURE: 97.5 F | HEART RATE: 77 BPM | RESPIRATION RATE: 18 BRPM | WEIGHT: 290 LBS | SYSTOLIC BLOOD PRESSURE: 149 MMHG | HEIGHT: 67 IN | DIASTOLIC BLOOD PRESSURE: 81 MMHG

## 2023-03-01 DIAGNOSIS — G89.18 POST-OPERATIVE PAIN: Primary | ICD-10-CM

## 2023-03-01 LAB
ANION GAP SERPL CALCULATED.3IONS-SCNC: 9 MMOL/L (ref 7–16)
BUN BLDV-MCNC: 17 MG/DL (ref 6–23)
CALCIUM SERPL-MCNC: 9.3 MG/DL (ref 8.6–10.2)
CHLORIDE BLD-SCNC: 101 MMOL/L (ref 98–107)
CO2: 28 MMOL/L (ref 22–29)
CREAT SERPL-MCNC: 0.9 MG/DL (ref 0.5–1)
GFR SERPL CREATININE-BSD FRML MDRD: >60 ML/MIN/1.73
GLUCOSE BLD-MCNC: 166 MG/DL (ref 74–99)
HCT VFR BLD CALC: 41.5 % (ref 34–48)
HEMOGLOBIN: 12.9 G/DL (ref 11.5–15.5)
MCH RBC QN AUTO: 29.3 PG (ref 26–35)
MCHC RBC AUTO-ENTMCNC: 31.1 % (ref 32–34.5)
MCV RBC AUTO: 94.3 FL (ref 80–99.9)
METER GLUCOSE: 225 MG/DL (ref 74–99)
PDW BLD-RTO: 16.2 FL (ref 11.5–15)
PLATELET # BLD: 247 E9/L (ref 130–450)
PMV BLD AUTO: 10.5 FL (ref 7–12)
POTASSIUM SERPL-SCNC: 4.2 MMOL/L (ref 3.5–5)
RBC # BLD: 4.4 E12/L (ref 3.5–5.5)
SODIUM BLD-SCNC: 138 MMOL/L (ref 132–146)
WBC # BLD: 8.7 E9/L (ref 4.5–11.5)

## 2023-03-01 PROCEDURE — 64415 NJX AA&/STRD BRCH PLXS IMG: CPT | Performed by: ANESTHESIOLOGY

## 2023-03-01 PROCEDURE — 6360000002 HC RX W HCPCS: Performed by: ORTHOPAEDIC SURGERY

## 2023-03-01 PROCEDURE — 7100000010 HC PHASE II RECOVERY - FIRST 15 MIN: Performed by: ORTHOPAEDIC SURGERY

## 2023-03-01 PROCEDURE — 6360000002 HC RX W HCPCS: Performed by: PHYSICIAN ASSISTANT

## 2023-03-01 PROCEDURE — 3700000000 HC ANESTHESIA ATTENDED CARE: Performed by: ORTHOPAEDIC SURGERY

## 2023-03-01 PROCEDURE — 3600000004 HC SURGERY LEVEL 4 BASE: Performed by: ORTHOPAEDIC SURGERY

## 2023-03-01 PROCEDURE — 2500000003 HC RX 250 WO HCPCS: Performed by: NURSE ANESTHETIST, CERTIFIED REGISTERED

## 2023-03-01 PROCEDURE — 36415 COLL VENOUS BLD VENIPUNCTURE: CPT

## 2023-03-01 PROCEDURE — 2580000003 HC RX 258: Performed by: PHYSICIAN ASSISTANT

## 2023-03-01 PROCEDURE — 7100000011 HC PHASE II RECOVERY - ADDTL 15 MIN: Performed by: ORTHOPAEDIC SURGERY

## 2023-03-01 PROCEDURE — 7100000001 HC PACU RECOVERY - ADDTL 15 MIN: Performed by: ORTHOPAEDIC SURGERY

## 2023-03-01 PROCEDURE — 29827 SHO ARTHRS SRG RT8TR CUF RPR: CPT | Performed by: ORTHOPAEDIC SURGERY

## 2023-03-01 PROCEDURE — 2709999900 HC NON-CHARGEABLE SUPPLY: Performed by: ORTHOPAEDIC SURGERY

## 2023-03-01 PROCEDURE — 6370000000 HC RX 637 (ALT 250 FOR IP): Performed by: ANESTHESIOLOGY

## 2023-03-01 PROCEDURE — 3700000001 HC ADD 15 MINUTES (ANESTHESIA): Performed by: ORTHOPAEDIC SURGERY

## 2023-03-01 PROCEDURE — C1713 ANCHOR/SCREW BN/BN,TIS/BN: HCPCS | Performed by: ORTHOPAEDIC SURGERY

## 2023-03-01 PROCEDURE — 3600000014 HC SURGERY LEVEL 4 ADDTL 15MIN: Performed by: ORTHOPAEDIC SURGERY

## 2023-03-01 PROCEDURE — 6360000002 HC RX W HCPCS: Performed by: NURSE ANESTHETIST, CERTIFIED REGISTERED

## 2023-03-01 PROCEDURE — 29823 SHO ARTHRS SRG XTNSV DBRDMT: CPT | Performed by: ORTHOPAEDIC SURGERY

## 2023-03-01 PROCEDURE — 85027 COMPLETE CBC AUTOMATED: CPT

## 2023-03-01 PROCEDURE — 82962 GLUCOSE BLOOD TEST: CPT

## 2023-03-01 PROCEDURE — 6360000002 HC RX W HCPCS: Performed by: ANESTHESIOLOGY

## 2023-03-01 PROCEDURE — 29826 SHO ARTHRS SRG DECOMPRESSION: CPT | Performed by: ORTHOPAEDIC SURGERY

## 2023-03-01 PROCEDURE — 80048 BASIC METABOLIC PNL TOTAL CA: CPT

## 2023-03-01 PROCEDURE — L3650 SO 8 ABD RESTRAINT PRE OTS: HCPCS | Performed by: ORTHOPAEDIC SURGERY

## 2023-03-01 PROCEDURE — 7100000000 HC PACU RECOVERY - FIRST 15 MIN: Performed by: ORTHOPAEDIC SURGERY

## 2023-03-01 PROCEDURE — C1776 JOINT DEVICE (IMPLANTABLE): HCPCS | Performed by: ORTHOPAEDIC SURGERY

## 2023-03-01 PROCEDURE — 2720000010 HC SURG SUPPLY STERILE: Performed by: ORTHOPAEDIC SURGERY

## 2023-03-01 PROCEDURE — 94664 DEMO&/EVAL PT USE INHALER: CPT

## 2023-03-01 DEVICE — 4.75MM BC KNOTLESS SWIVELOCK
Type: IMPLANTABLE DEVICE | Site: SHOULDER | Status: FUNCTIONAL
Brand: ARTHREX®

## 2023-03-01 DEVICE — BIO-COMP, SWIVELOCK C, TT, 4.75X19.1MM
Type: IMPLANTABLE DEVICE | Site: SHOULDER | Status: FUNCTIONAL
Brand: ARTHREX®

## 2023-03-01 DEVICE — BIO-COMP, SWIVELOCK C, FT, 4.75X19.1MM
Type: IMPLANTABLE DEVICE | Site: SHOULDER | Status: FUNCTIONAL
Brand: ARTHREX®

## 2023-03-01 RX ORDER — SODIUM CHLORIDE 0.9 % (FLUSH) 0.9 %
5-40 SYRINGE (ML) INJECTION EVERY 12 HOURS SCHEDULED
Status: DISCONTINUED | OUTPATIENT
Start: 2023-03-01 | End: 2023-03-01 | Stop reason: HOSPADM

## 2023-03-01 RX ORDER — ROPIVACAINE HYDROCHLORIDE 5 MG/ML
30 INJECTION, SOLUTION EPIDURAL; INFILTRATION; PERINEURAL ONCE
Status: COMPLETED | OUTPATIENT
Start: 2023-03-01 | End: 2023-03-01

## 2023-03-01 RX ORDER — SODIUM CHLORIDE 9 MG/ML
INJECTION, SOLUTION INTRAVENOUS PRN
Status: DISCONTINUED | OUTPATIENT
Start: 2023-03-01 | End: 2023-03-01 | Stop reason: HOSPADM

## 2023-03-01 RX ORDER — ONDANSETRON 2 MG/ML
INJECTION INTRAMUSCULAR; INTRAVENOUS PRN
Status: DISCONTINUED | OUTPATIENT
Start: 2023-03-01 | End: 2023-03-01 | Stop reason: SDUPTHER

## 2023-03-01 RX ORDER — SODIUM CHLORIDE 0.9 % (FLUSH) 0.9 %
5-40 SYRINGE (ML) INJECTION PRN
Status: DISCONTINUED | OUTPATIENT
Start: 2023-03-01 | End: 2023-03-01 | Stop reason: HOSPADM

## 2023-03-01 RX ORDER — SUCCINYLCHOLINE/SOD CL,ISO/PF 200MG/10ML
SYRINGE (ML) INTRAVENOUS PRN
Status: DISCONTINUED | OUTPATIENT
Start: 2023-03-01 | End: 2023-03-01 | Stop reason: SDUPTHER

## 2023-03-01 RX ORDER — GLYCOPYRROLATE 0.2 MG/ML
INJECTION INTRAMUSCULAR; INTRAVENOUS PRN
Status: DISCONTINUED | OUTPATIENT
Start: 2023-03-01 | End: 2023-03-01 | Stop reason: SDUPTHER

## 2023-03-01 RX ORDER — ONDANSETRON 2 MG/ML
4 INJECTION INTRAMUSCULAR; INTRAVENOUS
Status: COMPLETED | OUTPATIENT
Start: 2023-03-01 | End: 2023-03-01

## 2023-03-01 RX ORDER — ROPIVACAINE HYDROCHLORIDE 5 MG/ML
INJECTION, SOLUTION EPIDURAL; INFILTRATION; PERINEURAL
Status: DISCONTINUED
Start: 2023-03-01 | End: 2023-03-01 | Stop reason: HOSPADM

## 2023-03-01 RX ORDER — FENTANYL CITRATE 50 UG/ML
50 INJECTION, SOLUTION INTRAMUSCULAR; INTRAVENOUS EVERY 10 MIN PRN
Status: DISCONTINUED | OUTPATIENT
Start: 2023-03-01 | End: 2023-03-01 | Stop reason: HOSPADM

## 2023-03-01 RX ORDER — FENTANYL CITRATE 50 UG/ML
INJECTION, SOLUTION INTRAMUSCULAR; INTRAVENOUS
Status: COMPLETED
Start: 2023-03-01 | End: 2023-03-01

## 2023-03-01 RX ORDER — SODIUM CHLORIDE 9 MG/ML
INJECTION, SOLUTION INTRAVENOUS CONTINUOUS
Status: DISCONTINUED | OUTPATIENT
Start: 2023-03-01 | End: 2023-03-01 | Stop reason: HOSPADM

## 2023-03-01 RX ORDER — EPINEPHRINE 1 MG/ML
INJECTION, SOLUTION, CONCENTRATE INTRAVENOUS PRN
Status: DISCONTINUED | OUTPATIENT
Start: 2023-03-01 | End: 2023-03-01 | Stop reason: ALTCHOICE

## 2023-03-01 RX ORDER — LIDOCAINE HYDROCHLORIDE 20 MG/ML
INJECTION, SOLUTION EPIDURAL; INFILTRATION; INTRACAUDAL; PERINEURAL PRN
Status: DISCONTINUED | OUTPATIENT
Start: 2023-03-01 | End: 2023-03-01 | Stop reason: SDUPTHER

## 2023-03-01 RX ORDER — MIDAZOLAM HYDROCHLORIDE 1 MG/ML
INJECTION INTRAMUSCULAR; INTRAVENOUS
Status: DISCONTINUED
Start: 2023-03-01 | End: 2023-03-01 | Stop reason: HOSPADM

## 2023-03-01 RX ORDER — OXYCODONE HYDROCHLORIDE AND ACETAMINOPHEN 5; 325 MG/1; MG/1
1 TABLET ORAL EVERY 6 HOURS PRN
Qty: 28 TABLET | Refills: 0 | Status: SHIPPED | OUTPATIENT
Start: 2023-03-01 | End: 2023-03-09 | Stop reason: SDUPTHER

## 2023-03-01 RX ORDER — OXYCODONE HYDROCHLORIDE AND ACETAMINOPHEN 5; 325 MG/1; MG/1
1 TABLET ORAL
Status: COMPLETED | OUTPATIENT
Start: 2023-03-01 | End: 2023-03-01

## 2023-03-01 RX ORDER — MIDAZOLAM HYDROCHLORIDE 2 MG/2ML
1 INJECTION, SOLUTION INTRAMUSCULAR; INTRAVENOUS PRN
Status: DISCONTINUED | OUTPATIENT
Start: 2023-03-01 | End: 2023-03-01 | Stop reason: HOSPADM

## 2023-03-01 RX ORDER — ROCURONIUM BROMIDE 10 MG/ML
INJECTION, SOLUTION INTRAVENOUS PRN
Status: DISCONTINUED | OUTPATIENT
Start: 2023-03-01 | End: 2023-03-01 | Stop reason: SDUPTHER

## 2023-03-01 RX ORDER — FENTANYL CITRATE 50 UG/ML
INJECTION, SOLUTION INTRAMUSCULAR; INTRAVENOUS PRN
Status: DISCONTINUED | OUTPATIENT
Start: 2023-03-01 | End: 2023-03-01 | Stop reason: SDUPTHER

## 2023-03-01 RX ORDER — PROPOFOL 10 MG/ML
INJECTION, EMULSION INTRAVENOUS PRN
Status: DISCONTINUED | OUTPATIENT
Start: 2023-03-01 | End: 2023-03-01 | Stop reason: SDUPTHER

## 2023-03-01 RX ORDER — ALBUTEROL SULFATE 2.5 MG/3ML
2.5 SOLUTION RESPIRATORY (INHALATION) ONCE
Status: COMPLETED | OUTPATIENT
Start: 2023-03-01 | End: 2023-03-01

## 2023-03-01 RX ORDER — DIMETHICONE, OXYBENZONE, AND PADIMATE O 2; 2.5; 6.6 G/100G; G/100G; G/100G
STICK TOPICAL
Status: DISCONTINUED
Start: 2023-03-01 | End: 2023-03-01 | Stop reason: HOSPADM

## 2023-03-01 RX ORDER — EPHEDRINE SULFATE/0.9% NACL/PF 50 MG/5 ML
SYRINGE (ML) INTRAVENOUS PRN
Status: DISCONTINUED | OUTPATIENT
Start: 2023-03-01 | End: 2023-03-01 | Stop reason: SDUPTHER

## 2023-03-01 RX ORDER — DEXAMETHASONE SODIUM PHOSPHATE 4 MG/ML
INJECTION, SOLUTION INTRA-ARTICULAR; INTRALESIONAL; INTRAMUSCULAR; INTRAVENOUS; SOFT TISSUE PRN
Status: DISCONTINUED | OUTPATIENT
Start: 2023-03-01 | End: 2023-03-01 | Stop reason: SDUPTHER

## 2023-03-01 RX ORDER — NEOSTIGMINE METHYLSULFATE 1 MG/ML
INJECTION, SOLUTION INTRAVENOUS PRN
Status: DISCONTINUED | OUTPATIENT
Start: 2023-03-01 | End: 2023-03-01 | Stop reason: SDUPTHER

## 2023-03-01 RX ADMIN — LIDOCAINE HYDROCHLORIDE 50 MG: 20 INJECTION, SOLUTION EPIDURAL; INFILTRATION; INTRACAUDAL; PERINEURAL at 08:22

## 2023-03-01 RX ADMIN — GLYCOPYRROLATE 0.6 MG: 0.2 INJECTION, SOLUTION INTRAMUSCULAR; INTRAVENOUS at 09:49

## 2023-03-01 RX ADMIN — PROPOFOL 200 MG: 10 INJECTION, EMULSION INTRAVENOUS at 08:22

## 2023-03-01 RX ADMIN — MIDAZOLAM HYDROCHLORIDE 2 MG: 1 INJECTION, SOLUTION INTRAMUSCULAR; INTRAVENOUS at 07:55

## 2023-03-01 RX ADMIN — ROCURONIUM BROMIDE 10 MG: 10 INJECTION, SOLUTION INTRAVENOUS at 08:22

## 2023-03-01 RX ADMIN — OXYCODONE AND ACETAMINOPHEN 1 TABLET: 5; 325 TABLET ORAL at 13:11

## 2023-03-01 RX ADMIN — ONDANSETRON 4 MG: 2 INJECTION INTRAMUSCULAR; INTRAVENOUS at 08:28

## 2023-03-01 RX ADMIN — HYDROMORPHONE HYDROCHLORIDE 0.25 MG: 0.5 INJECTION, SOLUTION INTRAMUSCULAR; INTRAVENOUS; SUBCUTANEOUS at 11:05

## 2023-03-01 RX ADMIN — Medication 3 MG: at 09:49

## 2023-03-01 RX ADMIN — WATER 2000 MG: 1 INJECTION INTRAMUSCULAR; INTRAVENOUS; SUBCUTANEOUS at 08:28

## 2023-03-01 RX ADMIN — SODIUM CHLORIDE: 9 INJECTION, SOLUTION INTRAVENOUS at 08:13

## 2023-03-01 RX ADMIN — FENTANYL CITRATE 50 MCG: 50 INJECTION, SOLUTION INTRAMUSCULAR; INTRAVENOUS at 08:22

## 2023-03-01 RX ADMIN — ROPIVACAINE HYDROCHLORIDE 30 ML: 5 INJECTION EPIDURAL; INFILTRATION; PERINEURAL at 08:03

## 2023-03-01 RX ADMIN — DEXAMETHASONE SODIUM PHOSPHATE 8 MG: 4 INJECTION, SOLUTION INTRAMUSCULAR; INTRAVENOUS at 08:28

## 2023-03-01 RX ADMIN — Medication 180 MG: at 08:22

## 2023-03-01 RX ADMIN — FENTANYL CITRATE 100 MCG: 50 INJECTION, SOLUTION INTRAMUSCULAR; INTRAVENOUS at 07:55

## 2023-03-01 RX ADMIN — ALBUTEROL SULFATE 2.5 MG: 2.5 SOLUTION RESPIRATORY (INHALATION) at 10:39

## 2023-03-01 RX ADMIN — WATER 1000 MG: 1 INJECTION INTRAMUSCULAR; INTRAVENOUS; SUBCUTANEOUS at 08:54

## 2023-03-01 RX ADMIN — ROCURONIUM BROMIDE 20 MG: 10 INJECTION, SOLUTION INTRAVENOUS at 08:34

## 2023-03-01 RX ADMIN — HYDROMORPHONE HYDROCHLORIDE 0.25 MG: 0.5 INJECTION, SOLUTION INTRAMUSCULAR; INTRAVENOUS; SUBCUTANEOUS at 11:18

## 2023-03-01 RX ADMIN — ONDANSETRON 4 MG: 2 INJECTION INTRAMUSCULAR; INTRAVENOUS at 11:16

## 2023-03-01 RX ADMIN — Medication 10 MG: at 09:12

## 2023-03-01 ASSESSMENT — PAIN DESCRIPTION - ORIENTATION
ORIENTATION: RIGHT

## 2023-03-01 ASSESSMENT — PAIN DESCRIPTION - PAIN TYPE
TYPE: SURGICAL PAIN
TYPE: SURGICAL PAIN

## 2023-03-01 ASSESSMENT — PAIN DESCRIPTION - LOCATION
LOCATION: SHOULDER

## 2023-03-01 ASSESSMENT — PAIN - FUNCTIONAL ASSESSMENT
PAIN_FUNCTIONAL_ASSESSMENT: PREVENTS OR INTERFERES WITH ALL ACTIVE AND SOME PASSIVE ACTIVITIES
PAIN_FUNCTIONAL_ASSESSMENT: PREVENTS OR INTERFERES SOME ACTIVE ACTIVITIES AND ADLS
PAIN_FUNCTIONAL_ASSESSMENT: PREVENTS OR INTERFERES SOME ACTIVE ACTIVITIES AND ADLS

## 2023-03-01 ASSESSMENT — PAIN DESCRIPTION - DESCRIPTORS
DESCRIPTORS: DISCOMFORT
DESCRIPTORS: THROBBING
DESCRIPTORS: THROBBING

## 2023-03-01 ASSESSMENT — PAIN DESCRIPTION - ONSET
ONSET: GRADUAL
ONSET: ON-GOING
ONSET: ON-GOING

## 2023-03-01 ASSESSMENT — PAIN SCALES - GENERAL
PAINLEVEL_OUTOF10: 0
PAINLEVEL_OUTOF10: 6
PAINLEVEL_OUTOF10: 0
PAINLEVEL_OUTOF10: 6
PAINLEVEL_OUTOF10: 3
PAINLEVEL_OUTOF10: 6

## 2023-03-01 ASSESSMENT — PAIN DESCRIPTION - FREQUENCY
FREQUENCY: CONTINUOUS
FREQUENCY: CONTINUOUS

## 2023-03-01 ASSESSMENT — PAIN DESCRIPTION - DIRECTION
RADIATING_TOWARDS: R ARMPIT
RADIATING_TOWARDS: R SHOULDER
RADIATING_TOWARDS: R SHOULDER

## 2023-03-01 NOTE — ANESTHESIA POSTPROCEDURE EVALUATION
Department of Anesthesiology  Postprocedure Note    Patient: Bibi Brizuela  MRN: [id number]  YOB: 1965  Date of evaluation: 3/1/2023      Procedure Summary     Date: 03/01/23 Room / Location: [facility] OR 03 / [facility]    Anesthesia Start: 3780 Anesthesia Stop: 1489    Procedure: RIGHT SHOULDER ARTHROSCOPY WITH DECOMPRESSION ROTATOR CUFF REPAIR POSSIBLE BICEPS TENODESIS (Right: Shoulder) Diagnosis:       Strain of tendon of right rotator cuff, sequela      (Strain of tendon of right rotator cuff, sequela [S46.011S])    Surgeons: Jessi Lopez MD Responsible Provider: Ree Patterson DO    Anesthesia Type: general, regional ASA Status: 3          Anesthesia Type: No value filed.     Juanjo Phase I: Juanjo Score: 10    Juanjo Phase II: Juanjo Score: 10      Anesthesia Post Evaluation    Patient location during evaluation: PACU  Patient participation: complete - patient participated  Level of consciousness: awake and alert  Pain score: 1  Airway patency: patent  Nausea & Vomiting: no nausea and no vomiting  Complications: no  Cardiovascular status: hemodynamically stable  Respiratory status: acceptable  Hydration status: euvolemic

## 2023-03-01 NOTE — BRIEF OP NOTE
Brief Postoperative Note      Patient: Mere Johnson  YOB: 1965  MRN: [id number]    Date of Procedure: 3/1/2023    Pre-Op Diagnosis: Strain of tendon of right rotator cuff, sequela [S46.011S]    Post-Op Diagnosis: Same       Procedure(s):  RIGHT SHOULDER ARTHROSCOPY WITH DECOMPRESSION ROTATOR CUFF REPAIR POSSIBLE BICEPS TENODESIS ++ARTHREX++  ++ISB++    Surgeon(s):  Naila Curiel MD    Assistant:  Physician Assistant: MANJULA Caraballo  Resident: Luanne King DO    Anesthesia: General    Estimated Blood Loss (mL): Minimal    Complications: None    Specimens:   * No specimens in log *    Implants:  Implant Name Type Inv. Item Serial No.  Lot No. LRB No. Used Action   ANCHOR SUT L19.1MM DIA4. 75MM BIOCOMPOSITE FULL THRD - [id number]  ANCHOR SUT L19.1MM DIA4. 75MM BIOCOMPOSITE FULL THRD  [employer] [id number] Right 1 Implanted   ANCHOR SUTURE 4.75X19.1 MM TIG TAPE LOOP WHT BLK PUSHLOCK - [id number]  ANCHOR SUTURE 4.75X19.1 MM TIG TAPE LOOP WHT BLK PUSHLOCK  [employer] [id number] Right 1 Implanted   ANCHOR BIOCOMPOSITE KNOTLESS SL  4.75X19.1MM W/#2 BL SUTURE - [id number]  ANCHOR BIOCOMPOSITE KNOTLESS SL  4.75X19.1MM W/#2 BL SUTURE  [employer] [id number] Right 1 Implanted   ANCHOR BIOCOMPOSITE KNOTLESS SL  4.75X19.1MM W/#2 BL SUTURE - [id number]  ANCHOR BIOCOMPOSITE KNOTLESS SL  4.75X19.1MM W/#2 BL SUTURE  [employer] [id number] Right 1 Implanted         Drains: * No LDAs found *    Findings: see op note    Electronically signed by MANJULA Caraballo on 3/1/2023 at 10:12 AM

## 2023-03-01 NOTE — OP NOTE
Operative Note      Patient: Steven Nicholas  YOB: 1965  MRN: [id number]    Date of Procedure: 3/1/2023    Pre-Op Diagnosis: Strain of tendon of right rotator cuff, sequela [S46.011S]    Post-Op Diagnosis: Same       Procedure(s):  RIGHT SHOULDER ARTHROSCOPY WITH DECOMPRESSION ROTATOR CUFF REPAIR POSSIBLE BICEPS TENODESIS    Surgeon(s):  Alicia Grewal MD    Assistant:   Physician Assistant: MANJULA Bailey  Resident: Althea Miller DO    Anesthesia: General    Estimated Blood Loss (mL): Minimal    Complications: None    Specimens:   * No specimens in log *    Implants:  Implant Name Type Inv. Item Serial No.  Lot No. LRB No. Used Action   ANCHOR SUT L19.1MM DIA4. 75MM BIOCOMPOSITE FULL THRD - [id number]  ANCHOR SUT L19.1MM DIA4. 75MM BIOCOMPOSITE FULL THRD  [employer] [id number] Right 1 Implanted   ANCHOR SUTURE 4.75X19.1 MM TIG TAPE LOOP WHT BLK PUSHLOCK - [id number]  ANCHOR SUTURE 4.75X19.1 MM TIG TAPE LOOP WHT BLK PUSHLOCK  [employer] [id number] Right 1 Implanted   ANCHOR BIOCOMPOSITE KNOTLESS SL  4.75X19.1MM W/#2 BL SUTURE - [id number]  ANCHOR BIOCOMPOSITE KNOTLESS SL  4.75X19.1MM W/#2 BL SUTURE  [employer] [id number] Right 1 Implanted   ANCHOR BIOCOMPOSITE KNOTLESS SL  4.75X19.1MM W/#2 BL SUTURE - [id number]  ANCHOR BIOCOMPOSITE KNOTLESS SL  4.75X19.1MM W/#2 BL SUTURE  [employer] [id number] Right 1 Implanted         Drains: * No LDAs found *    Findings: see details    1/22/2019      PREOPERATIVE DIAGNOSES:  1. Right shoulder impingement syndrome. 2.   Rightshoulder biceps tendinitis. 3.   Right shoulder rotator cuff tear     POSTOPERATIVE DIAGNOSES:  1. Right shoulder impingement syndrome. 2.  Right shoulder biceps tendinitis. 3.  Right shoulder rotator cuff tear     PROCEDURES PERFORMED:  1. Right shoulder arthroscopy with debridement of glenohumeral joint  synovitis and degenerative labral tearing. 2.   Right arthroscopic biceps tenodesis  3.    Right shoulder arthroscopic subacromial decompression. 4.   Right shoulder arthroscopic rotator cuff using the [employer] SpeedBridge  System. ANESTHESIA:  1. General and ISS block  2. Local anesthetic by surgeon consisting approximately 10 mL of 0.25%  Marcaine with epinephrine. ASSISTANTS:  1. Thais Sorensen, physician assistant certified. She was present throughout  the procedure. Her assistance was used for preoperative positioning,  intraoperative retraction, closure, and dressing application. Her  assistance expedited the case and decreased the surgical time. 2.  Dr Karishma Reid, orthopedic surgery resident. FINDINGS:  1. Shoulder arthroscopy revealed synovitis of the anterior aspect of the  shoulder joint with degenerative labral tearing and biceps tendinitis. 2.  There was  full-thickness tear to the rotator cuff,  Involving the entire supraspinatus and anterior infraspinatus. This was repaired using the [facility]. COMPLICATIONS:  None. DISPOSITION:  The patient was stable throughout the procedure. OPERATIVE INDICATIONS:  The patient presents  with persistent recalcitrant left shoulder pain. After failing  conservative management, they wished to proceed with surgical intervention. Risks, benefits, alternatives, and complications were fully outlined and  explained to her including, but not limited to the risk of infection,  damage to nerves, vessels or tendons, failure to improve symptoms or  worsening symptoms, shoulder stiffness, need for compliance, postoperative  recommendations and skilled therapy, possible need for further surgery. they understood and wished to proceed. SURGERY IN DETAIL:  The patient was identified in the holding area. The  Right shoulder was identified as the surgical site. The areas of maximal  tenderness were outlined, and rotator cuff weakness was confirmed. With  this achieved, she was seen by Anesthesia and a ISS block placed.  She was then taken to the operating room, placed supine on the table, and underwent general anesthesia per the  Anesthesia Department. She was then placed on well-padded right side down,  left side up lateral decubitus position. All bony prominences were well  padded, and the left shoulder was prepared and draped in the standard  sterile fashion. Arm was placed in approximately 10 to 15 pounds of  traction. Posterior portal was localized with a spinal needle, nick  incision, and insertion of a standard arthroscopic trocar. Glenohumeral  joint was inspected. There was synovitis, particularly at the anterior  aspect of the shoulder joint involving the anterior labrum from anterior  inferior to superior as well as degenerative changes along the long head of  the biceps tendon at its insertion in the superior aspect of the glenoid. An anterior portal was then localized with a spinal needle, nick incision,  disposable cannula inserted. Arthroscopic shaver was then used to debride  the glenohumeral joint synovitis along the anterior capsule from anterior  inferior to superior and from anterior to posterior. The biceps was tagged with a fiberwire suture and  tenotomy was completed with a tenotomy scissors followed by debridement of  the labrum from anterior to posterior to stable margins. The anterior  surface of the rotator cuff was then inspected. There was a large tear present. The scope was then placed in the subacromial space. Large amount of subacromial synovitis was present. The  lateral portal was localized with spinal needle, nick incision, and  insertion of a disposable trocar. Arthroscopic shaver was then inserted to  complete a subacromial bursectomy. The rotator cuff was inspected and the large tear confirmed and debrided with arthroscopic shaver to stable margins.      Arthroscopic ArthroCare wand was then inserted and used to expose the  subacromial space further and a subacromial decompression completed with a  4.0 barrel bur from anterolateral to anteromedial and from anteroposterior,  removing approximately 4 mm of bone, decompressing the space nicely. Arthroscopic rotator cuff repair was then achieved using the [employer]  SpeedBridge System. Through two small percutaneous incisions a anterior-medial anchor was placed and used to incorporate the biceps tendon after it was retrieved. A second  medial row anchor was then placed posterior. Sutures were then passed through the rotator cuff and brought out laterally. A horizontal mattress suture was placed anteirorly to repair the upper subscapularis tendon. The suture tapes were then trimmed and two lateral SwiveLock anchors were  then inserted repairing the rotator cuff down through its footprint very  nicely. The anteromedial swivel lock anchor was used to tenodesis the biceps tendon after it was retrieved along with the previous placed sutures. The shoulder was brought through range of motion and found to have a  stable rotator cuff repair. The shoulder was then evacuated off fluid. Portal sites  were then closed. Local anesthetic infiltrated. Bulky sterile dressing  and shoulder immobilizer applied. The patient tolerated the procedure well  and was taken to the recovery room.

## 2023-03-01 NOTE — PROGRESS NOTES
1015: Dr. Eitan Dewitt called, 86-89% on 8L simple mask, placed on NRB 15L and orders for breathing treatment. 1310: Dr. Eitan Dewitt at bedside. Ok to move to Stage 2.

## 2023-03-01 NOTE — ANESTHESIA PROCEDURE NOTES
Peripheral Block    Patient location during procedure: procedure area  Reason for block: post-op pain management and at surgeon's request  Start time: 3/1/2023 7:50 AM  End time: 3/1/2023 8:00 AM  Staffing  Performed: anesthesiologist   Anesthesiologist: Crys Hilliard DO  Preanesthetic Checklist  Completed: patient identified, IV checked, site marked, risks and benefits discussed, surgical/procedural consents, equipment checked, pre-op evaluation, timeout performed, anesthesia consent given, oxygen available, monitors applied/VS acknowledged, fire risk safety assessment completed and verbalized and blood product R/B/A discussed and consented  Peripheral Block   Patient position: supine  Prep: ChloraPrep  Provider prep: mask and sterile gloves  Patient monitoring: cardiac monitor, continuous pulse ox, frequent blood pressure checks and IV access  Block type: Brachial plexus  Interscalene  Laterality: right  Injection technique: single-shot  Guidance: nerve stimulator and ultrasound guided  Local infiltration: ropivacaine  Infiltration strength: 0.5 %  Local infiltration: ropivacaine  Dose: 30 mL    Needle   Needle type: insulated echogenic nerve stimulator needle (Stimuplex)   Needle gauge: 20 G  Needle localization: nerve stimulator and ultrasound guidance  Needle length: 10 cm  Assessment   Injection assessment: negative aspiration for heme, no paresthesia on injection and local visualized surrounding nerve on ultrasound  Slow fractionated injection: yes  Hemodynamics: stable  Real-time US image taken/store: yes  Outcomes: uncomplicated and patient tolerated procedure well    Additional Notes  1.0 miliamps.   Lost at 0.60 miliamps

## 2023-03-01 NOTE — H&P
Department of Orthopedic Surgery  History and Physical        CHIEF COMPLAINT: MRI follow up     HISTORY OF PRESENT ILLNESS:                 The patient is a right-hand-dominant 59 y.o. female who presents with bilateral hand paresthesias and right upper arm burning and shoulder pain. Reports that July 2020 she fell down 2 steps and \"broke my elbow\". Since that time she has been experiencing paresthesias to the right upper extremity. She is also complaining of a 2-month history of bilateral hand numbness that ebbs and flows throughout the day. There are no specific exacerbating or relieving factors nor particular times of the day that the paresthesias are worse. Denies symptoms worse at night. Patient complaining of upper arm burning and aching pain. She has trialed over-the-counter anti-inflammatories and chiropractic medicine without much relief in her symptoms. Patient had an EMG/NCS dated 4/5/2022     Right median nerve motor latency: 3.80 ms  Right ulnar nerve velocity: Above the elbow 62 ms, below the elbow 52 ms  Right median nerve sensory latency: 2.81 ms    EMG portion of the exam demonstrates borderline median nerve mononeuropathy. Patient is scheduled for a left thumb, index and middle finger trigger release with left carpal tunnel release next week. She follows up here for MRI follow-up of her right shoulder. She did have an injection 3 months ago which provided her 3 weeks of relief of pain to her right shoulder. She did complete therapy. She reports therapy did help improve her range of motion and strength to her right shoulder. She still reports persistent pain to the right shoulder.       Past Medical History:    Past Medical History             Diagnosis Date    Diabetes mellitus ([address]      GERD (gastroesophageal reflux disease)      Hypertension      IBS (irritable bowel syndrome)           Past Surgical History:    Past Surgical History             Procedure Laterality Date CHOLECYSTECTOMY   11/11/2011    FOOT SURGERY        FOOT SURGERY   03/2019     plantar faschia    HEEL SPUR SURGERY   12/2016    JOINT REPLACEMENT         toe joints replacement 3/2017 and 9/2017    JOINT REPLACEMENT         both big toes    PARTIAL HYSTERECTOMY (CERVIX NOT REMOVED)             Current Medications:   Current Hospital Medications   No current facility-administered medications for this visit. Allergies:  Norco [hydrocodone-acetaminophen] and Demerol hcl [meperidine]     Social History:   TOBACCO:   reports that she has never smoked. She has never used smokeless tobacco.  ETOH:   reports no history of alcohol use. DRUGS:   reports no history of drug use. ACTIVITIES OF DAILY LIVING:    OCCUPATION:    Family History:   Family History             Problem Relation Age of Onset    Alzheimer's Disease Father      Heart Disease Mother      Emphysema Mother      Other Other           RESPIRATORY DISEASE            REVIEW OF SYSTEMS:  Constitutional: Negative  Cardiovascular: Negative  Respiratory: Negative  Gastrointestinal: Negative  Musculoskeletal: Right shoulder pain  Neurological: left hand numbness and tingling  Behavioral/psychological: Negative     PHYSICAL EXAM:    VITALS:  Ht 5' 7\" (1.702 m)   Wt (!) 305 lb (138.3 kg)   BMI 47.77 kg/m²   CONSTITUTIONAL:  awake, alert, cooperative, no apparent distress, and appears stated age  EYES:  Lids and lashes normal, pupils equal, round and reactive to light, extra ocular muscles intact, sclera clear, conjunctiva normal  ENT:  Normocephalic, without obvious abnormality, atraumatic, sinuses nontender on palpation, external ears without lesions, oral pharynx with moist mucus membranes, tonsils without erythema or exudates, gums normal and good dentition. NECK:  Supple, symmetrical, trachea midline, no adenopathy, thyroid symmetric, not enlarged and no tenderness, skin normal  NEUROLOGIC:  Awake, alert, oriented to name, place and time.   Cranial nerves II-XII are grossly intact. Motor is 5 out of 5 bilaterally. Sensory is intact.  gait is normal.  MUSCULOSKELETAL:     Right upper extremity:  Skin intact circumferentially  Positive shoulder impingement  + tenderness over the long head of the biceps tendon  Shoulder forward flexion 180 degrees  Shoulder abduction 170 degrees  Shoulder internal rotation to T12  Shoulder external rotation 45 degrees  Supraspinatus strength 4/5, subscapularis strength 4/5, teres minor strength 4/5, infraspinatus strength 4/5  Compartments soft and compressible  +AIN/PIN/Ulnar nerve function intact grossly  +Radial pulse, Brisk Cap refill, hand warm and perfused  Sensation intact to touch in radial/ulnar/median nerve distributions to hand     DATA:    CBC:         Lab Results   Component Value Date/Time     WBC 7.8 03/06/2021 04:06 PM     RBC 4.70 03/06/2021 04:06 PM     HGB 13.1 03/06/2021 04:06 PM     HCT 43.3 03/06/2021 04:06 PM     MCV 92.1 03/06/2021 04:06 PM     MCH 27.9 03/06/2021 04:06 PM     MCHC 30.3 03/06/2021 04:06 PM     RDW 14.9 03/06/2021 04:06 PM      03/06/2021 04:06 PM     MPV 10.8 03/06/2021 04:06 PM      PT/INR:          Lab Results   Component Value Date/Time     PROTIME 11.8 05/15/2015 06:17 AM     PROTIME 11.1 05/18/2012 08:44 PM     INR 1.1 05/15/2015 06:17 AM         Radiology Review:   MRI of the right shoulder were reviewed in coronal, sagittal, axial planes from 11/29/22     FINDINGS:   ROTATOR CUFF:  Full-thickness partial width tear of the supraspinatus tendon   critical zone measuring 16 x 13 mm. This is at the central tendon fibers. Tendon is partially retracted to the lateral 3rd of the humeral head. No   significant muscle edema or atrophy. Intact infraspinatus tendon. Low-grade partial-thickness articular surface   tear of the superior subscapularis tendon measuring approximately 6 mm. Intact teres minor tendon.        BICEPS TENDON: Intact vertical and horizontal portions of the long head of   the biceps tendon.       LABRUM: The glenoid labrum is intact to the extent that it is visualized. No   paralabral cyst.       GLENOHUMERAL JOINT: Physiologic amount of joint fluid. No evidence of   high-grade cartilage loss. Normal alignment.       AC JOINT AND ACROMIOCLAVICULAR ARCH:       Acromion shape: The acromion is flat.       Subacromial spur: No       Arthritis: Capsular hypertrophy with osteophytes.       BONE MARROW: No evidence of fracture. Normal marrow signal.       OUTLET SPACES:       Fluid in the subacromial/subdeltoid bursa related to rotator cuff tear.       Unremarkable MRI appearance of the quadrilateral space.       Mild narrowing of the supraspinatus outlet.       Thickening of the inferior glenohumeral ligament and scarring or intermediate   signal in the rotator interval.           Impression   1. Full-thickness partial width tear of the supraspinatus tendon with tendon   partially retracted to the lateral humeral head.  No associated muscle   atrophy.  Additional low-grade partial-thickness tear of the superior   subscapularis tendon.   2. Mild acromioclavicular joint osteoarthritis.   3. Findings associated with adhesive capsulitis.            IMPRESSION:  Right shoulder rotator cuff rear  Bilateral carpal tunnel syndrome right worse than left  Locked left thumb trigger  Left index and middle and ring finger triggers  Right cubital tunnel syndrome  Diabetes     PLAN:  Discussed findings with patient.   Did discuss she does have a large rotator cuff tear on the right.  Recommended surgical repair once she is recovered from surgery on her left. Plan for a right shoulder arthroscopy with decompression, biceps tenotomy vs tenodesis and right shoulder rotator cuff repair. Postoperative course explained to the patient. Patient would like to proceed. All questions answered.     I explained the risks, benefits, alternatives and complications of surgery with the patient including  but not limited to the risks of infection, possible damage to nerves, vessels, or tendons, stiffness, loss of range of motion, scar sensitivity, wound healing complications, worsening symptoms, possible need for therapy, as well as the possible need further surgery and unanticipated complications. The patient voiced understanding and all questions were answered. The patient elected to proceed with surgical intervention. I have seen and evaluated the patient and agree with the above assessment and plan on today's visit. I have performed the key components of the history and physical examination with significant findings of right shoulder rotator cuff tear. I concur with the findings and plan as documented. History and Physical Update     Patient was seen and examined. Patient's history and physical was reviewed with the patient. There has been no significant interval changes.

## 2023-03-01 NOTE — ANESTHESIA PRE PROCEDURE
Department of Anesthesiology  Preprocedure Note       Name:  Ervin Rachel   Age:  59 y.o.  :  1965                                          MRN:  [id number]         Date:  3/1/2023      Surgeon: Lisa Parker):  Elli Patiño MD    Procedure: Procedure(s):  RIGHT SHOULDER ARTHROSCOPY WITH DECOMPRESSION ROTATOR CUFF REPAIR POSSIBLE BICEPS TENODESIS VS TENOTOMY ++ARTHREX++  ++ISB++    Medications prior to admission:   Prior to Admission medications    Medication Sig Start Date End Date Taking? Authorizing Provider   oxyCODONE-acetaminophen (PERCOCET) 5-325 MG per tablet Take 1 tablet by mouth every 6 hours as needed for Pain for up to 7 days. Max Daily Amount: 4 tablets 3/1/23 3/8/23 Yes MANJULA López   vitamin B-12 (CYANOCOBALAMIN) 100 MCG tablet Take by mouth daily    Historical Provider, MD   albuterol sulfate HFA (PROVENTIL;VENTOLIN;PROAIR) 108 (90 Base) MCG/ACT inhaler albuterol sulfate HFA 90 mcg/actuation aerosol inhaler   INHALE TWO PUFFS BY MOUTH EVERY 4 HOURS    Historical Provider, MD   albuterol (PROVENTIL) (2.5 MG/3ML) 0.083% nebulizer solution albuterol sulfate 2.5 mg/3 mL (0.083 %) solution for nebulization   INHALE THE CONTENTS OF ONE VIAL (3ML) VIA NEBULIZER 4 TIMES A DAY.     Historical Provider, MD   famotidine (PEPCID) 40 MG tablet Take 40 mg by mouth nightly as needed    Historical Provider, MD   vitamin D3 (CHOLECALCIFEROL) 125 MCG (5000 UT) TABS tablet cholecalciferol (vitamin D3) 125 mcg (5,000 unit) tablet   TAKE ONE TABLET BY MOUTH EVERY DAY    Historical Provider, MD   meloxicam (MOBIC) 7.5 MG tablet Take 7.5 mg by mouth daily 19   Historical Provider, MD   metFORMIN (GLUCOPHAGE) 500 MG tablet Take 500 mg by mouth daily (with breakfast)    Historical Provider, MD   atenolol (TENORMIN) 25 MG tablet Take 25 mg by mouth every morning    Historical Provider, MD   pantoprazole (PROTONIX) 40 MG tablet Take 40 mg by mouth every morning 11/10/16   Historical Provider, MD PARoxetine (PAXIL) 10 MG tablet Take 10 mg by mouth every morning 11/11/16   Historical Provider, MD       Current medications:    Current Facility-Administered Medications   Medication Dose Route Frequency Provider Last Rate Last Admin    0.9 % sodium chloride infusion   IntraVENous Continuous Kenn Loots, PA        sodium chloride flush 0.9 % injection 5-40 mL  5-40 mL IntraVENous 2 times per day Kenn Loots, PA        sodium chloride flush 0.9 % injection 5-40 mL  5-40 mL IntraVENous PRN Kenn Loots, PA        0.9 % sodium chloride infusion   IntraVENous PRN Kenn Loots, PA        ceFAZolin (ANCEF) 2,000 mg in sterile water 20 mL IV syringe  2,000 mg IntraVENous Once Kenn Loots, PA           Allergies:     Allergies   Allergen Reactions    Demerol Hcl [Meperidine] Nausea And Vomiting    Norco [Hydrocodone-Acetaminophen] Dizziness or Vertigo       Problem List:    Patient Active Problem List   Diagnosis Code    Chest pain R07.9    Obesity E66.9    Hypokalemia E87.6    Post-operative pain G89.18       Past Medical History:        Diagnosis Date    Diabetes mellitus ([facility] [address]     GERD (gastroesophageal reflux disease)     Hypertension     IBS (irritable bowel syndrome)     Prolonged emergence from general anesthesia     Whooping cough 2020       Past Surgical History:        Procedure Laterality Date    CHOLECYSTECTOMY  11/11/2011    FINGER TRIGGER RELEASE Left 12/28/2022    LEFT THUMB TRIGGER  RELEASE LEFT INDEX AND MIDDLE FINGER TRIGGER RELEASE LEFT CARPAL TUNNEL RELEASE performed by Kale Villeda MD at [address] Right 03/2019    plantar    HEEL SPUR SURGERY Left 12/2016    JOINT REPLACEMENT Bilateral 09/2017    Great toe joints    JOINT REPLACEMENT Bilateral 03/2017    both big toes    PARTIAL HYSTERECTOMY (CERVIX NOT REMOVED)         Social History:    Social History     Tobacco Use    Smoking status: Never    Smokeless tobacco: Never   Substance Use Topics  Alcohol use: No                                Counseling given: Not Answered      Vital Signs (Current):   Vitals:    02/27/23 1551 03/01/23 0712   BP:  139/82   Pulse:  71   Resp:  16   Temp:  36.4 °C (97.5 °F)   TempSrc:  Temporal   SpO2:  97%   Weight: 290 lb (131.5 kg) 290 lb (131.5 kg)   Height: 5' 7\" (1.702 m) 5' 7\" (1.702 m)                                              BP Readings from Last 3 Encounters:   03/01/23 139/82   12/28/22 (!) 162/79   05/02/22 (!) 146/56       NPO Status: Time of last liquid consumption: 1830                        Time of last solid consumption: 1830                        Date of last liquid consumption: 02/28/23                        Date of last solid food consumption: 02/28/23    BMI:   Wt Readings from Last 3 Encounters:   03/01/23 290 lb (131.5 kg)   01/18/23 295 lb (133.8 kg)   01/06/23 295 lb (133.8 kg)     Body mass index is 45.42 kg/m². CBC:   Lab Results   Component Value Date/Time    WBC 8.7 03/01/2023 06:40 AM    RBC 4.40 03/01/2023 06:40 AM    HGB 12.9 03/01/2023 06:40 AM    HCT 41.5 03/01/2023 06:40 AM    MCV 94.3 03/01/2023 06:40 AM    RDW 16.2 03/01/2023 06:40 AM     03/01/2023 06:40 AM       CMP:   Lab Results   Component Value Date/Time     03/01/2023 06:40 AM    K 4.2 03/01/2023 06:40 AM    K 4.4 03/06/2021 04:06 PM     03/01/2023 06:40 AM    CO2 28 03/01/2023 06:40 AM    BUN 17 03/01/2023 06:40 AM    CREATININE 0.9 03/01/2023 06:40 AM    GFRAA 56 03/06/2021 04:06 PM    LABGLOM >60 03/01/2023 06:40 AM    GLUCOSE 166 03/01/2023 06:40 AM    GLUCOSE 109 05/18/2012 08:44 PM    PROT 7.0 03/06/2021 04:06 PM    CALCIUM 9.3 03/01/2023 06:40 AM    BILITOT 0.3 03/06/2021 04:06 PM    ALKPHOS 115 03/06/2021 04:06 PM    AST 24 03/06/2021 04:06 PM    ALT 26 03/06/2021 04:06 PM       POC Tests: No results for input(s): POCGLU, POCNA, POCK, POCCL, POCBUN, POCHEMO, POCHCT in the last 72 hours.     Coags:   Lab Results   Component Value Date/Time PROTIME 11.8 05/15/2015 06:17 AM    PROTIME 11.1 05/18/2012 08:44 PM    INR 1.1 05/15/2015 06:17 AM    APTT 30.7 05/18/2012 08:44 PM       HCG (If Applicable):   Lab Results   Component Value Date    PREGTESTUR NEGATIVE 05/18/2012        ABGs: No results found for: PHART, PO2ART, NLU9QPL, BCM6XUW, BEART, Y3EGPOUP     Type & Screen (If Applicable):  No results found for: LABABO, LABRH    Drug/Infectious Status (If Applicable):  No results found for: HIV, HEPCAB    COVID-19 Screening (If Applicable): No results found for: COVID19        Anesthesia Evaluation  Patient summary reviewed and Nursing notes reviewed no history of anesthetic complications:   Airway: Mallampati: II  TM distance: >3 FB   Neck ROM: full  Mouth opening: > = 3 FB   Dental: normal exam         Pulmonary:Negative Pulmonary ROS breath sounds clear to auscultation                             Cardiovascular:    (+) hypertension:,         Rhythm: regular  Rate: normal                    Neuro/Psych:   Negative Neuro/Psych ROS              GI/Hepatic/Renal:   (+) GERD:, morbid obesity          Endo/Other:    (+) DiabetesType II DM, well controlled, , .                 Abdominal:             Vascular: negative vascular ROS. Other Findings:           Anesthesia Plan      general and regional     ASA 3     (Rt. ISNB - R & B discussed and pt. Agrees to this.)  Induction: intravenous. MIPS: Postoperative opioids intended and Prophylactic antiemetics administered. Anesthetic plan and risks discussed with patient. Use of blood products discussed with patient whom consented to blood products. Plan discussed with attending. LUZ Preciado - CRNA   3/1/2023        Patient seen and examined, chart reviewed, agree with above findings. Anesthetic plan, risks, benefits, alternatives, and personnel involved discussed with patient. Patient verbalized an understanding and agreed to proceed. NPO status confirmed.     Anesthetic plan discussed with care team members and agreed upon.     Crys Hilliard DO   3/1/2023  7:50 AM

## 2023-03-09 ENCOUNTER — OFFICE VISIT (OUTPATIENT)
Dept: ORTHOPEDIC SURGERY | Age: 58
End: 2023-03-09

## 2023-03-09 VITALS — HEIGHT: 67 IN | WEIGHT: 290 LBS | BODY MASS INDEX: 45.52 KG/M2

## 2023-03-09 DIAGNOSIS — G89.18 POST-OPERATIVE PAIN: ICD-10-CM

## 2023-03-09 PROCEDURE — 99024 POSTOP FOLLOW-UP VISIT: CPT | Performed by: ORTHOPAEDIC SURGERY

## 2023-03-09 RX ORDER — IBUPROFEN 600 MG/1
600 TABLET ORAL 4 TIMES DAILY PRN
Qty: 360 TABLET | Refills: 1 | Status: SHIPPED | OUTPATIENT
Start: 2023-03-09

## 2023-03-09 RX ORDER — OXYCODONE HYDROCHLORIDE AND ACETAMINOPHEN 5; 325 MG/1; MG/1
1 TABLET ORAL EVERY 6 HOURS PRN
Qty: 28 TABLET | Refills: 0 | Status: SHIPPED | OUTPATIENT
Start: 2023-03-09 | End: 2023-03-16

## 2023-03-09 NOTE — PROGRESS NOTES
HPI: Patient presents today POD #8 s/p right shoulder arthroscopy with debridement and biceps tenodesis and rotator cuff repair. Overall she is doing well. Pain is controlled with medications. She has been compliant with her sling. Physical Exam: Incisions clean dry intact. No erythema or signs of infection. Good range of motion of the elbow wrist and fingers. Demonstrates understanding of passive versus active range of motion to the shoulder. Median, Ulnar, radial nerves intact light touch. Brisk capillary refill. Assessment: POD #8 s/p right shoulder arthroscopy with debridement and biceps tenodesis and rotator cuff repair    Plan:   Scar management advised. Okay for active range of motion of the elbow wrist and fingers. Only passive range of motion to the shoulder at this time. We will hold off on therapy referral at this time. Discussed need for compliance postoperatively. Continue sling. Follow up in 1 month before starting formal therapy. All questions and concerns answered. I have seen and evaluated the patient and agree with the above assessment and plan on today's visit. I have performed the key components of the history and physical examination with significant findings of postop doing well. I concur with the findings and plan as documented. Johanna Jiang MD  3/9/2023    Informed consent was obtained for continued opioid treatment. Patient agrees to continue the current treatment and agrees the benefits of opioid treatment ( decreased pain, improved function) continue to outweigh the potential risks ( confusion, change in thinking ability, depression, dry mouth, nausea, constipation, vomiting, impaired coordination/balance, sleepiness, damage liver or kidneys, opioid-induced hyperalgesia, physical dependence, addiction, withdrawal, respiratory depression and even death).

## 2023-04-04 ENCOUNTER — HOSPITAL ENCOUNTER (OUTPATIENT)
Age: 58
Discharge: HOME | End: 2023-04-06
Payer: COMMERCIAL

## 2023-04-04 ENCOUNTER — HOSPITAL ENCOUNTER (OUTPATIENT)
Dept: ULTRASOUND IMAGING | Age: 58
Discharge: HOME | End: 2023-04-06
Payer: COMMERCIAL

## 2023-04-04 DIAGNOSIS — R60.0 LOCALIZED EDEMA: ICD-10-CM

## 2023-04-04 PROCEDURE — 93970 EXTREMITY STUDY: CPT

## 2023-04-06 ENCOUNTER — OFFICE VISIT (OUTPATIENT)
Dept: ORTHOPEDIC SURGERY | Age: 58
End: 2023-04-06

## 2023-04-06 VITALS — HEIGHT: 67 IN | WEIGHT: 293 LBS | BODY MASS INDEX: 45.99 KG/M2

## 2023-04-06 DIAGNOSIS — G89.18 POST-OPERATIVE PAIN: Primary | ICD-10-CM

## 2023-04-06 DIAGNOSIS — S46.011S TRAUMATIC INCOMPLETE TEAR OF RIGHT ROTATOR CUFF, SEQUELA: ICD-10-CM

## 2023-04-06 PROCEDURE — 99024 POSTOP FOLLOW-UP VISIT: CPT | Performed by: ORTHOPAEDIC SURGERY

## 2023-04-06 NOTE — PROGRESS NOTES
HPI: Patient presents today almost 6 weeks s/p right shoulder arthroscopy with debridement and biceps tenodesis and rotator cuff repair. Overall she is doing well. Pain is controlled. She has been compliant postoperative. She did relate that she has a high copay for each therapy session. Physical Exam: Incisions healing well. No erythema or signs of infection. Good range of motion of the elbow wrist and fingers. Active shoulder abduction 50 degrees. Patient can place and hold. Median, Ulnar, radial nerves intact light touch. Brisk capillary refill. Assessment: 6 weeks s/p right shoulder arthroscopy with debridement and biceps tenodesis and rotator cuff repair    Plan:   Patient to start PT. Okay to wean out of sling. Start active ROM to the shoulder. Strengthening at 8 weeks post op. Follow up in 1 month   All questions and concerns answered. I have seen and evaluated the patient and agree with the above assessment and plan on today's visit. I have performed the key components of the history and physical examination with significant findings of 6 weeks postop. I concur with the findings and plan as documented.     Soumya Schultz MD  4/6/2023

## 2023-05-08 ENCOUNTER — OFFICE VISIT (OUTPATIENT)
Dept: ORTHOPEDIC SURGERY | Age: 58
End: 2023-05-08

## 2023-05-08 VITALS — BODY MASS INDEX: 45.99 KG/M2 | WEIGHT: 293 LBS | HEIGHT: 67 IN

## 2023-05-08 DIAGNOSIS — S46.011S TRAUMATIC INCOMPLETE TEAR OF RIGHT ROTATOR CUFF, SEQUELA: Primary | ICD-10-CM

## 2023-05-08 PROCEDURE — 99024 POSTOP FOLLOW-UP VISIT: CPT | Performed by: ORTHOPAEDIC SURGERY

## 2023-05-08 NOTE — PROGRESS NOTES
HPI: Patient presents today 9 weeks s/p right shoulder arthroscopy with debridement and biceps tenodesis and rotator cuff repair. Overall she is doing well. She is in therapy. She is making good progress. She has not started strengthening. Physical Exam: Incisions healing well. No erythema or signs of infection. Good range of motion of the elbow wrist and fingers. Active shoulder abduction 70 degrees. Active FF 90 degrees. Median, Ulnar, radial nerves intact light touch. Brisk capillary refill. Assessment: 9 weeks s/p right shoulder arthroscopy with debridement and biceps tenodesis and rotator cuff repair    Plan:   Continue therapy. Start strengthening. Follow up in 1 month. All questions and concerns answered. I have seen and evaluated the patient and agree with the above assessment and plan on today's visit. I have performed the key components of the history and physical examination with significant findings of postop doing very well. I concur with the findings and plan as documented.     Tamara Lopez MD  5/8/2023

## 2023-05-26 PROBLEM — E11.9 DIABETES MELLITUS (HCC): Status: ACTIVE | Noted: 2022-01-21

## 2023-05-26 PROBLEM — I10 ESSENTIAL HYPERTENSION: Status: ACTIVE | Noted: 2022-01-21

## 2023-06-20 ENCOUNTER — OFFICE VISIT (OUTPATIENT)
Dept: ORTHOPEDIC SURGERY | Age: 58
End: 2023-06-20
Payer: COMMERCIAL

## 2023-06-20 VITALS — HEIGHT: 67 IN | BODY MASS INDEX: 45.52 KG/M2 | WEIGHT: 290 LBS

## 2023-06-20 DIAGNOSIS — S46.011S TRAUMATIC INCOMPLETE TEAR OF RIGHT ROTATOR CUFF, SEQUELA: Primary | ICD-10-CM

## 2023-06-20 PROCEDURE — 99213 OFFICE O/P EST LOW 20 MIN: CPT | Performed by: PHYSICIAN ASSISTANT

## 2023-06-20 RX ORDER — EMPAGLIFLOZIN 25 MG/1
25 TABLET, FILM COATED ORAL DAILY
COMMUNITY
Start: 2023-06-08

## 2023-06-20 RX ORDER — CLINDAMYCIN PHOSPHATE 10 UG/ML
LOTION TOPICAL
COMMUNITY
Start: 2023-06-06

## 2023-06-20 NOTE — PROGRESS NOTES
Chief Complaint   Patient presents with    Follow Up After Procedure     FU Rt shoulder scope with rotator cuff repair. Finished PT that was ordered. Doing well. Katerin Dejesus is a 59y.o. year old  who presents for follow up almost 4 months status post right shoulder arthroscopy with debridement and bicep tenodesis with rotator cuff repair. Well. She finished therapy on Friday. She is happy with her range of motion. Her strength is her biggest complaint. She states this is slowly returning. She denies any new injury.       Past Medical History:   Diagnosis Date    Diabetes mellitus ([address]     GERD (gastroesophageal reflux disease)     Hypertension     IBS (irritable bowel syndrome)     Prolonged emergence from general anesthesia     Whooping cough 2020     Past Surgical History:   Procedure Laterality Date    CHOLECYSTECTOMY  11/11/2011    FINGER TRIGGER RELEASE Left 12/28/2022    LEFT THUMB TRIGGER  RELEASE LEFT INDEX AND MIDDLE FINGER TRIGGER RELEASE LEFT CARPAL TUNNEL RELEASE performed by Magy Ramires MD at [place] [address] Right 03/2019    plantar    HEEL SPUR SURGERY Left 12/2016    JOINT REPLACEMENT Bilateral 09/2017    Great toe joints    JOINT REPLACEMENT Bilateral 03/2017    both big toes    PARTIAL HYSTERECTOMY (CERVIX NOT REMOVED)      SHOULDER ARTHROSCOPY Right 3/1/2023    RIGHT SHOULDER ARTHROSCOPY WITH DECOMPRESSION ROTATOR CUFF REPAIR POSSIBLE BICEPS TENODESIS performed by Magy Ramires MD at [facility] OR       Current Outpatient Medications:     clindamycin (CLEOCIN T) 1 % lotion, Apply to affected area twice daily as needed, Disp: , Rfl:     JARDIANCE 25 MG tablet, Take 1 tablet by mouth daily, Disp: , Rfl:     metFORMIN (GLUCOPHAGE) 1000 MG tablet, Take 1 tablet by mouth 2 times daily, Disp: , Rfl:     mupirocin (BACTROBAN) 2 % ointment, APPLY TO OPEN WOUNDS AND INSIDE BOTH NOSTRILS TWICE DAILY, Disp: , Rfl:     phenazopyridine (PYRIDIUM) 200 MG tablet, TAKE ONE TABLET BY

## 2024-03-12 ENCOUNTER — OFFICE VISIT (OUTPATIENT)
Dept: ORTHOPEDIC SURGERY | Age: 59
End: 2024-03-12
Payer: COMMERCIAL

## 2024-03-12 VITALS — WEIGHT: 293 LBS | HEIGHT: 67 IN | BODY MASS INDEX: 45.99 KG/M2

## 2024-03-12 DIAGNOSIS — M25.512 LEFT SHOULDER PAIN, UNSPECIFIED CHRONICITY: Primary | ICD-10-CM

## 2024-03-12 PROCEDURE — 99203 OFFICE O/P NEW LOW 30 MIN: CPT | Performed by: NURSE PRACTITIONER

## 2024-03-12 NOTE — PROGRESS NOTES
negative    Shoulder Exam:   Left shoulder exam range of motion 180/90/T6.  Reported mild pain with Jobes test.  Reported worsening pain with Speed's.  Intact Manchester's test.  Intact belly press test.  Positive anterior shoulder tenderness.    IMAGING:     XRAY:  3 views of the left shoulder show grossly maintained joint space.  Negative for acute bony abnormality.  Normal shoulder x-ray.    Radiographic findings reviewed with patient    ASSESSMENT   Left shoulder pain      PLAN  Today we discussed her left shoulder.  Patient reports recent onset of pain after performing daily activities she felt and heard a loud pop in her shoulder.  Reports persistent pain with overhead activities at this time.  Exam today displayed full range of motion of the shoulder.  She had worsening pain on her biceps testing today.  Conservative treatment options discussed with her included home exercises versus PT, potential corticosteroid injection, continued observation, oral anti-inflammatories.  Patient wished to proceed with advanced imaging and we will obtain an MRI of her shoulder to rule out potential rotator cuff tear.  We will call patient with results once MRI is completed and to schedule appropriate follow-up appointment.      LUZ Yanes-CNP  Orthopaedic Surgery   3/12/24  5:00 PM

## 2024-03-29 ENCOUNTER — HOSPITAL ENCOUNTER (OUTPATIENT)
Dept: MRI IMAGING | Age: 59
Discharge: HOME | End: 2024-03-29
Payer: COMMERCIAL

## 2024-03-29 DIAGNOSIS — M25.512 LEFT SHOULDER PAIN, UNSPECIFIED CHRONICITY: ICD-10-CM

## 2024-03-29 PROCEDURE — 73221 MRI JOINT UPR EXTREM W/O DYE: CPT

## 2024-04-02 NOTE — RESULT ENCOUNTER NOTE
Called patient this morning.  No answer.  Voice message left.  Patient instructed to call and schedule follow-up appointment to discuss MRI review and treatment options with Dr. Mack.

## 2024-04-17 ENCOUNTER — OFFICE VISIT (OUTPATIENT)
Dept: ORTHOPEDIC SURGERY | Age: 59
End: 2024-04-17
Payer: COMMERCIAL

## 2024-04-17 VITALS — BODY MASS INDEX: 45.99 KG/M2 | HEIGHT: 67 IN | WEIGHT: 293 LBS

## 2024-04-17 DIAGNOSIS — M25.512 LEFT SHOULDER PAIN, UNSPECIFIED CHRONICITY: Primary | ICD-10-CM

## 2024-04-17 PROCEDURE — 99214 OFFICE O/P EST MOD 30 MIN: CPT | Performed by: ORTHOPAEDIC SURGERY

## 2024-04-17 RX ORDER — METHYLPREDNISOLONE 4 MG/1
4 TABLET ORAL SEE ADMIN INSTRUCTIONS
COMMUNITY
Start: 2024-04-15

## 2024-04-17 RX ORDER — CYCLOBENZAPRINE HCL 10 MG
10 TABLET ORAL 2 TIMES DAILY PRN
COMMUNITY
Start: 2024-04-15

## 2024-04-17 RX ORDER — HYDROCHLOROTHIAZIDE 25 MG/1
25 TABLET ORAL DAILY
COMMUNITY

## 2024-04-17 NOTE — PROGRESS NOTES
[facility]   ORTHOPAEDIC SURGERY AND SPORTS MEDICINE  DATE OF VISIT: 04/17/24  Follow Up Visit     CHIEF COMPLAINT:   Chief Complaint   Patient presents with    Shoulder Pain     Pt is here today following up for left shoulder pain. She has completed a MRI of the left shoulder for review.        HPI:    Melisa Don is a 59 y.o. year old female who presented to the office today for follow up of her left shoulder.  She is here to review MRI.    REVIEW OF SYSTEMS:     Constitutional:  Negative for weight loss, fevers, chills, fatigue  Cardiovascular: Negative for chest pain, palpitations  Pulmonary: Negative for shortness of breath, labored breathing, cough  GI: negative for abdominal pain, nausea, vomiting   MSK: per HPI  Skin: negative for rash, open wounds    All other systems reviewed and are negative       Physical Exam:     Height: 1.702 m (5' 7\"), Weight - Scale: 136.1 kg (300 lb)    General: Alert and oriented x3, no acute distress  Cardiovascular/pulmonary: No labored breathing, peripheral perfusion intact  Musculoskeletal:    Exam of the shoulder shows range of motion 150/45/L1.  There is pain and mild weakness with Brandon test.  Speed test painful.  Yazoo's test painful.  Belly press test intact    Controlled Substances Monitoring:      Imaging:  MRI of the left shoulder reviewed showing small full-thickness rotator cuff tear of the anterior supraspinatus.      Assessment: Left shoulder rotator cuff tear      Plan:   We discussed her shoulder today.  She has attempted conservative treatment with no relief.  She has worsening pain and poor function.  She has a full-thickness tear of her rotator cuff on MRI.  We discussed both nonoperative and operative treatment options.  Risks benefits and alternatives to surgery were discussed in detail.  She is interested in proceeding with surgery which would involve left shoulder arthroscopy rotator cuff repair.  Will look at scheduling this in the near

## 2024-04-22 ENCOUNTER — APPOINTMENT (OUTPATIENT)
Dept: CT IMAGING | Age: 59
End: 2024-04-22
Payer: COMMERCIAL

## 2024-04-22 ENCOUNTER — APPOINTMENT (OUTPATIENT)
Dept: GENERAL RADIOLOGY | Age: 59
End: 2024-04-22
Payer: COMMERCIAL

## 2024-04-22 ENCOUNTER — HOSPITAL ENCOUNTER (INPATIENT)
Age: 59
LOS: 3 days | Discharge: HOME | End: 2024-04-25
Attending: EMERGENCY MEDICINE | Admitting: FAMILY MEDICINE
Payer: COMMERCIAL

## 2024-04-22 DIAGNOSIS — M51.26 LUMBAR HERNIATED DISC: Primary | ICD-10-CM

## 2024-04-22 DIAGNOSIS — M54.16 LUMBAR RADICULOPATHY: ICD-10-CM

## 2024-04-22 LAB
ALBUMIN SERPL-MCNC: 3.8 G/DL (ref 3.5–5.2)
ALP SERPL-CCNC: 94 U/L (ref 35–104)
ALT SERPL-CCNC: 26 U/L (ref 0–32)
ANION GAP SERPL CALCULATED.3IONS-SCNC: 9 MMOL/L (ref 7–16)
AST SERPL-CCNC: 29 U/L (ref 0–31)
BASOPHILS # BLD: 0.03 K/UL (ref 0–0.2)
BASOPHILS NFR BLD: 0 % (ref 0–2)
BILIRUB SERPL-MCNC: 0.2 MG/DL (ref 0–1.2)
BUN SERPL-MCNC: 24 MG/DL (ref 6–23)
CALCIUM SERPL-MCNC: 8.6 MG/DL (ref 8.6–10.2)
CHLORIDE SERPL-SCNC: 106 MMOL/L (ref 98–107)
CO2 SERPL-SCNC: 24 MMOL/L (ref 22–29)
CREAT SERPL-MCNC: 0.9 MG/DL (ref 0.5–1)
EOSINOPHIL # BLD: 0.11 K/UL (ref 0.05–0.5)
EOSINOPHILS RELATIVE PERCENT: 1 % (ref 0–6)
ERYTHROCYTE [DISTWIDTH] IN BLOOD BY AUTOMATED COUNT: 15.7 % (ref 11.5–15)
GFR SERPL CREATININE-BSD FRML MDRD: 72 ML/MIN/1.73M2
GLUCOSE BLD-MCNC: 211 MG/DL (ref 74–99)
GLUCOSE SERPL-MCNC: 94 MG/DL (ref 74–99)
HCT VFR BLD AUTO: 47.1 % (ref 34–48)
HGB BLD-MCNC: 14.2 G/DL (ref 11.5–15.5)
IMM GRANULOCYTES # BLD AUTO: 0.04 K/UL (ref 0–0.58)
IMM GRANULOCYTES NFR BLD: 0 % (ref 0–5)
LYMPHOCYTES NFR BLD: 1.5 K/UL (ref 1.5–4)
LYMPHOCYTES RELATIVE PERCENT: 13 % (ref 20–42)
MCH RBC QN AUTO: 28.3 PG (ref 26–35)
MCHC RBC AUTO-ENTMCNC: 30.1 G/DL (ref 32–34.5)
MCV RBC AUTO: 94 FL (ref 80–99.9)
MONOCYTES NFR BLD: 0.69 K/UL (ref 0.1–0.95)
MONOCYTES NFR BLD: 6 % (ref 2–12)
NEUTROPHILS NFR BLD: 79 % (ref 43–80)
NEUTS SEG NFR BLD: 8.87 K/UL (ref 1.8–7.3)
PLATELET # BLD AUTO: 196 K/UL (ref 130–450)
PMV BLD AUTO: 10.5 FL (ref 7–12)
POTASSIUM SERPL-SCNC: 4.2 MMOL/L (ref 3.5–5)
PROT SERPL-MCNC: 6.5 G/DL (ref 6.4–8.3)
RBC # BLD AUTO: 5.01 M/UL (ref 3.5–5.5)
SODIUM SERPL-SCNC: 139 MMOL/L (ref 132–146)
WBC OTHER # BLD: 11.2 K/UL (ref 4.5–11.5)

## 2024-04-22 PROCEDURE — 96374 THER/PROPH/DIAG INJ IV PUSH: CPT

## 2024-04-22 PROCEDURE — 73502 X-RAY EXAM HIP UNI 2-3 VIEWS: CPT

## 2024-04-22 PROCEDURE — 72131 CT LUMBAR SPINE W/O DYE: CPT

## 2024-04-22 PROCEDURE — 2580000003 HC RX 258

## 2024-04-22 PROCEDURE — 96375 TX/PRO/DX INJ NEW DRUG ADDON: CPT

## 2024-04-22 PROCEDURE — 80053 COMPREHEN METABOLIC PANEL: CPT

## 2024-04-22 PROCEDURE — 82962 GLUCOSE BLOOD TEST: CPT

## 2024-04-22 PROCEDURE — 6360000002 HC RX W HCPCS: Performed by: EMERGENCY MEDICINE

## 2024-04-22 PROCEDURE — 85025 COMPLETE CBC W/AUTO DIFF WBC: CPT

## 2024-04-22 PROCEDURE — 99285 EMERGENCY DEPT VISIT HI MDM: CPT

## 2024-04-22 PROCEDURE — 6360000002 HC RX W HCPCS

## 2024-04-22 PROCEDURE — 6370000000 HC RX 637 (ALT 250 FOR IP)

## 2024-04-22 PROCEDURE — 1200000000 HC SEMI PRIVATE

## 2024-04-22 RX ORDER — FAMOTIDINE 20 MG/1
20 TABLET, FILM COATED ORAL 2 TIMES DAILY
Status: DISCONTINUED | OUTPATIENT
Start: 2024-04-22 | End: 2024-04-25 | Stop reason: HOSPADM

## 2024-04-22 RX ORDER — ONDANSETRON 2 MG/ML
4 INJECTION INTRAMUSCULAR; INTRAVENOUS EVERY 6 HOURS PRN
Status: DISCONTINUED | OUTPATIENT
Start: 2024-04-22 | End: 2024-04-25 | Stop reason: HOSPADM

## 2024-04-22 RX ORDER — MORPHINE SULFATE 4 MG/ML
INJECTION, SOLUTION INTRAMUSCULAR; INTRAVENOUS
Status: COMPLETED
Start: 2024-04-22 | End: 2024-04-22

## 2024-04-22 RX ORDER — SODIUM CHLORIDE 0.9 % (FLUSH) 0.9 %
10 SYRINGE (ML) INJECTION PRN
Status: DISCONTINUED | OUTPATIENT
Start: 2024-04-22 | End: 2024-04-25 | Stop reason: HOSPADM

## 2024-04-22 RX ORDER — ALBUTEROL SULFATE 2.5 MG/3ML
2.5 SOLUTION RESPIRATORY (INHALATION) EVERY 4 HOURS PRN
Status: DISCONTINUED | OUTPATIENT
Start: 2024-04-22 | End: 2024-04-25 | Stop reason: HOSPADM

## 2024-04-22 RX ORDER — POTASSIUM CHLORIDE 7.45 MG/ML
10 INJECTION INTRAVENOUS PRN
Status: DISCONTINUED | OUTPATIENT
Start: 2024-04-22 | End: 2024-04-25 | Stop reason: HOSPADM

## 2024-04-22 RX ORDER — ATENOLOL 25 MG/1
25 TABLET ORAL EVERY MORNING
Status: DISCONTINUED | OUTPATIENT
Start: 2024-04-23 | End: 2024-04-25 | Stop reason: HOSPADM

## 2024-04-22 RX ORDER — INSULIN LISPRO 100 [IU]/ML
0-4 INJECTION, SOLUTION INTRAVENOUS; SUBCUTANEOUS NIGHTLY
Status: DISCONTINUED | OUTPATIENT
Start: 2024-04-22 | End: 2024-04-25 | Stop reason: HOSPADM

## 2024-04-22 RX ORDER — ONDANSETRON 2 MG/ML
4 INJECTION INTRAMUSCULAR; INTRAVENOUS EVERY 6 HOURS PRN
Status: DISCONTINUED | OUTPATIENT
Start: 2024-04-22 | End: 2024-04-23 | Stop reason: SDUPTHER

## 2024-04-22 RX ORDER — ALBUTEROL SULFATE 2.5 MG/3ML
2.5 SOLUTION RESPIRATORY (INHALATION)
Status: DISCONTINUED | OUTPATIENT
Start: 2024-04-22 | End: 2024-04-22

## 2024-04-22 RX ORDER — PAROXETINE 10 MG/1
10 TABLET, FILM COATED ORAL EVERY MORNING
Status: DISCONTINUED | OUTPATIENT
Start: 2024-04-23 | End: 2024-04-25 | Stop reason: HOSPADM

## 2024-04-22 RX ORDER — POTASSIUM CHLORIDE 20 MEQ/1
40 TABLET, EXTENDED RELEASE ORAL PRN
Status: DISCONTINUED | OUTPATIENT
Start: 2024-04-22 | End: 2024-04-25 | Stop reason: HOSPADM

## 2024-04-22 RX ORDER — MAGNESIUM SULFATE IN WATER 40 MG/ML
2000 INJECTION, SOLUTION INTRAVENOUS PRN
Status: DISCONTINUED | OUTPATIENT
Start: 2024-04-22 | End: 2024-04-25 | Stop reason: HOSPADM

## 2024-04-22 RX ORDER — MORPHINE SULFATE 4 MG/ML
4 INJECTION, SOLUTION INTRAMUSCULAR; INTRAVENOUS ONCE
Status: COMPLETED | OUTPATIENT
Start: 2024-04-22 | End: 2024-04-22

## 2024-04-22 RX ORDER — CYCLOBENZAPRINE HCL 10 MG
10 TABLET ORAL 2 TIMES DAILY PRN
Status: DISCONTINUED | OUTPATIENT
Start: 2024-04-22 | End: 2024-04-23

## 2024-04-22 RX ORDER — ACETAMINOPHEN 325 MG/1
650 TABLET ORAL EVERY 6 HOURS PRN
Status: DISCONTINUED | OUTPATIENT
Start: 2024-04-22 | End: 2024-04-25 | Stop reason: HOSPADM

## 2024-04-22 RX ORDER — ACETAMINOPHEN 650 MG/1
650 SUPPOSITORY RECTAL EVERY 6 HOURS PRN
Status: DISCONTINUED | OUTPATIENT
Start: 2024-04-22 | End: 2024-04-25 | Stop reason: HOSPADM

## 2024-04-22 RX ORDER — DEXAMETHASONE SODIUM PHOSPHATE 10 MG/ML
10 INJECTION INTRAMUSCULAR; INTRAVENOUS ONCE
Status: COMPLETED | OUTPATIENT
Start: 2024-04-22 | End: 2024-04-22

## 2024-04-22 RX ORDER — ONDANSETRON 4 MG/1
4 TABLET, ORALLY DISINTEGRATING ORAL EVERY 8 HOURS PRN
Status: DISCONTINUED | OUTPATIENT
Start: 2024-04-22 | End: 2024-04-25 | Stop reason: HOSPADM

## 2024-04-22 RX ORDER — SODIUM CHLORIDE 0.9 % (FLUSH) 0.9 %
5-40 SYRINGE (ML) INJECTION EVERY 12 HOURS SCHEDULED
Status: DISCONTINUED | OUTPATIENT
Start: 2024-04-22 | End: 2024-04-25 | Stop reason: HOSPADM

## 2024-04-22 RX ORDER — SODIUM CHLORIDE 9 MG/ML
INJECTION, SOLUTION INTRAVENOUS PRN
Status: DISCONTINUED | OUTPATIENT
Start: 2024-04-22 | End: 2024-04-25 | Stop reason: HOSPADM

## 2024-04-22 RX ORDER — SODIUM CHLORIDE 9 MG/ML
INJECTION, SOLUTION INTRAVENOUS CONTINUOUS
Status: DISCONTINUED | OUTPATIENT
Start: 2024-04-22 | End: 2024-04-23

## 2024-04-22 RX ORDER — PANTOPRAZOLE SODIUM 40 MG/1
40 TABLET, DELAYED RELEASE ORAL EVERY MORNING
Status: DISCONTINUED | OUTPATIENT
Start: 2024-04-23 | End: 2024-04-25 | Stop reason: HOSPADM

## 2024-04-22 RX ORDER — MELOXICAM 7.5 MG/1
7.5 TABLET ORAL DAILY
Status: DISCONTINUED | OUTPATIENT
Start: 2024-04-23 | End: 2024-04-25 | Stop reason: HOSPADM

## 2024-04-22 RX ORDER — INSULIN LISPRO 100 [IU]/ML
0-4 INJECTION, SOLUTION INTRAVENOUS; SUBCUTANEOUS
Status: DISCONTINUED | OUTPATIENT
Start: 2024-04-23 | End: 2024-04-25 | Stop reason: HOSPADM

## 2024-04-22 RX ORDER — LORAZEPAM 2 MG/ML
1 INJECTION INTRAMUSCULAR ONCE
Status: COMPLETED | OUTPATIENT
Start: 2024-04-22 | End: 2024-04-23

## 2024-04-22 RX ORDER — UBIDECARENONE 75 MG
100 CAPSULE ORAL DAILY
Status: DISCONTINUED | OUTPATIENT
Start: 2024-04-23 | End: 2024-04-25 | Stop reason: HOSPADM

## 2024-04-22 RX ORDER — HYDROCHLOROTHIAZIDE 25 MG/1
25 TABLET ORAL DAILY
Status: DISCONTINUED | OUTPATIENT
Start: 2024-04-23 | End: 2024-04-25 | Stop reason: HOSPADM

## 2024-04-22 RX ORDER — ENOXAPARIN SODIUM 100 MG/ML
30 INJECTION SUBCUTANEOUS 2 TIMES DAILY
Status: DISCONTINUED | OUTPATIENT
Start: 2024-04-22 | End: 2024-04-25 | Stop reason: HOSPADM

## 2024-04-22 RX ORDER — ALBUTEROL SULFATE 90 UG/1
2 AEROSOL, METERED RESPIRATORY (INHALATION)
Status: DISCONTINUED | OUTPATIENT
Start: 2024-04-22 | End: 2024-04-22 | Stop reason: SDUPTHER

## 2024-04-22 RX ORDER — POLYETHYLENE GLYCOL 3350 17 G/17G
17 POWDER, FOR SOLUTION ORAL DAILY PRN
Status: DISCONTINUED | OUTPATIENT
Start: 2024-04-22 | End: 2024-04-25 | Stop reason: HOSPADM

## 2024-04-22 RX ORDER — ONDANSETRON 2 MG/ML
INJECTION INTRAMUSCULAR; INTRAVENOUS
Status: COMPLETED
Start: 2024-04-22 | End: 2024-04-22

## 2024-04-22 RX ORDER — CHOLECALCIFEROL (VITAMIN D3) 50 MCG
5000 TABLET ORAL DAILY
Status: DISCONTINUED | OUTPATIENT
Start: 2024-04-23 | End: 2024-04-25 | Stop reason: HOSPADM

## 2024-04-22 RX ADMIN — FAMOTIDINE 20 MG: 20 TABLET ORAL at 22:25

## 2024-04-22 RX ADMIN — ONDANSETRON 4 MG: 2 INJECTION INTRAMUSCULAR; INTRAVENOUS at 23:18

## 2024-04-22 RX ADMIN — ONDANSETRON 4 MG: 2 INJECTION INTRAMUSCULAR; INTRAVENOUS at 15:38

## 2024-04-22 RX ADMIN — MORPHINE SULFATE 4 MG: 4 INJECTION, SOLUTION INTRAMUSCULAR; INTRAVENOUS at 23:17

## 2024-04-22 RX ADMIN — MORPHINE SULFATE 4 MG: 4 INJECTION, SOLUTION INTRAMUSCULAR; INTRAVENOUS at 15:38

## 2024-04-22 RX ADMIN — DEXAMETHASONE SODIUM PHOSPHATE 10 MG: 10 INJECTION INTRAMUSCULAR; INTRAVENOUS at 17:05

## 2024-04-22 RX ADMIN — SODIUM CHLORIDE: 9 INJECTION, SOLUTION INTRAVENOUS at 22:12

## 2024-04-22 RX ADMIN — ENOXAPARIN SODIUM 30 MG: 100 INJECTION SUBCUTANEOUS at 22:24

## 2024-04-22 RX ADMIN — SODIUM CHLORIDE, PRESERVATIVE FREE 10 ML: 5 INJECTION INTRAVENOUS at 22:11

## 2024-04-22 ASSESSMENT — PAIN SCALES - GENERAL
PAINLEVEL_OUTOF10: 2
PAINLEVEL_OUTOF10: 2
PAINLEVEL_OUTOF10: 9
PAINLEVEL_OUTOF10: 10

## 2024-04-22 ASSESSMENT — PAIN DESCRIPTION - ONSET
ONSET: ON-GOING
ONSET: ON-GOING

## 2024-04-22 ASSESSMENT — PAIN DESCRIPTION - PAIN TYPE
TYPE: ACUTE PAIN

## 2024-04-22 ASSESSMENT — PAIN DESCRIPTION - FREQUENCY
FREQUENCY: CONTINUOUS

## 2024-04-22 ASSESSMENT — PAIN DESCRIPTION - LOCATION
LOCATION: LEG
LOCATION: HIP;LEG
LOCATION: LEG

## 2024-04-22 ASSESSMENT — PAIN SCALES - WONG BAKER: WONGBAKER_NUMERICALRESPONSE: HURTS A LITTLE BIT

## 2024-04-22 ASSESSMENT — PAIN - FUNCTIONAL ASSESSMENT
PAIN_FUNCTIONAL_ASSESSMENT: PREVENTS OR INTERFERES SOME ACTIVE ACTIVITIES AND ADLS
PAIN_FUNCTIONAL_ASSESSMENT: 0-10
PAIN_FUNCTIONAL_ASSESSMENT: INTOLERABLE, UNABLE TO DO ANY ACTIVE OR PASSIVE ACTIVITIES
PAIN_FUNCTIONAL_ASSESSMENT: PREVENTS OR INTERFERES SOME ACTIVE ACTIVITIES AND ADLS

## 2024-04-22 ASSESSMENT — PAIN DESCRIPTION - ORIENTATION
ORIENTATION: RIGHT

## 2024-04-22 NOTE — ED PROVIDER NOTES
HPI:  4/22/24, Time: 3:56 PM EDT         Melisa Don is a 59 y.o. female presenting to the ED for acute back pain radiating down her right leg beginning 1 week ago.  No falls or trauma.  Pain is radiating into her right hip.  No heavy lifting or inciting events.  Pain is located in her right lower back and buttocks radiating down her leg, worsened with movement, severe.  No bowel or bladder incontinence or retention, saddle anesthesia, fevers, or lower extremity numbness or weakness.  Followed up with her doctor last week who placed her on a Medrol Dosepak, muscle relaxant, lidocaine patch, and ibuprofen which she has been taking without relief.  No chest pain, shortness of breath.  Reports nausea but no emesis.    --------------------------------------------- PAST HISTORY ---------------------------------------------  Past Medical History:  has a past medical history of Diabetes mellitus (HCC), GERD (gastroesophageal reflux disease), Hypertension, IBS (irritable bowel syndrome), Prolonged emergence from general anesthesia, and Whooping cough.    Past Surgical History:  has a past surgical history that includes Cholecystectomy (11/11/2011); Partial hysterectomy; Heel spur surgery (Left, 12/2016); joint replacement (Bilateral, 09/2017); joint replacement (Bilateral, 03/2017); Foot surgery (Right, 03/2019); Finger trigger release (Left, 12/28/2022); and Shoulder arthroscopy (Right, 3/1/2023).    Social History:  reports that she has never smoked. She has never used smokeless tobacco. She reports that she does not drink alcohol and does not use drugs.    Family History: family history includes Alzheimer's Disease in her father; Emphysema in her mother; Heart Disease in her mother; Other in an other family member.     The patient’s home medications have been reviewed.    Allergies: Demerol hcl [meperidine] and Norco [hydrocodone-acetaminophen]    -------------------------------------------------- RESULTS

## 2024-04-22 NOTE — ED NOTES
ED to Inpatient Handoff Report    Notified 7S that electronic handoff available and patient ready for transport to room 729.    Safety Risks: None identified    Patient in Restraints: no    Constant Observer or Patient : no    Telemetry Monitoring Ordered NO            Order to transfer to unit without monitor:YES    Last MEWS: 1 Time completed: 1930    Deterioration Index Score:   Predictive Model Details          19 (Normal)  Factor Value    Calculated 4/22/2024 19:34 63% Age 59 years old    Deterioration Index Model 12% WBC count 11.2 k/uL     9% Systolic 140     7% Potassium 4.2 mmol/L     6% BUN abnormal (24 mg/dL)     2% Hematocrit 47.1 %     1% Pulse oximetry 98 %     0% Temperature 98 °F (36.7 °C)     0% Respiratory rate 16     0% Sodium 139 mmol/L     0% Pulse 72        Vitals:    04/22/24 1421 04/22/24 1541 04/22/24 1934   BP: 113/89 126/76 (!) 140/76   Pulse: 68  72   Resp: 16  16   Temp: 97.9 °F (36.6 °C)  98 °F (36.7 °C)   TempSrc: Oral     SpO2: 99%  98%   Weight: 123.4 kg (272 lb)     Height: 1.702 m (5' 7\")           Opportunity for questions and clarification was provided.

## 2024-04-23 ENCOUNTER — APPOINTMENT (OUTPATIENT)
Dept: MRI IMAGING | Age: 59
End: 2024-04-23
Payer: COMMERCIAL

## 2024-04-23 LAB
ANION GAP SERPL CALCULATED.3IONS-SCNC: 9 MMOL/L (ref 7–16)
BASOPHILS # BLD: 0.01 K/UL (ref 0–0.2)
BASOPHILS NFR BLD: 0 % (ref 0–2)
BUN SERPL-MCNC: 22 MG/DL (ref 6–23)
CALCIUM SERPL-MCNC: 9.1 MG/DL (ref 8.6–10.2)
CHLORIDE SERPL-SCNC: 106 MMOL/L (ref 98–107)
CO2 SERPL-SCNC: 24 MMOL/L (ref 22–29)
CREAT SERPL-MCNC: 0.9 MG/DL (ref 0.5–1)
EOSINOPHIL # BLD: 0 K/UL (ref 0.05–0.5)
EOSINOPHILS RELATIVE PERCENT: 0 % (ref 0–6)
ERYTHROCYTE [DISTWIDTH] IN BLOOD BY AUTOMATED COUNT: 15.5 % (ref 11.5–15)
GFR SERPL CREATININE-BSD FRML MDRD: 76 ML/MIN/1.73M2
GLUCOSE BLD-MCNC: 122 MG/DL (ref 74–99)
GLUCOSE BLD-MCNC: 186 MG/DL (ref 74–99)
GLUCOSE BLD-MCNC: 215 MG/DL (ref 74–99)
GLUCOSE SERPL-MCNC: 167 MG/DL (ref 74–99)
HCT VFR BLD AUTO: 46.8 % (ref 34–48)
HGB BLD-MCNC: 13.8 G/DL (ref 11.5–15.5)
IMM GRANULOCYTES # BLD AUTO: 0.04 K/UL (ref 0–0.58)
IMM GRANULOCYTES NFR BLD: 0 % (ref 0–5)
LYMPHOCYTES NFR BLD: 1.16 K/UL (ref 1.5–4)
LYMPHOCYTES RELATIVE PERCENT: 11 % (ref 20–42)
MCH RBC QN AUTO: 27.8 PG (ref 26–35)
MCHC RBC AUTO-ENTMCNC: 29.5 G/DL (ref 32–34.5)
MCV RBC AUTO: 94.2 FL (ref 80–99.9)
MONOCYTES NFR BLD: 0.31 K/UL (ref 0.1–0.95)
MONOCYTES NFR BLD: 3 % (ref 2–12)
NEUTROPHILS NFR BLD: 86 % (ref 43–80)
NEUTS SEG NFR BLD: 9.41 K/UL (ref 1.8–7.3)
PLATELET # BLD AUTO: 221 K/UL (ref 130–450)
PMV BLD AUTO: 10.3 FL (ref 7–12)
POTASSIUM SERPL-SCNC: 5 MMOL/L (ref 3.5–5)
RBC # BLD AUTO: 4.97 M/UL (ref 3.5–5.5)
SODIUM SERPL-SCNC: 139 MMOL/L (ref 132–146)
WBC OTHER # BLD: 10.9 K/UL (ref 4.5–11.5)

## 2024-04-23 PROCEDURE — 72148 MRI LUMBAR SPINE W/O DYE: CPT

## 2024-04-23 PROCEDURE — 1200000000 HC SEMI PRIVATE

## 2024-04-23 PROCEDURE — 6370000000 HC RX 637 (ALT 250 FOR IP): Performed by: FAMILY MEDICINE

## 2024-04-23 PROCEDURE — 6360000002 HC RX W HCPCS: Performed by: FAMILY MEDICINE

## 2024-04-23 PROCEDURE — 36415 COLL VENOUS BLD VENIPUNCTURE: CPT

## 2024-04-23 PROCEDURE — 6370000000 HC RX 637 (ALT 250 FOR IP)

## 2024-04-23 PROCEDURE — 6360000002 HC RX W HCPCS

## 2024-04-23 PROCEDURE — 80048 BASIC METABOLIC PNL TOTAL CA: CPT

## 2024-04-23 PROCEDURE — 2580000003 HC RX 258

## 2024-04-23 PROCEDURE — 82962 GLUCOSE BLOOD TEST: CPT

## 2024-04-23 PROCEDURE — 85025 COMPLETE CBC W/AUTO DIFF WBC: CPT

## 2024-04-23 RX ORDER — ORPHENADRINE CITRATE 30 MG/ML
60 INJECTION INTRAMUSCULAR; INTRAVENOUS EVERY 12 HOURS
Status: DISCONTINUED | OUTPATIENT
Start: 2024-04-23 | End: 2024-04-25 | Stop reason: HOSPADM

## 2024-04-23 RX ORDER — MORPHINE SULFATE 4 MG/ML
4 INJECTION, SOLUTION INTRAMUSCULAR; INTRAVENOUS ONCE
Status: COMPLETED | OUTPATIENT
Start: 2024-04-23 | End: 2024-04-23

## 2024-04-23 RX ORDER — MORPHINE SULFATE 2 MG/ML
2 INJECTION, SOLUTION INTRAMUSCULAR; INTRAVENOUS EVERY 4 HOURS PRN
Status: DISCONTINUED | OUTPATIENT
Start: 2024-04-23 | End: 2024-04-25 | Stop reason: HOSPADM

## 2024-04-23 RX ORDER — DEXAMETHASONE SODIUM PHOSPHATE 10 MG/ML
6 INJECTION INTRAMUSCULAR; INTRAVENOUS EVERY 24 HOURS
Status: DISCONTINUED | OUTPATIENT
Start: 2024-04-23 | End: 2024-04-24

## 2024-04-23 RX ORDER — OXYCODONE HYDROCHLORIDE AND ACETAMINOPHEN 5; 325 MG/1; MG/1
1 TABLET ORAL EVERY 4 HOURS PRN
Status: DISCONTINUED | OUTPATIENT
Start: 2024-04-23 | End: 2024-04-25 | Stop reason: HOSPADM

## 2024-04-23 RX ADMIN — MORPHINE SULFATE 4 MG: 4 INJECTION, SOLUTION INTRAMUSCULAR; INTRAVENOUS at 09:23

## 2024-04-23 RX ADMIN — SODIUM CHLORIDE, PRESERVATIVE FREE 10 ML: 5 INJECTION INTRAVENOUS at 10:38

## 2024-04-23 RX ADMIN — INSULIN LISPRO 1 UNITS: 100 INJECTION, SOLUTION INTRAVENOUS; SUBCUTANEOUS at 18:18

## 2024-04-23 RX ADMIN — ONDANSETRON 4 MG: 2 INJECTION INTRAMUSCULAR; INTRAVENOUS at 19:41

## 2024-04-23 RX ADMIN — Medication 5000 UNITS: at 08:45

## 2024-04-23 RX ADMIN — DEXAMETHASONE SODIUM PHOSPHATE 6 MG: 10 INJECTION INTRAMUSCULAR; INTRAVENOUS at 10:38

## 2024-04-23 RX ADMIN — ATENOLOL 25 MG: 25 TABLET ORAL at 08:45

## 2024-04-23 RX ADMIN — ENOXAPARIN SODIUM 30 MG: 100 INJECTION SUBCUTANEOUS at 08:46

## 2024-04-23 RX ADMIN — ONDANSETRON 4 MG: 2 INJECTION INTRAMUSCULAR; INTRAVENOUS at 09:20

## 2024-04-23 RX ADMIN — HYDROCHLOROTHIAZIDE 25 MG: 25 TABLET ORAL at 08:45

## 2024-04-23 RX ADMIN — ORPHENADRINE CITRATE 60 MG: 30 INJECTION, SOLUTION INTRAMUSCULAR; INTRAVENOUS at 17:13

## 2024-04-23 RX ADMIN — PANTOPRAZOLE SODIUM 40 MG: 40 TABLET, DELAYED RELEASE ORAL at 08:45

## 2024-04-23 RX ADMIN — POLYETHYLENE GLYCOL 3350 17 G: 17 POWDER, FOR SOLUTION ORAL at 10:38

## 2024-04-23 RX ADMIN — MELOXICAM 7.5 MG: 7.5 TABLET ORAL at 08:45

## 2024-04-23 RX ADMIN — FAMOTIDINE 20 MG: 20 TABLET ORAL at 21:57

## 2024-04-23 RX ADMIN — MORPHINE SULFATE 2 MG: 2 INJECTION, SOLUTION INTRAMUSCULAR; INTRAVENOUS at 19:41

## 2024-04-23 RX ADMIN — ENOXAPARIN SODIUM 30 MG: 100 INJECTION SUBCUTANEOUS at 21:57

## 2024-04-23 RX ADMIN — LORAZEPAM 1 MG: 2 INJECTION INTRAMUSCULAR; INTRAVENOUS at 16:23

## 2024-04-23 RX ADMIN — OXYCODONE HYDROCHLORIDE AND ACETAMINOPHEN 1 TABLET: 5; 325 TABLET ORAL at 23:36

## 2024-04-23 RX ADMIN — Medication 100 MCG: at 08:45

## 2024-04-23 RX ADMIN — OXYCODONE HYDROCHLORIDE AND ACETAMINOPHEN 1 TABLET: 5; 325 TABLET ORAL at 15:31

## 2024-04-23 RX ADMIN — PAROXETINE 10 MG: 10 TABLET, FILM COATED ORAL at 08:45

## 2024-04-23 RX ADMIN — SODIUM CHLORIDE, PRESERVATIVE FREE 10 ML: 5 INJECTION INTRAVENOUS at 23:39

## 2024-04-23 ASSESSMENT — PAIN DESCRIPTION - PAIN TYPE
TYPE: ACUTE PAIN

## 2024-04-23 ASSESSMENT — PAIN DESCRIPTION - DESCRIPTORS
DESCRIPTORS: STABBING
DESCRIPTORS: STABBING
DESCRIPTORS: SHARP;ACHING;SORE
DESCRIPTORS: STABBING

## 2024-04-23 ASSESSMENT — PAIN DESCRIPTION - LOCATION
LOCATION: BACK;BUTTOCKS
LOCATION: BACK;HIP

## 2024-04-23 ASSESSMENT — PAIN SCALES - GENERAL
PAINLEVEL_OUTOF10: 4
PAINLEVEL_OUTOF10: 8
PAINLEVEL_OUTOF10: 4
PAINLEVEL_OUTOF10: 7
PAINLEVEL_OUTOF10: 6
PAINLEVEL_OUTOF10: 8
PAINLEVEL_OUTOF10: 2

## 2024-04-23 ASSESSMENT — PAIN - FUNCTIONAL ASSESSMENT
PAIN_FUNCTIONAL_ASSESSMENT: PREVENTS OR INTERFERES SOME ACTIVE ACTIVITIES AND ADLS
PAIN_FUNCTIONAL_ASSESSMENT: ACTIVITIES ARE NOT PREVENTED

## 2024-04-23 ASSESSMENT — PAIN DESCRIPTION - ORIENTATION
ORIENTATION: RIGHT
ORIENTATION: RIGHT;UPPER;LOWER
ORIENTATION: RIGHT

## 2024-04-23 NOTE — HOME CARE
[facility] referral received, awaiting final orders. Will follow.   Fernanda Dao LPN [facility].

## 2024-04-23 NOTE — PROGRESS NOTES
04/22/24 2148   RT Protocol   History Pulmonary Disease 0   Respiratory pattern 0   Breath sounds 0   Cough 0   Indications for Bronchodilator Therapy None   Bronchodilator Assessment Score 0

## 2024-04-23 NOTE — PLAN OF CARE
Problem: Discharge Planning  Goal: Discharge to home or other facility with appropriate resources  4/23/2024 1242 by Rowan Lopes RN  Outcome: Progressing  4/22/2024 2330 by Jerrell Vora, RN  Outcome: Progressing     Problem: Pain  Goal: Verbalizes/displays adequate comfort level or baseline comfort level  4/23/2024 1242 by Rowan Lopes RN  Outcome: Progressing  4/22/2024 2330 by Jerrell Vora, RN  Outcome: Progressing     Problem: Safety - Adult  Goal: Free from fall injury  4/23/2024 1242 by Rowan Lopes, RN  Outcome: Progressing  4/22/2024 2330 by Jerrell Vora, RN  Outcome: Progressing

## 2024-04-23 NOTE — H&P
[facility]  History and Physical      CHIEF COMPLAINT:    Chief Complaint   Patient presents with    Hip Pain     RIGHT HIP pain, on muscle relaxer and steroids, lidocaine patch and ibuprofen for sciatic pain. Felt pop when tried to stand up. Lowered self to floor. Denies fall.         Patient of Jason Bazan DO presents with:  Lumbar herniated disc    History of Present Illness:   Patient is a 59-year-old female with a past medical history of DM, GERD, hypertension, IBS who presents to the emergency for right hip pain.  Patient was being treated outpatient with muscle relaxers and steroids when she states she stood up and felt a pop and had to lower herself to the floor prompting her to come emergency room for further evaluation.  On arrival to emergency patient had a CT lumbar spine revealing grade 1 anterolisthesis of L4 on L5 due to facet arthropathy with Central herniation of disc material at L4-5 of uncertain age.  Labs are unremarkable on arrival.  Patient is admitted to intermediate for further workup and treatment.      REVIEW OF SYSTEMS:  Pertinent negatives are above in HPI.  10 point ROS otherwise negative.      Past Medical History:   Diagnosis Date    Diabetes mellitus (HCC)     GERD (gastroesophageal reflux disease)     Hypertension     IBS (irritable bowel syndrome)     Prolonged emergence from general anesthesia     Whooping cough 2020         Past Surgical History:   Procedure Laterality Date    CHOLECYSTECTOMY  11/11/2011    FINGER TRIGGER RELEASE Left 12/28/2022    LEFT THUMB TRIGGER  RELEASE LEFT INDEX AND MIDDLE FINGER TRIGGER RELEASE LEFT CARPAL TUNNEL RELEASE performed by Dami Martinez MD at [facility] OR    FOOT SURGERY Right 03/2019    plantar    HEEL SPUR SURGERY Left 12/2016    JOINT REPLACEMENT Bilateral 09/2017    Great toe joints    JOINT REPLACEMENT Bilateral 03/2017    both big toes    PARTIAL HYSTERECTOMY (CERVIX NOT REMOVED)      SHOULDER ARTHROSCOPY Right  with interdisciplinary teams, face to face encounter with patient, providing counseling/education to patient/family.      Electronically signed by Karissa Koenig DO on 4/23/2024 at 11:24 AM

## 2024-04-23 NOTE — RT PROTOCOL NOTE
RT Nebulizer Bronchodilator Protocol Note    There is a bronchodilator order in the chart from a provider indicating to follow the RT Bronchodilator Protocol and there is an “Initiate RT Bronchodilator Protocol” order as well (see protocol at bottom of note).    CXR Findings:  No results found.    The findings from the last RT Protocol Assessment were as follows:  Smoking: None or smoker <15 pack years  Respiratory Pattern: Regular pattern and RR 12-20 bpm  Breath Sounds: Clear breath sounds  Cough: Strong, spontaneous, non-productive  Indication for Bronchodilator Therapy: None  Bronchodilator Assessment Score: 0    Aerosolized bronchodilator medication orders have been revised according to the RT Nebulizer Bronchodilator Protocol below.    Respiratory Therapist to perform RT Therapy Protocol Assessment initially then follow the protocol.  Repeat RT Therapy Protocol Assessment PRN for score 0-3 or on second treatment, BID, and PRN for scores above 3.    No Indications - adjust the frequency to every 6 hours PRN wheezing or bronchospasm, if no treatments needed after 48 hours then discontinue using Per Protocol order mode.     If indication present, adjust the RT bronchodilator orders based on the Bronchodilator Assessment Score as indicated below.  If a patient is on this medication at home then do not decrease Frequency below that used at home.    0-3 - enter or revise RT bronchodilator order(s) to equivalent RT Bronchodilator order with Frequency of every 4 hours PRN for wheezing or increased work of breathing using Per Protocol order mode.       4-6 - enter or revise RT Bronchodilator order(s) to two equivalent RT bronchodilator orders with one order with BID Frequency and one order with Frequency of every 4 hours PRN wheezing or increased work of breathing using Per Protocol order mode.         7-10 - enter or revise RT Bronchodilator order(s) to two equivalent RT bronchodilator orders with one order with TID

## 2024-04-23 NOTE — PROGRESS NOTES
4 Eyes Skin Assessment     NAME:  Melisa Don  YOB: 1965  MEDICAL RECORD NUMBER:  [id number]    The patient is being assessed for  Admission    I agree that at least one RN has performed a thorough Head to Toe Skin Assessment on the patient. ALL assessment sites listed below have been assessed.      Areas assessed by both nurses:    Head, Face, Ears, Shoulders, Back, Chest, Arms, Elbows, Hands, Sacrum. Buttock, Coccyx, Ischium, and Legs. Feet and Heels        Does the Patient have a Wound? No noted wound(s)       Tor Prevention initiated by RN: No  Wound Care Orders initiated by RN: No    Pressure Injury (Stage 3,4, Unstageable, DTI, NWPT, and Complex wounds) if present, place Wound referral order by RN under : No    New Ostomies, if present place, Ostomy referral order under : No     Nurse 1 eSignature: Electronically signed by Jerrell Wise RN on 4/22/24 at 11:31 PM EDT    **SHARE this note so that the co-signing nurse can place an eSignature**    Nurse 2 eSignature: Electronically signed by Linda Reeder RN on 4/22/24 at 11:32 PM EDT

## 2024-04-23 NOTE — PROGRESS NOTES
Date:2024  Patient Name: Melisa Don  MRN: [id number]  : 1965  ROOM #: [id number]/[id number]    Occupational Therapy order received, chart reviewed and evaluation attempted this date. Patient is unavailable for OT evaluation due to awaiting results of lumbar spine MRI. Will re-attempt OT evaluation at a later time.     Daphnie Torrez OTR/L #[id number]

## 2024-04-23 NOTE — CARE COORDINATION
Met with patient about diagnosis and discharge plan. Pt admit for c/o right hip pain.  noted lumbar herniation L4-5. MRI of spine ordered. Ortho spine consult pending. Pt lives with spouse in 2 story home, bed and bath on 2nd floor. 13 steps with rails. Pt has wheeled walker and cane at home. Pending consult and therapies. Tentative referral called to [facility] for therapy. PCP is Dr Bazan. Will follow-o

## 2024-04-24 ENCOUNTER — APPOINTMENT (OUTPATIENT)
Dept: MRI IMAGING | Age: 59
End: 2024-04-24
Payer: COMMERCIAL

## 2024-04-24 ENCOUNTER — APPOINTMENT (OUTPATIENT)
Dept: GENERAL RADIOLOGY | Age: 59
End: 2024-04-24
Payer: COMMERCIAL

## 2024-04-24 LAB
ANION GAP SERPL CALCULATED.3IONS-SCNC: 7 MMOL/L (ref 7–16)
BASOPHILS # BLD: 0.03 K/UL (ref 0–0.2)
BASOPHILS NFR BLD: 0 % (ref 0–2)
BUN SERPL-MCNC: 24 MG/DL (ref 6–23)
CALCIUM SERPL-MCNC: 8.8 MG/DL (ref 8.6–10.2)
CHLORIDE SERPL-SCNC: 102 MMOL/L (ref 98–107)
CO2 SERPL-SCNC: 27 MMOL/L (ref 22–29)
CREAT SERPL-MCNC: 0.9 MG/DL (ref 0.5–1)
EOSINOPHIL # BLD: 0.04 K/UL (ref 0.05–0.5)
EOSINOPHILS RELATIVE PERCENT: 0 % (ref 0–6)
ERYTHROCYTE [DISTWIDTH] IN BLOOD BY AUTOMATED COUNT: 15.4 % (ref 11.5–15)
GFR SERPL CREATININE-BSD FRML MDRD: 72 ML/MIN/1.73M2
GLUCOSE BLD-MCNC: 130 MG/DL (ref 74–99)
GLUCOSE BLD-MCNC: 175 MG/DL (ref 74–99)
GLUCOSE BLD-MCNC: 235 MG/DL (ref 74–99)
GLUCOSE BLD-MCNC: 260 MG/DL (ref 74–99)
GLUCOSE SERPL-MCNC: 177 MG/DL (ref 74–99)
HCT VFR BLD AUTO: 44.4 % (ref 34–48)
HGB BLD-MCNC: 13.4 G/DL (ref 11.5–15.5)
IMM GRANULOCYTES # BLD AUTO: 0.04 K/UL (ref 0–0.58)
IMM GRANULOCYTES NFR BLD: 0 % (ref 0–5)
LYMPHOCYTES NFR BLD: 2.52 K/UL (ref 1.5–4)
LYMPHOCYTES RELATIVE PERCENT: 22 % (ref 20–42)
MCH RBC QN AUTO: 28 PG (ref 26–35)
MCHC RBC AUTO-ENTMCNC: 30.2 G/DL (ref 32–34.5)
MCV RBC AUTO: 92.7 FL (ref 80–99.9)
MONOCYTES NFR BLD: 0.75 K/UL (ref 0.1–0.95)
MONOCYTES NFR BLD: 7 % (ref 2–12)
NEUTROPHILS NFR BLD: 71 % (ref 43–80)
NEUTS SEG NFR BLD: 8.18 K/UL (ref 1.8–7.3)
PLATELET # BLD AUTO: 207 K/UL (ref 130–450)
PMV BLD AUTO: 10.7 FL (ref 7–12)
POTASSIUM SERPL-SCNC: 4.3 MMOL/L (ref 3.5–5)
RBC # BLD AUTO: 4.79 M/UL (ref 3.5–5.5)
SODIUM SERPL-SCNC: 136 MMOL/L (ref 132–146)
WBC OTHER # BLD: 11.6 K/UL (ref 4.5–11.5)

## 2024-04-24 PROCEDURE — 6360000002 HC RX W HCPCS: Performed by: ORTHOPAEDIC SURGERY

## 2024-04-24 PROCEDURE — 6370000000 HC RX 637 (ALT 250 FOR IP): Performed by: FAMILY MEDICINE

## 2024-04-24 PROCEDURE — 72149 MRI LUMBAR SPINE W/DYE: CPT

## 2024-04-24 PROCEDURE — 85025 COMPLETE CBC W/AUTO DIFF WBC: CPT

## 2024-04-24 PROCEDURE — 82962 GLUCOSE BLOOD TEST: CPT

## 2024-04-24 PROCEDURE — 6360000002 HC RX W HCPCS: Performed by: FAMILY MEDICINE

## 2024-04-24 PROCEDURE — 6360000004 HC RX CONTRAST MEDICATION: Performed by: RADIOLOGY

## 2024-04-24 PROCEDURE — 2580000003 HC RX 258

## 2024-04-24 PROCEDURE — 72100 X-RAY EXAM L-S SPINE 2/3 VWS: CPT

## 2024-04-24 PROCEDURE — 1200000000 HC SEMI PRIVATE

## 2024-04-24 PROCEDURE — A9579 GAD-BASE MR CONTRAST NOS,1ML: HCPCS | Performed by: RADIOLOGY

## 2024-04-24 PROCEDURE — 6360000002 HC RX W HCPCS

## 2024-04-24 PROCEDURE — 80048 BASIC METABOLIC PNL TOTAL CA: CPT

## 2024-04-24 PROCEDURE — 36415 COLL VENOUS BLD VENIPUNCTURE: CPT

## 2024-04-24 PROCEDURE — 72120 X-RAY BEND ONLY L-S SPINE: CPT

## 2024-04-24 PROCEDURE — 6370000000 HC RX 637 (ALT 250 FOR IP)

## 2024-04-24 RX ORDER — DEXAMETHASONE SODIUM PHOSPHATE 4 MG/ML
4 INJECTION, SOLUTION INTRA-ARTICULAR; INTRALESIONAL; INTRAMUSCULAR; INTRAVENOUS; SOFT TISSUE EVERY 6 HOURS
Status: COMPLETED | OUTPATIENT
Start: 2024-04-24 | End: 2024-04-25

## 2024-04-24 RX ORDER — ALPRAZOLAM 1 MG/1
1 TABLET ORAL ONCE
Status: COMPLETED | OUTPATIENT
Start: 2024-04-24 | End: 2024-04-24

## 2024-04-24 RX ORDER — KETOROLAC TROMETHAMINE 15 MG/ML
30 INJECTION, SOLUTION INTRAMUSCULAR; INTRAVENOUS EVERY 6 HOURS
Status: DISCONTINUED | OUTPATIENT
Start: 2024-04-24 | End: 2024-04-25 | Stop reason: HOSPADM

## 2024-04-24 RX ADMIN — ENOXAPARIN SODIUM 30 MG: 100 INJECTION SUBCUTANEOUS at 08:57

## 2024-04-24 RX ADMIN — MELOXICAM 7.5 MG: 7.5 TABLET ORAL at 09:29

## 2024-04-24 RX ADMIN — KETOROLAC TROMETHAMINE 30 MG: 15 INJECTION, SOLUTION INTRAMUSCULAR; INTRAVENOUS at 13:40

## 2024-04-24 RX ADMIN — DEXAMETHASONE SODIUM PHOSPHATE 4 MG: 4 INJECTION INTRA-ARTICULAR; INTRALESIONAL; INTRAMUSCULAR; INTRAVENOUS; SOFT TISSUE at 18:33

## 2024-04-24 RX ADMIN — SODIUM CHLORIDE, PRESERVATIVE FREE 10 ML: 5 INJECTION INTRAVENOUS at 21:16

## 2024-04-24 RX ADMIN — PANTOPRAZOLE SODIUM 40 MG: 40 TABLET, DELAYED RELEASE ORAL at 09:29

## 2024-04-24 RX ADMIN — SODIUM CHLORIDE, PRESERVATIVE FREE 10 ML: 5 INJECTION INTRAVENOUS at 09:31

## 2024-04-24 RX ADMIN — Medication 100 MCG: at 09:29

## 2024-04-24 RX ADMIN — HYDROCHLOROTHIAZIDE 25 MG: 25 TABLET ORAL at 09:29

## 2024-04-24 RX ADMIN — ENOXAPARIN SODIUM 30 MG: 100 INJECTION SUBCUTANEOUS at 21:11

## 2024-04-24 RX ADMIN — OXYCODONE HYDROCHLORIDE AND ACETAMINOPHEN 1 TABLET: 5; 325 TABLET ORAL at 06:31

## 2024-04-24 RX ADMIN — PAROXETINE 10 MG: 10 TABLET, FILM COATED ORAL at 09:29

## 2024-04-24 RX ADMIN — KETOROLAC TROMETHAMINE 30 MG: 15 INJECTION, SOLUTION INTRAMUSCULAR; INTRAVENOUS at 18:33

## 2024-04-24 RX ADMIN — FAMOTIDINE 20 MG: 20 TABLET ORAL at 09:29

## 2024-04-24 RX ADMIN — ORPHENADRINE CITRATE 60 MG: 30 INJECTION, SOLUTION INTRAMUSCULAR; INTRAVENOUS at 16:53

## 2024-04-24 RX ADMIN — HYDROMORPHONE HYDROCHLORIDE 0.5 MG: 1 INJECTION, SOLUTION INTRAMUSCULAR; INTRAVENOUS; SUBCUTANEOUS at 10:12

## 2024-04-24 RX ADMIN — Medication 5000 UNITS: at 09:29

## 2024-04-24 RX ADMIN — ATENOLOL 25 MG: 25 TABLET ORAL at 09:29

## 2024-04-24 RX ADMIN — OXYCODONE HYDROCHLORIDE AND ACETAMINOPHEN 1 TABLET: 5; 325 TABLET ORAL at 16:53

## 2024-04-24 RX ADMIN — DEXAMETHASONE SODIUM PHOSPHATE 6 MG: 10 INJECTION INTRAMUSCULAR; INTRAVENOUS at 09:30

## 2024-04-24 RX ADMIN — OXYCODONE HYDROCHLORIDE AND ACETAMINOPHEN 1 TABLET: 5; 325 TABLET ORAL at 11:55

## 2024-04-24 RX ADMIN — ORPHENADRINE CITRATE 60 MG: 30 INJECTION, SOLUTION INTRAMUSCULAR; INTRAVENOUS at 05:23

## 2024-04-24 RX ADMIN — GADOTERIDOL 20 ML: 279.3 INJECTION, SOLUTION INTRAVENOUS at 10:58

## 2024-04-24 RX ADMIN — FAMOTIDINE 20 MG: 20 TABLET ORAL at 21:11

## 2024-04-24 RX ADMIN — ALPRAZOLAM 1 MG: 1 TABLET ORAL at 10:12

## 2024-04-24 RX ADMIN — DEXAMETHASONE SODIUM PHOSPHATE 4 MG: 4 INJECTION INTRA-ARTICULAR; INTRALESIONAL; INTRAMUSCULAR; INTRAVENOUS; SOFT TISSUE at 13:40

## 2024-04-24 ASSESSMENT — PAIN DESCRIPTION - PAIN TYPE
TYPE: ACUTE PAIN
TYPE: ACUTE PAIN

## 2024-04-24 ASSESSMENT — PAIN SCALES - GENERAL
PAINLEVEL_OUTOF10: 7
PAINLEVEL_OUTOF10: 4
PAINLEVEL_OUTOF10: 2
PAINLEVEL_OUTOF10: 5
PAINLEVEL_OUTOF10: 5
PAINLEVEL_OUTOF10: 1
PAINLEVEL_OUTOF10: 5
PAINLEVEL_OUTOF10: 0
PAINLEVEL_OUTOF10: 3

## 2024-04-24 ASSESSMENT — PAIN DESCRIPTION - DESCRIPTORS
DESCRIPTORS: ACHING;SHARP
DESCRIPTORS: DISCOMFORT;THROBBING
DESCRIPTORS: ACHING;DISCOMFORT
DESCRIPTORS: ACHING

## 2024-04-24 ASSESSMENT — PAIN DESCRIPTION - ORIENTATION
ORIENTATION: RIGHT
ORIENTATION: LOWER
ORIENTATION: RIGHT

## 2024-04-24 ASSESSMENT — PAIN - FUNCTIONAL ASSESSMENT
PAIN_FUNCTIONAL_ASSESSMENT: ACTIVITIES ARE NOT PREVENTED

## 2024-04-24 ASSESSMENT — PAIN DESCRIPTION - LOCATION
LOCATION: BACK
LOCATION: HIP;BACK
LOCATION: BACK;BUTTOCKS
LOCATION: BACK;BUTTOCKS
LOCATION: HIP;BACK

## 2024-04-24 NOTE — CARE COORDINATION
Updated plan of care. Pending MRI of spine with contrast today. Therapies will work with pt after completed. [facility] for PT following, if needed. WILL NEED HOME CARE ORDERS-jamaro

## 2024-04-24 NOTE — PLAN OF CARE
Problem: Discharge Planning  Goal: Discharge to home or other facility with appropriate resources  4/23/2024 2257 by Jennifer Burris, RN  Outcome: Progressing  4/23/2024 1242 by Rowan Lopes RN  Outcome: Progressing     Problem: Pain  Goal: Verbalizes/displays adequate comfort level or baseline comfort level  4/23/2024 2257 by Jennifer Burris, RN  Outcome: Progressing  4/23/2024 1242 by Rowan Lopes RN  Outcome: Progressing     Problem: Safety - Adult  Goal: Free from fall injury  4/23/2024 2257 by Jennifer Burris, RN  Outcome: Progressing  4/23/2024 1242 by Rowan Lopes RN  Outcome: Progressing

## 2024-04-24 NOTE — PROGRESS NOTES
[facility]   Progress note      Subjective:    The patient is awake and alert.  Just got back from MRI, she is sedate, asks numerous questions, some several times. She is still having pain. Has not been ambulatory since admission. She is agreeable to weight/metabolic management.  No acute events overnight.    Denies chest pain, angina, SOB     Objective:    BP (!) 110/50   Pulse 72   Temp 97.7 °F (36.5 °C) (Oral)   Resp 16   Ht 1.702 m (5' 7\")   Wt 123.4 kg (272 lb)   SpO2 97%   BMI 42.60 kg/m²     No intake/output data recorded.  No intake/output data recorded.    General appearance: NAD, conversant, morbidly obese  Eyes: Sclerae anicteric, PERRLA  HEENT: AT/NC, MMM  Neck: FROM, supple, no thyromegaly  Lymph: No cervical / supraclavicular lymphadenopathy  Lungs: Clear to auscultation, WOB normal  CV: RRR, no MRGs, no lower extremity edema  Abdomen: Soft, non-tender; no masses or HSM, +BS  Extremities: FROM without synovitis.  No clubbing or cyanosis of the hands.  Skin: no rash, induration, lesions, or ulcers  Psych: Calm and cooperative.  Normal judgement and insight.  Normal mood and affect.  Neuro: Alert and interactive, face symmetric, speech fluent.     Recent Labs     04/22/24  1707 04/23/24  0625 04/24/24  0330   WBC 11.2 10.9 11.6*   HGB 14.2 13.8 13.4   HCT 47.1 46.8 44.4    221 207       Recent Labs     04/22/24  1707 04/23/24  0625 04/24/24  0330    139 136   K 4.2 5.0 4.3    106 102   CO2 24 24 27   BUN 24* 22 24*   CREATININE 0.9 0.9 0.9   CALCIUM 8.6 9.1 8.8       Assessment:    Principal Problem:    Lumbar herniated disc  Resolved Problems:    * No resolved hospital problems. *      Plan:    Patient is a 59-year-old female admitted to [facility] for  Lumbar herniated disc  -Monitor labs  -PT OT, hold until after MRI  -MRI lumbar spine pending-->consult ortho spine pending result  -Pain control  -Social work consult for possible rehab needs  -IV Decadron

## 2024-04-24 NOTE — PROGRESS NOTES
Patient had MRI 3/29, patient received, Xanax 0.5 mg PO before MRI . Patient requesting to have this medication for next MRI.

## 2024-04-24 NOTE — PROGRESS NOTES
P Quality Flow/Interdisciplinary Rounds Progress Note        Quality Flow Rounds held on April 24, 2024    Disciplines Attending:  Bedside Nurse, , , and Nursing Unit Leadership    Melisa Don was admitted on 4/22/2024  2:09 PM    Anticipated Discharge Date:       Disposition:    Tor Score:  Tor Scale Score: 21    Readmission Risk              Risk of Unplanned Readmission:  10           Discussed patient goal for the day, patient clinical progression, and barriers to discharge.  The following Goal(s) of the Day/Commitment(s) have been identified:   await repeat MRI, ortho spine eval, PT/OT, discharge planning      Mike Lee RN  April 24, 2024

## 2024-04-25 VITALS
BODY MASS INDEX: 42.69 KG/M2 | OXYGEN SATURATION: 95 % | RESPIRATION RATE: 16 BRPM | SYSTOLIC BLOOD PRESSURE: 131 MMHG | WEIGHT: 272 LBS | DIASTOLIC BLOOD PRESSURE: 93 MMHG | HEIGHT: 67 IN | TEMPERATURE: 97.6 F | HEART RATE: 81 BPM

## 2024-04-25 PROBLEM — M54.16 LUMBAR RADICULOPATHY: Status: ACTIVE | Noted: 2024-04-25

## 2024-04-25 LAB
ANION GAP SERPL CALCULATED.3IONS-SCNC: 12 MMOL/L (ref 7–16)
BASOPHILS # BLD: 0.01 K/UL (ref 0–0.2)
BASOPHILS NFR BLD: 0 % (ref 0–2)
BUN SERPL-MCNC: 29 MG/DL (ref 6–23)
CALCIUM SERPL-MCNC: 9.4 MG/DL (ref 8.6–10.2)
CHLORIDE SERPL-SCNC: 100 MMOL/L (ref 98–107)
CO2 SERPL-SCNC: 25 MMOL/L (ref 22–29)
CREAT SERPL-MCNC: 0.8 MG/DL (ref 0.5–1)
EOSINOPHIL # BLD: 0 K/UL (ref 0.05–0.5)
EOSINOPHILS RELATIVE PERCENT: 0 % (ref 0–6)
ERYTHROCYTE [DISTWIDTH] IN BLOOD BY AUTOMATED COUNT: 15.2 % (ref 11.5–15)
GFR SERPL CREATININE-BSD FRML MDRD: 81 ML/MIN/1.73M2
GLUCOSE BLD-MCNC: 192 MG/DL (ref 74–99)
GLUCOSE BLD-MCNC: 195 MG/DL (ref 74–99)
GLUCOSE BLD-MCNC: 202 MG/DL (ref 74–99)
GLUCOSE SERPL-MCNC: 225 MG/DL (ref 74–99)
HCT VFR BLD AUTO: 48.4 % (ref 34–48)
HGB BLD-MCNC: 14.9 G/DL (ref 11.5–15.5)
IMM GRANULOCYTES # BLD AUTO: 0.03 K/UL (ref 0–0.58)
IMM GRANULOCYTES NFR BLD: 0 % (ref 0–5)
LYMPHOCYTES NFR BLD: 1.11 K/UL (ref 1.5–4)
LYMPHOCYTES RELATIVE PERCENT: 10 % (ref 20–42)
MCH RBC QN AUTO: 27.8 PG (ref 26–35)
MCHC RBC AUTO-ENTMCNC: 30.8 G/DL (ref 32–34.5)
MCV RBC AUTO: 90.3 FL (ref 80–99.9)
MONOCYTES NFR BLD: 0.37 K/UL (ref 0.1–0.95)
MONOCYTES NFR BLD: 3 % (ref 2–12)
NEUTROPHILS NFR BLD: 86 % (ref 43–80)
NEUTS SEG NFR BLD: 9.57 K/UL (ref 1.8–7.3)
PLATELET # BLD AUTO: 258 K/UL (ref 130–450)
PMV BLD AUTO: 10.4 FL (ref 7–12)
POTASSIUM SERPL-SCNC: 4.7 MMOL/L (ref 3.5–5)
RBC # BLD AUTO: 5.36 M/UL (ref 3.5–5.5)
SODIUM SERPL-SCNC: 137 MMOL/L (ref 132–146)
WBC OTHER # BLD: 11.1 K/UL (ref 4.5–11.5)

## 2024-04-25 PROCEDURE — 80048 BASIC METABOLIC PNL TOTAL CA: CPT

## 2024-04-25 PROCEDURE — 6360000002 HC RX W HCPCS: Performed by: ORTHOPAEDIC SURGERY

## 2024-04-25 PROCEDURE — 6360000002 HC RX W HCPCS

## 2024-04-25 PROCEDURE — 85025 COMPLETE CBC W/AUTO DIFF WBC: CPT

## 2024-04-25 PROCEDURE — 2580000003 HC RX 258

## 2024-04-25 PROCEDURE — 82962 GLUCOSE BLOOD TEST: CPT

## 2024-04-25 PROCEDURE — 6360000002 HC RX W HCPCS: Performed by: FAMILY MEDICINE

## 2024-04-25 PROCEDURE — 6370000000 HC RX 637 (ALT 250 FOR IP): Performed by: FAMILY MEDICINE

## 2024-04-25 PROCEDURE — 6370000000 HC RX 637 (ALT 250 FOR IP)

## 2024-04-25 PROCEDURE — 36415 COLL VENOUS BLD VENIPUNCTURE: CPT

## 2024-04-25 RX ORDER — KETOROLAC TROMETHAMINE 10 MG/1
10 TABLET, FILM COATED ORAL EVERY 6 HOURS PRN
Qty: 20 TABLET | Refills: 0 | Status: SHIPPED | OUTPATIENT
Start: 2024-04-25 | End: 2025-04-25

## 2024-04-25 RX ORDER — DEXAMETHASONE 4 MG/1
4 TABLET ORAL
Qty: 10 TABLET | Refills: 0 | Status: SHIPPED | OUTPATIENT
Start: 2024-04-25 | End: 2024-05-05

## 2024-04-25 RX ORDER — TIZANIDINE 2 MG/1
2 TABLET ORAL 3 TIMES DAILY PRN
Qty: 30 TABLET | Refills: 0 | Status: SHIPPED | OUTPATIENT
Start: 2024-04-25

## 2024-04-25 RX ORDER — LACTULOSE 10 G/15ML
20 SOLUTION ORAL ONCE
Status: COMPLETED | OUTPATIENT
Start: 2024-04-25 | End: 2024-04-25

## 2024-04-25 RX ORDER — OXYCODONE HYDROCHLORIDE AND ACETAMINOPHEN 5; 325 MG/1; MG/1
1 TABLET ORAL EVERY 4 HOURS PRN
Qty: 42 TABLET | Refills: 0 | Status: SHIPPED | OUTPATIENT
Start: 2024-04-25 | End: 2024-04-25

## 2024-04-25 RX ORDER — OXYCODONE HYDROCHLORIDE AND ACETAMINOPHEN 5; 325 MG/1; MG/1
1 TABLET ORAL EVERY 4 HOURS PRN
Qty: 42 TABLET | Refills: 0 | Status: SHIPPED | OUTPATIENT
Start: 2024-04-25 | End: 2024-05-02

## 2024-04-25 RX ADMIN — KETOROLAC TROMETHAMINE 30 MG: 15 INJECTION, SOLUTION INTRAMUSCULAR; INTRAVENOUS at 09:14

## 2024-04-25 RX ADMIN — Medication 5000 UNITS: at 08:37

## 2024-04-25 RX ADMIN — Medication 100 MCG: at 08:41

## 2024-04-25 RX ADMIN — POLYETHYLENE GLYCOL 3350 17 G: 17 POWDER, FOR SOLUTION ORAL at 09:31

## 2024-04-25 RX ADMIN — DEXAMETHASONE SODIUM PHOSPHATE 4 MG: 4 INJECTION INTRA-ARTICULAR; INTRALESIONAL; INTRAMUSCULAR; INTRAVENOUS; SOFT TISSUE at 01:24

## 2024-04-25 RX ADMIN — INSULIN LISPRO 1 UNITS: 100 INJECTION, SOLUTION INTRAVENOUS; SUBCUTANEOUS at 16:23

## 2024-04-25 RX ADMIN — ATENOLOL 25 MG: 25 TABLET ORAL at 08:40

## 2024-04-25 RX ADMIN — PAROXETINE 10 MG: 10 TABLET, FILM COATED ORAL at 08:41

## 2024-04-25 RX ADMIN — PANTOPRAZOLE SODIUM 40 MG: 40 TABLET, DELAYED RELEASE ORAL at 08:40

## 2024-04-25 RX ADMIN — OXYCODONE HYDROCHLORIDE AND ACETAMINOPHEN 1 TABLET: 5; 325 TABLET ORAL at 15:23

## 2024-04-25 RX ADMIN — LACTULOSE 20 G: 20 SOLUTION ORAL at 12:41

## 2024-04-25 RX ADMIN — ORPHENADRINE CITRATE 60 MG: 30 INJECTION, SOLUTION INTRAMUSCULAR; INTRAVENOUS at 06:16

## 2024-04-25 RX ADMIN — SODIUM CHLORIDE, PRESERVATIVE FREE 10 ML: 5 INJECTION INTRAVENOUS at 09:15

## 2024-04-25 RX ADMIN — DEXAMETHASONE SODIUM PHOSPHATE 4 MG: 4 INJECTION INTRA-ARTICULAR; INTRALESIONAL; INTRAMUSCULAR; INTRAVENOUS; SOFT TISSUE at 09:14

## 2024-04-25 RX ADMIN — KETOROLAC TROMETHAMINE 30 MG: 15 INJECTION, SOLUTION INTRAMUSCULAR; INTRAVENOUS at 12:41

## 2024-04-25 RX ADMIN — HYDROCHLOROTHIAZIDE 25 MG: 25 TABLET ORAL at 08:41

## 2024-04-25 RX ADMIN — KETOROLAC TROMETHAMINE 30 MG: 15 INJECTION, SOLUTION INTRAMUSCULAR; INTRAVENOUS at 01:24

## 2024-04-25 RX ADMIN — FAMOTIDINE 20 MG: 20 TABLET ORAL at 08:42

## 2024-04-25 RX ADMIN — ENOXAPARIN SODIUM 30 MG: 100 INJECTION SUBCUTANEOUS at 08:42

## 2024-04-25 ASSESSMENT — PAIN SCALES - GENERAL
PAINLEVEL_OUTOF10: 5
PAINLEVEL_OUTOF10: 5
PAINLEVEL_OUTOF10: 0
PAINLEVEL_OUTOF10: 5

## 2024-04-25 ASSESSMENT — PAIN DESCRIPTION - ORIENTATION
ORIENTATION: LOWER
ORIENTATION: LOWER
ORIENTATION: LEFT

## 2024-04-25 ASSESSMENT — PAIN DESCRIPTION - DESCRIPTORS
DESCRIPTORS: STABBING;SHOOTING
DESCRIPTORS: SHOOTING;STABBING
DESCRIPTORS: SHOOTING;STABBING

## 2024-04-25 ASSESSMENT — PAIN DESCRIPTION - LOCATION
LOCATION: BACK
LOCATION: BACK
LOCATION: ABDOMEN

## 2024-04-25 NOTE — PROGRESS NOTES
[facility]   Progress note      Subjective:    The patient is awake and alert.  Numerous questions asked and answered. She has not yet seen ortho spine team.  No acute events overnight.    Denies chest pain, angina, SOB     Objective:    BP (!) 131/93   Pulse 81   Temp 97.6 °F (36.4 °C) (Oral)   Resp 16   Ht 1.702 m (5' 7\")   Wt 123.4 kg (272 lb)   SpO2 95%   BMI 42.60 kg/m²     In: 360 [P.O.:360]  Out: 0   In: 360   Out: 0     General appearance: NAD, conversant, morbidly obese  Eyes: Sclerae anicteric, PERRLA  HEENT: AT/NC, MMM  Neck: FROM, supple, no thyromegaly  Lymph: No cervical / supraclavicular lymphadenopathy  Lungs: Clear to auscultation, WOB normal  CV: RRR, no MRGs, no lower extremity edema  Abdomen: Soft, non-tender; no masses or HSM, +BS  Extremities: FROM without synovitis.  No clubbing or cyanosis of the hands.  Skin: no rash, induration, lesions, or ulcers  Psych: Calm and cooperative.  Normal judgement and insight.  Normal mood and affect.  Neuro: Alert and interactive, face symmetric, speech fluent.     Recent Labs     04/23/24  0625 04/24/24  0330 04/25/24  0729   WBC 10.9 11.6* 11.1   HGB 13.8 13.4 14.9   HCT 46.8 44.4 48.4*    207 258       Recent Labs     04/23/24  0625 04/24/24  0330 04/25/24  0729    136 137   K 5.0 4.3 4.7    102 100   CO2 24 27 25   BUN 22 24* 29*   CREATININE 0.9 0.9 0.8   CALCIUM 9.1 8.8 9.4       Assessment:    Principal Problem:    Lumbar herniated disc  Resolved Problems:    * No resolved hospital problems. *      Plan:    Patient is a 59-year-old female admitted to [facility] for  Lumbar herniated disc  -Monitor labs  -PT OT, hold until after MRI  -MRI lumbar spine pending-->consult ortho spine pending result  -Pain control  -Social work consult for possible rehab needs  -IV Decadron 6 mg daily  --muscle relaxer  --nsaid     diabetes mellitus  -Continue to monitor blood glucose levels  -continue low-dose corrective

## 2024-04-25 NOTE — CARE COORDINATION
Updated plan of care. Mri done on 4/24. Awaiting ortho spine consult. [facility] following. Pending therapies. Will follow-mjo

## 2024-04-25 NOTE — PROGRESS NOTES
P Quality Flow/Interdisciplinary Rounds Progress Note        Quality Flow Rounds held on April 25, 2024    Disciplines Attending:  Bedside Nurse, , , and Nursing Unit Leadership    Melisa Don was admitted on 4/22/2024  2:09 PM    Anticipated Discharge Date:       Disposition:    Tor Score:  Tor Scale Score: 21    Readmission Risk              Risk of Unplanned Readmission:  9           Discussed patient goal for the day, patient clinical progression, and barriers to discharge.  The following Goal(s) of the Day/Commitment(s) have been identified:   Discharge Planning      aMdison Mcrae RN  April 25, 2024

## 2024-04-25 NOTE — DISCHARGE SUMMARY
[facility]   Discharge summary   Patient ID:  Melisa Don  [id number]  59 y.o.  1965    Admit date: 4/22/2024    Discharge date and time: 4/25/2024    Admission Diagnoses:   Patient Active Problem List   Diagnosis    Chest pain    Obesity    Hypokalemia    Post-operative pain    Essential hypertension    Diabetes mellitus (HCC)    Lumbar herniated disc    Lumbar radiculopathy       Discharge Diagnoses: ***    Consults: {consultation:03978}    Procedures: ***    Hospital Course: The patient is a 59 y.o. female of Jason Bazan DO with significant past medical history of *** who presents with ***    Recent Labs     04/23/24  0625 04/24/24  0330 04/25/24  0729   WBC 10.9 11.6* 11.1   HGB 13.8 13.4 14.9   HCT 46.8 44.4 48.4*    207 258       Recent Labs     04/23/24  0625 04/24/24  0330 04/25/24  0729    136 137   K 5.0 4.3 4.7    102 100   CO2 24 27 25   BUN 22 24* 29*   CREATININE 0.9 0.9 0.8   CALCIUM 9.1 8.8 9.4       MRI LUMBAR SPINE WO CONTRAST    Result Date: 4/23/2024  EXAMINATION: MRI OF THE LUMBAR SPINE WITHOUT CONTRAST, 4/23/2024 4:57 pm TECHNIQUE: Multiplanar multisequence MRI of the lumbar spine was performed without the administration of intravenous contrast. COMPARISON: None. HISTORY: ORDERING SYSTEM PROVIDED HISTORY: lumbar myelopathy and radiculopathy TECHNOLOGIST PROVIDED HISTORY: Reason for exam:->lumbar myelopathy and radiculopathy FINDINGS: BONES/ALIGNMENT: There is normal alignment of the spine. The vertebral body heights are maintained. The bone marrow signal appears unremarkable. SPINAL CORD: The conus terminates normally. SOFT TISSUES: No paraspinal mass identified. L1-L2: Mild disc desiccation without herniation.  No significant central canal, lateral recess or neural foraminal stenoses. L2-L3: Mild disc desiccation without herniation.  No significant central canal, lateral recess or neural foraminal stenoses. L3-L4: Soft tissue in the superior  by mouth every 6 hours as needed for Pain     oxyCODONE-acetaminophen 5-325 MG per tablet  Commonly known as: PERCOCET  Take 1 tablet by mouth every 4 hours as needed for Pain for up to 7 days. Max Daily Amount: 6 tablets     Semaglutide(0.25 or 0.5MG/DOS) 2 MG/3ML Sopn  Inject 0.5 mg into the skin once a week     tiZANidine 2 MG tablet  Commonly known as: ZANAFLEX  Take 1 tablet by mouth 3 times daily as needed (muscle spasm)            CONTINUE taking these medications      * albuterol sulfate  (90 Base) MCG/ACT inhaler  Commonly known as: PROVENTIL;VENTOLIN;PROAIR     * albuterol (2.5 MG/3ML) 0.083% nebulizer solution  Commonly known as: PROVENTIL     atenolol 25 MG tablet  Commonly known as: TENORMIN     Jardiance 25 MG tablet  Generic drug: empagliflozin     metFORMIN 1000 MG tablet  Commonly known as: GLUCOPHAGE     pantoprazole 40 MG tablet  Commonly known as: PROTONIX     PARoxetine 10 MG tablet  Commonly known as: PAXIL     vitamin B-12 100 MCG tablet  Commonly known as: CYANOCOBALAMIN     vitamin D3 125 MCG (5000 UT) Tabs tablet  Commonly known as: CHOLECALCIFEROL           * This list has 2 medication(s) that are the same as other medications prescribed for you. Read the directions carefully, and ask your doctor or other care provider to review them with you.                STOP taking these medications      clindamycin 1 % lotion  Commonly known as: CLEOCIN T     cyclobenzaprine 10 MG tablet  Commonly known as: FLEXERIL     diclofenac sodium 1 % Gel  Commonly known as: VOLTAREN     famotidine 20 MG tablet  Commonly known as: PEPCID     famotidine 40 MG tablet  Commonly known as: PEPCID     hydroCHLOROthiazide 25 MG tablet  Commonly known as: HYDRODIURIL     ibuprofen 600 MG tablet  Commonly known as: ADVIL;MOTRIN     meloxicam 7.5 MG tablet  Commonly known as: MOBIC     methylPREDNISolone 4 MG tablet  Commonly known as: MEDROL DOSEPACK     mupirocin 2 % ointment  Commonly known as: BACTROBAN

## 2024-04-25 NOTE — PLAN OF CARE
Problem: Discharge Planning  Goal: Discharge to home or other facility with appropriate resources  Outcome: Adequate for Discharge     Problem: Pain  Goal: Verbalizes/displays adequate comfort level or baseline comfort level  Outcome: Adequate for Discharge     Problem: Safety - Adult  Goal: Free from fall injury  Outcome: Adequate for Discharge     Problem: Chronic Conditions and Co-morbidities  Goal: Patient's chronic conditions and co-morbidity symptoms are monitored and maintained or improved  Outcome: Adequate for Discharge

## 2024-04-25 NOTE — PLAN OF CARE
Problem: Discharge Planning  Goal: Discharge to home or other facility with appropriate resources  4/25/2024 0139 by Jennifer Burris RN  Outcome: Progressing  4/24/2024 1324 by Rowan Lopes RN  Outcome: Progressing     Problem: Pain  Goal: Verbalizes/displays adequate comfort level or baseline comfort level  4/25/2024 0139 by Jennifer Burris RN  Outcome: Progressing  4/24/2024 1324 by Rowan Lopes RN  Outcome: Progressing     Problem: Safety - Adult  Goal: Free from fall injury  4/25/2024 0139 by Jennifer Burris RN  Outcome: Progressing  4/24/2024 1324 by Rowan Lopes RN  Outcome: Progressing     Problem: Chronic Conditions and Co-morbidities  Goal: Patient's chronic conditions and co-morbidity symptoms are monitored and maintained or improved  4/25/2024 0139 by Jennifer Burris RN  Outcome: Progressing  Flowsheets (Taken 4/25/2024 0139)  Care Plan - Patient's Chronic Conditions and Co-Morbidity Symptoms are Monitored and Maintained or Improved: Monitor and assess patient's chronic conditions and comorbid symptoms for stability, deterioration, or improvement  4/24/2024 1324 by Rowan Lopes RN  Outcome: Progressing

## 2024-04-25 NOTE — HOME CARE
PLEASE ADD HOMECARE ORDERS FOR PATIENTS SOC BEFORE PATIENT DISCHARGES.      KIRSTY LEON [facility]

## 2024-04-25 NOTE — PROGRESS NOTES
CLINICAL PHARMACY NOTE: MEDS TO BEDS    Total # of Prescriptions Filled: 4   The following medications were delivered to the patient:  Ketorolac 10 mg  Dexamethasone 4 mg   Tizanidine 2 mg  Percocet 5/325 mg     Additional Documentation:

## 2024-04-26 NOTE — CONSULTS
[place] [address]                              CONSULTATION      PATIENT NAME: JASEN ESTRADA              : 1965  MED REC NO: [id number]                        ROOM: 0729  ACCOUNT NO: [id number]                       ADMIT DATE: 2024  PROVIDER: Anselmo Couch DO      PATIENT IDENTIFICATION:  A 59-year-old female.    REASON FOR CONSULTATION:  Lower back pain, radiculopathy.    HISTORY OF CHIEF COMPLAINT:  The patient was admitted to the hospital for significant intractable back pain and radiculopathy.  She actually stated that in the s, she fell to the ground backwards and sustained significant trauma to her back as well as a head concussion.  She has had back problems since then.  She has been on chronic treatment related to chiropractic and massage therapy.  Over the last couple weeks, says she has progressive pain and discomfort.  She states she got into a position in the chair and moved out of bed, felt a pop in her back, progressively got pain.  She got to the point where she got out of the chair and she was unable to ambulate with severe back pain and right lower extremity discomfort.  She was admitted to the hospital.  As noted in the hospital notes from the emergency room, admitted and evaluated.  She states though today she is feeling much better, overall she feels just a soreness in her buttock.  She does not have the radiation of pain down her back.  This has been done since the pain medication was given for her.  She denies any bladder or bowel incontinence with this.  It was noted that she states her pain and discomfort that she had before this was intractable.  She has had no prior treatment including physical therapy, although they did set up home therapy for her.  She has no epidural shots or any additional treatments except the chiropractic and massage therapy.    PHYSICAL  EXAMINATION:  Overall examining her, she is actually in the bed, she actually has her legs crossed in the [place] position.  She has complaints of pain just in the lower back, but mostly in the PSIS on the right-hand side.  No significant sciatic notch tenderness.  She has good motor strength, lower extremities are 5/5 of all the motor area.  There are no deficits or footdrop noted.  There is no sensory deficit.  She has no straight leg raise.  No femoral stretch sign.  Calves are supple.  No pitting edema.  No clonus.  Deep tendon reflexes are intact.  She does not have significant range of motion in her back causing pain or discomfort, although extension seems to cause her a little bit more discomfort.    DIAGNOSTIC DATA:  Plain x-rays were reviewed, which I did flexion-extension views, which I disagree with the evaluation.  She does have some instability noted more so at L3-4.  She extends to a neutral position to a spondylolisthesis, which was not called on the current x-rays.  It is a grade 1, measures 8 mm.  There is a larger grade 1 spondylolisthesis at L4-5 that does move slightly in flexion-extension views.  She does have significant disk disease at L5-S1 with facet hypertrophy seen at L3-4, L4-5 and L5-S1.  AP views also show a degenerative scoliosis noted.  Curve measurements around 12 degrees.  This is the Arzate angle measurement at this level.  MRI scan was done to the lumbar spine.  There is not severe compression pathology of the spinal canal noted.  She does have subluxation of facet joints and instability noted of the facet joints of L3-4.  Mild subluxation is noted at the L4-5 facet joints with facet hypertrophy at L4-5 causing some mild lateral recess stenosis and minimal foraminal narrowing.  There is also some facet hypertrophy at L5-S1 causing minimal foraminal narrowing.  Lateral films also show degenerative disk disease at L5-S1 without significant retropulsion.    ASSESSMENT:  At this time:

## 2024-05-08 ENCOUNTER — TELEPHONE (OUTPATIENT)
Dept: ORTHOPEDIC SURGERY | Age: 59
End: 2024-05-08

## 2024-05-08 PROBLEM — M75.122 COMPLETE TEAR OF LEFT ROTATOR CUFF: Status: ACTIVE | Noted: 2024-05-08

## 2024-05-08 PROBLEM — M75.121 COMPLETE ROTATOR CUFF TEAR OR RUPTURE OF RIGHT SHOULDER, NOT SPECIFIED AS TRAUMATIC: Status: ACTIVE | Noted: 2024-05-08

## 2024-05-08 NOTE — TELEPHONE ENCOUNTER
[facility]  ORTHOPAEDIC SURGERY SCHEDULING NOTE    Patient wishes to proceed after surgery discussion with Dr. Mack.     Surgical education was discussed and patient was given the surgical handout. Pre/post-op appointments were made as needed. Patient is also aware to obtain any medical clearances prior to surgery, if requested. Notified that pre-admission testing will also be reaching out for education and instructions on arrival time prior to procedure.     Patient is to obtain clearance(s) from: MEDICAL     Authorization submitted to [employer]    Status: NOT REQ        Surgery: LEFT SHOULDER ARTHROSCOPY, ROTATOR CUFF REPAIR, BICEPS TENOTOMY V. TENODESIS   OR DATE: 8/6/24  Vendor: [employer]  Block: ISB  CPT: 54172 + [id number]  DX: M75.121

## 2024-07-31 ENCOUNTER — OFFICE VISIT (OUTPATIENT)
Dept: ORTHOPEDIC SURGERY | Age: 59
End: 2024-07-31

## 2024-07-31 VITALS — BODY MASS INDEX: 41.59 KG/M2 | WEIGHT: 265 LBS | HEIGHT: 67 IN

## 2024-07-31 DIAGNOSIS — S46.011A TRAUMATIC INCOMPLETE TEAR OF RIGHT ROTATOR CUFF, INITIAL ENCOUNTER: ICD-10-CM

## 2024-07-31 DIAGNOSIS — Z01.818 PRE-OP EXAMINATION: Primary | ICD-10-CM

## 2024-07-31 RX ORDER — TIRZEPATIDE 2.5 MG/.5ML
2.5 INJECTION, SOLUTION SUBCUTANEOUS
COMMUNITY
Start: 2024-07-11

## 2024-07-31 RX ORDER — MELOXICAM 7.5 MG/1
TABLET ORAL
COMMUNITY
Start: 2024-05-08

## 2024-07-31 NOTE — PROGRESS NOTES
Department of Orthopedic Surgery  Attending Pre-operative History and Physical        DIAGNOSIS:  left shoulder rotator cuff tear     INDICATION:  Failed Conservative Management      PROCEDURE: Left shoulder arthroscopy, rotator cuff repair     CHIEF COMPLAINT:  left shoulder pain     History Obtained From:  patient    HISTORY OF PRESENT ILLNESS:      The patient is a 59 y.o. female with significant past medical history of left shoulder pain that has been refractory to conservative treatment.  She has MRI showing full-thickness rotator cuff tear.  She is interested in proceeding with surgical management.  We discussed risks benefits and alternatives of surgery.  Risks include but are not limited to infection, neurovascular injury, read tear, DVT/pulm embolus, death from anesthesia, unforeseen risks.  She understands and like to proceed    Past Medical History:        Diagnosis Date    Diabetes mellitus (HCC)     GERD (gastroesophageal reflux disease)     Hypertension     IBS (irritable bowel syndrome)     Prolonged emergence from general anesthesia     Whooping cough 2020       Past Surgical History:        Procedure Laterality Date    CHOLECYSTECTOMY  11/11/2011    FINGER TRIGGER RELEASE Left 12/28/2022    LEFT THUMB TRIGGER  RELEASE LEFT INDEX AND MIDDLE FINGER TRIGGER RELEASE LEFT CARPAL TUNNEL RELEASE performed by Dami Martinez MD at [facility] OR    FOOT SURGERY Right 03/2019    plantar    HEEL SPUR SURGERY Left 12/2016    JOINT REPLACEMENT Bilateral 09/2017    Great toe joints    JOINT REPLACEMENT Bilateral 03/2017    both big toes    PARTIAL HYSTERECTOMY (CERVIX NOT REMOVED)      SHOULDER ARTHROSCOPY Right 3/1/2023    RIGHT SHOULDER ARTHROSCOPY WITH DECOMPRESSION ROTATOR CUFF REPAIR POSSIBLE BICEPS TENODESIS performed by Dami Martinez MD at [facility] OR       Medications Prior to Admission:   Not in a hospital admission.    Allergies:  Demerol hcl [meperidine] and Norco [hydrocodone-acetaminophen]    History of

## 2024-08-01 NOTE — PROGRESS NOTES
[facility] PRE-ADMISSION TESTING INSTRUCTIONS    The Preadmission Testing patient is instructed accordingly using the following criteria (check applicable):    ARRIVAL INSTRUCTIONS:  [x] Parking the day of Surgery is located in the Main Entrance lot.  Upon entering the door, make an immediate right to the surgery reception desk    [x] Bring photo ID and insurance card    [] Bring in a copy of Living will or Durable Power of  papers.    [x] Please be sure to arrange for a responsible adult to provide transportation to and from the hospital    [x] Please arrange for a responsible adult to be with you for the 24 hour period post procedure due to having anesthesia    [x] If you awake am of surgery not feeling well or have temperature >100 please call [phone]    GENERAL INSTRUCTIONS:    [x] No solid foods after midnight, may have up to 8oz of water until 4 hours prior to surgery. No gum, no candy, no mints. NPO time: 0930       [x] You may brush your teeth, do not swallow any toothpaste    [x] Take medications as instructed     [x] Stop herbal supplements and vitamins 5 days prior to procedure    [x] Follow preop dosing of blood thinners per physician instructions    [] Take 1/2 dose of evening insulin, but no insulin after midnight    [x] No oral diabetic medications after midnight    [x] If diabetic and have low blood sugar or feel symptomatic, take 1-2oz apple juice only    [] Bring inhalers day of surgery    [] Bring urine specimen day of surgery    [x] Shower or bath with soap, lather and rinse well, AM of Surgery, no lotion, powders or creams to surgical site    [] Follow bowel prep as instructed per surgeon    [x] No tobacco products within 24 hours of surgery     [x] No alcohol or illegal drug use, marijuana included, within 24 hours of surgery.    [x] Jewelry, body piercing's, eyeglasses, contact lenses and dentures are not permitted into surgery (bring cases)      [x]

## 2024-08-06 ENCOUNTER — ANESTHESIA EVENT (OUTPATIENT)
Dept: OPERATING ROOM | Age: 59
End: 2024-08-06
Payer: COMMERCIAL

## 2024-08-06 ENCOUNTER — ANESTHESIA (OUTPATIENT)
Dept: OPERATING ROOM | Age: 59
End: 2024-08-06
Payer: COMMERCIAL

## 2024-08-06 ENCOUNTER — HOSPITAL ENCOUNTER (OUTPATIENT)
Age: 59
Setting detail: OUTPATIENT SURGERY
Discharge: HOME | End: 2024-08-06
Attending: ORTHOPAEDIC SURGERY | Admitting: ORTHOPAEDIC SURGERY
Payer: COMMERCIAL

## 2024-08-06 VITALS
BODY MASS INDEX: 41.59 KG/M2 | HEART RATE: 70 BPM | SYSTOLIC BLOOD PRESSURE: 122 MMHG | HEIGHT: 67 IN | DIASTOLIC BLOOD PRESSURE: 74 MMHG | OXYGEN SATURATION: 96 % | WEIGHT: 265 LBS | RESPIRATION RATE: 18 BRPM | TEMPERATURE: 97.7 F

## 2024-08-06 DIAGNOSIS — S46.012A TRAUMATIC COMPLETE TEAR OF LEFT ROTATOR CUFF, INITIAL ENCOUNTER: Primary | ICD-10-CM

## 2024-08-06 LAB
ANION GAP SERPL CALCULATED.3IONS-SCNC: 10 MMOL/L (ref 7–16)
BUN SERPL-MCNC: 17 MG/DL (ref 6–23)
CALCIUM SERPL-MCNC: 9.3 MG/DL (ref 8.6–10.2)
CHLORIDE SERPL-SCNC: 103 MMOL/L (ref 98–107)
CO2 SERPL-SCNC: 27 MMOL/L (ref 22–29)
CREAT SERPL-MCNC: 0.9 MG/DL (ref 0.5–1)
ERYTHROCYTE [DISTWIDTH] IN BLOOD BY AUTOMATED COUNT: 14.6 % (ref 11.5–15)
GFR, ESTIMATED: 72 ML/MIN/1.73M2
GLUCOSE SERPL-MCNC: 112 MG/DL (ref 74–99)
HCT VFR BLD AUTO: 49 % (ref 34–48)
HGB BLD-MCNC: 14.7 G/DL (ref 11.5–15.5)
MCH RBC QN AUTO: 28.7 PG (ref 26–35)
MCHC RBC AUTO-ENTMCNC: 30 G/DL (ref 32–34.5)
MCV RBC AUTO: 95.5 FL (ref 80–99.9)
PLATELET # BLD AUTO: 238 K/UL (ref 130–450)
PMV BLD AUTO: 10.3 FL (ref 7–12)
POTASSIUM SERPL-SCNC: 4.3 MMOL/L (ref 3.5–5)
RBC # BLD AUTO: 5.13 M/UL (ref 3.5–5.5)
SODIUM SERPL-SCNC: 140 MMOL/L (ref 132–146)
WBC OTHER # BLD: 8.1 K/UL (ref 4.5–11.5)

## 2024-08-06 PROCEDURE — 3600000003 HC SURGERY LEVEL 3 BASE: Performed by: ORTHOPAEDIC SURGERY

## 2024-08-06 PROCEDURE — 29827 SHO ARTHRS SRG RT8TR CUF RPR: CPT | Performed by: ORTHOPAEDIC SURGERY

## 2024-08-06 PROCEDURE — 6360000002 HC RX W HCPCS: Performed by: ANESTHESIOLOGY

## 2024-08-06 PROCEDURE — 7100000010 HC PHASE II RECOVERY - FIRST 15 MIN: Performed by: ORTHOPAEDIC SURGERY

## 2024-08-06 PROCEDURE — 3700000000 HC ANESTHESIA ATTENDED CARE: Performed by: ORTHOPAEDIC SURGERY

## 2024-08-06 PROCEDURE — 2500000003 HC RX 250 WO HCPCS: Performed by: NURSE ANESTHETIST, CERTIFIED REGISTERED

## 2024-08-06 PROCEDURE — 3600000013 HC SURGERY LEVEL 3 ADDTL 15MIN: Performed by: ORTHOPAEDIC SURGERY

## 2024-08-06 PROCEDURE — 6360000002 HC RX W HCPCS: Performed by: ORTHOPAEDIC SURGERY

## 2024-08-06 PROCEDURE — 64415 NJX AA&/STRD BRCH PLXS IMG: CPT | Performed by: ANESTHESIOLOGY

## 2024-08-06 PROCEDURE — 2500000003 HC RX 250 WO HCPCS: Performed by: ORTHOPAEDIC SURGERY

## 2024-08-06 PROCEDURE — 7100000011 HC PHASE II RECOVERY - ADDTL 15 MIN: Performed by: ORTHOPAEDIC SURGERY

## 2024-08-06 PROCEDURE — 7100000000 HC PACU RECOVERY - FIRST 15 MIN: Performed by: ORTHOPAEDIC SURGERY

## 2024-08-06 PROCEDURE — 3700000001 HC ADD 15 MINUTES (ANESTHESIA): Performed by: ORTHOPAEDIC SURGERY

## 2024-08-06 PROCEDURE — 2580000003 HC RX 258: Performed by: NURSE ANESTHETIST, CERTIFIED REGISTERED

## 2024-08-06 PROCEDURE — 2720000010 HC SURG SUPPLY STERILE: Performed by: ORTHOPAEDIC SURGERY

## 2024-08-06 PROCEDURE — 2580000003 HC RX 258: Performed by: ANESTHESIOLOGY

## 2024-08-06 PROCEDURE — L3650 SO 8 ABD RESTRAINT PRE OTS: HCPCS | Performed by: ORTHOPAEDIC SURGERY

## 2024-08-06 PROCEDURE — 6370000000 HC RX 637 (ALT 250 FOR IP): Performed by: ANESTHESIOLOGY

## 2024-08-06 PROCEDURE — 2500000003 HC RX 250 WO HCPCS: Performed by: ANESTHESIOLOGY

## 2024-08-06 PROCEDURE — C1713 ANCHOR/SCREW BN/BN,TIS/BN: HCPCS | Performed by: ORTHOPAEDIC SURGERY

## 2024-08-06 PROCEDURE — 7100000001 HC PACU RECOVERY - ADDTL 15 MIN: Performed by: ORTHOPAEDIC SURGERY

## 2024-08-06 PROCEDURE — 2709999900 HC NON-CHARGEABLE SUPPLY: Performed by: ORTHOPAEDIC SURGERY

## 2024-08-06 PROCEDURE — 2580000003 HC RX 258: Performed by: ORTHOPAEDIC SURGERY

## 2024-08-06 PROCEDURE — 29828 SHO ARTHRS SRG BICP TENODSIS: CPT | Performed by: ORTHOPAEDIC SURGERY

## 2024-08-06 PROCEDURE — 80048 BASIC METABOLIC PNL TOTAL CA: CPT

## 2024-08-06 PROCEDURE — 6360000002 HC RX W HCPCS: Performed by: NURSE ANESTHETIST, CERTIFIED REGISTERED

## 2024-08-06 PROCEDURE — 29823 SHO ARTHRS SRG XTNSV DBRDMT: CPT | Performed by: ORTHOPAEDIC SURGERY

## 2024-08-06 PROCEDURE — 85027 COMPLETE CBC AUTOMATED: CPT

## 2024-08-06 DEVICE — SWIVELOCK, SP BC KL 4.75MM
Type: IMPLANTABLE DEVICE | Site: SHOULDER | Status: FUNCTIONAL
Brand: ARTHREX®

## 2024-08-06 DEVICE — SP FBRTAK RC FBRTPE BLU & STTPE WH/BLK
Type: IMPLANTABLE DEVICE | Site: SHOULDER | Status: FUNCTIONAL
Brand: ARTHREX®

## 2024-08-06 DEVICE — SP FBRTAK RC TGRTPE WH/BLK & STTPE WH/BL
Type: IMPLANTABLE DEVICE | Site: SHOULDER | Status: FUNCTIONAL
Brand: ARTHREX®

## 2024-08-06 DEVICE — LNT IMPLANT SYSTEM, 4.75 BC KL SWIVELOCK
Type: IMPLANTABLE DEVICE | Site: SHOULDER | Status: FUNCTIONAL
Brand: ARTHREX®

## 2024-08-06 RX ORDER — EPINEPHRINE 1 MG/ML
INJECTION, SOLUTION, CONCENTRATE INTRAVENOUS PRN
Status: DISCONTINUED | OUTPATIENT
Start: 2024-08-06 | End: 2024-08-06 | Stop reason: ALTCHOICE

## 2024-08-06 RX ORDER — SODIUM CHLORIDE 0.9 % (FLUSH) 0.9 %
5-40 SYRINGE (ML) INJECTION PRN
Status: DISCONTINUED | OUTPATIENT
Start: 2024-08-06 | End: 2024-08-06 | Stop reason: HOSPADM

## 2024-08-06 RX ORDER — FENTANYL CITRATE 50 UG/ML
25 INJECTION, SOLUTION INTRAMUSCULAR; INTRAVENOUS PRN
Status: DISCONTINUED | OUTPATIENT
Start: 2024-08-06 | End: 2024-08-06 | Stop reason: HOSPADM

## 2024-08-06 RX ORDER — SODIUM CHLORIDE 9 MG/ML
INJECTION, SOLUTION INTRAVENOUS PRN
Status: DISCONTINUED | OUTPATIENT
Start: 2024-08-06 | End: 2024-08-06 | Stop reason: HOSPADM

## 2024-08-06 RX ORDER — ROPIVACAINE HYDROCHLORIDE 5 MG/ML
30 INJECTION, SOLUTION EPIDURAL; INFILTRATION; PERINEURAL
Status: DISCONTINUED | OUTPATIENT
Start: 2024-08-06 | End: 2024-08-06 | Stop reason: HOSPADM

## 2024-08-06 RX ORDER — DIPHENHYDRAMINE HYDROCHLORIDE 50 MG/ML
12.5 INJECTION INTRAMUSCULAR; INTRAVENOUS
Status: DISCONTINUED | OUTPATIENT
Start: 2024-08-06 | End: 2024-08-06 | Stop reason: HOSPADM

## 2024-08-06 RX ORDER — GLYCOPYRROLATE 1 MG/5 ML
SYRINGE (ML) INTRAVENOUS PRN
Status: DISCONTINUED | OUTPATIENT
Start: 2024-08-06 | End: 2024-08-06 | Stop reason: SDUPTHER

## 2024-08-06 RX ORDER — SODIUM CHLORIDE 0.9 % (FLUSH) 0.9 %
5-40 SYRINGE (ML) INJECTION EVERY 12 HOURS SCHEDULED
Status: DISCONTINUED | OUTPATIENT
Start: 2024-08-06 | End: 2024-08-06 | Stop reason: HOSPADM

## 2024-08-06 RX ORDER — MEPERIDINE HYDROCHLORIDE 25 MG/ML
12.5 INJECTION INTRAMUSCULAR; INTRAVENOUS; SUBCUTANEOUS ONCE
Status: DISCONTINUED | OUTPATIENT
Start: 2024-08-06 | End: 2024-08-06 | Stop reason: HOSPADM

## 2024-08-06 RX ORDER — FENTANYL CITRATE 50 UG/ML
25 INJECTION, SOLUTION INTRAMUSCULAR; INTRAVENOUS EVERY 5 MIN PRN
Status: DISCONTINUED | OUTPATIENT
Start: 2024-08-06 | End: 2024-08-06 | Stop reason: HOSPADM

## 2024-08-06 RX ORDER — ACETAMINOPHEN 500 MG
1000 TABLET ORAL ONCE
Status: COMPLETED | OUTPATIENT
Start: 2024-08-06 | End: 2024-08-06

## 2024-08-06 RX ORDER — PROPOFOL 10 MG/ML
INJECTION, EMULSION INTRAVENOUS PRN
Status: DISCONTINUED | OUTPATIENT
Start: 2024-08-06 | End: 2024-08-06 | Stop reason: SDUPTHER

## 2024-08-06 RX ORDER — KETAMINE HYDROCHLORIDE 10 MG/ML
INJECTION, SOLUTION INTRAMUSCULAR; INTRAVENOUS PRN
Status: DISCONTINUED | OUTPATIENT
Start: 2024-08-06 | End: 2024-08-06 | Stop reason: SDUPTHER

## 2024-08-06 RX ORDER — FENTANYL CITRATE 50 UG/ML
INJECTION, SOLUTION INTRAMUSCULAR; INTRAVENOUS PRN
Status: DISCONTINUED | OUTPATIENT
Start: 2024-08-06 | End: 2024-08-06 | Stop reason: SDUPTHER

## 2024-08-06 RX ORDER — MIDAZOLAM HYDROCHLORIDE 2 MG/2ML
0.5 INJECTION, SOLUTION INTRAMUSCULAR; INTRAVENOUS PRN
Status: DISCONTINUED | OUTPATIENT
Start: 2024-08-06 | End: 2024-08-06 | Stop reason: HOSPADM

## 2024-08-06 RX ORDER — KETOROLAC TROMETHAMINE 10 MG/1
10 TABLET, FILM COATED ORAL EVERY 6 HOURS PRN
Qty: 8 TABLET | Refills: 0 | Status: SHIPPED | OUTPATIENT
Start: 2024-08-06 | End: 2024-08-08

## 2024-08-06 RX ORDER — NALOXONE HYDROCHLORIDE 0.4 MG/ML
INJECTION, SOLUTION INTRAMUSCULAR; INTRAVENOUS; SUBCUTANEOUS PRN
Status: DISCONTINUED | OUTPATIENT
Start: 2024-08-06 | End: 2024-08-06 | Stop reason: HOSPADM

## 2024-08-06 RX ORDER — ONDANSETRON 2 MG/ML
INJECTION INTRAMUSCULAR; INTRAVENOUS PRN
Status: DISCONTINUED | OUTPATIENT
Start: 2024-08-06 | End: 2024-08-06 | Stop reason: SDUPTHER

## 2024-08-06 RX ORDER — DEXAMETHASONE SODIUM PHOSPHATE 4 MG/ML
INJECTION, SOLUTION INTRA-ARTICULAR; INTRALESIONAL; INTRAMUSCULAR; INTRAVENOUS; SOFT TISSUE PRN
Status: DISCONTINUED | OUTPATIENT
Start: 2024-08-06 | End: 2024-08-06 | Stop reason: SDUPTHER

## 2024-08-06 RX ORDER — LABETALOL HYDROCHLORIDE 5 MG/ML
5 INJECTION, SOLUTION INTRAVENOUS
Status: DISCONTINUED | OUTPATIENT
Start: 2024-08-06 | End: 2024-08-06 | Stop reason: HOSPADM

## 2024-08-06 RX ORDER — SODIUM CHLORIDE 9 MG/ML
INJECTION, SOLUTION INTRAVENOUS CONTINUOUS PRN
Status: DISCONTINUED | OUTPATIENT
Start: 2024-08-06 | End: 2024-08-06 | Stop reason: SDUPTHER

## 2024-08-06 RX ORDER — HYDRALAZINE HYDROCHLORIDE 20 MG/ML
5 INJECTION INTRAMUSCULAR; INTRAVENOUS
Status: DISCONTINUED | OUTPATIENT
Start: 2024-08-06 | End: 2024-08-06 | Stop reason: HOSPADM

## 2024-08-06 RX ORDER — OXYCODONE HYDROCHLORIDE AND ACETAMINOPHEN 5; 325 MG/1; MG/1
1 TABLET ORAL EVERY 6 HOURS PRN
Qty: 28 TABLET | Refills: 0 | Status: SHIPPED | OUTPATIENT
Start: 2024-08-06 | End: 2024-08-13

## 2024-08-06 RX ORDER — PROCHLORPERAZINE EDISYLATE 5 MG/ML
5 INJECTION INTRAMUSCULAR; INTRAVENOUS
Status: DISCONTINUED | OUTPATIENT
Start: 2024-08-06 | End: 2024-08-06 | Stop reason: HOSPADM

## 2024-08-06 RX ORDER — METOCLOPRAMIDE HYDROCHLORIDE 5 MG/ML
10 INJECTION INTRAMUSCULAR; INTRAVENOUS ONCE
Status: COMPLETED | OUTPATIENT
Start: 2024-08-06 | End: 2024-08-06

## 2024-08-06 RX ORDER — MIDAZOLAM HYDROCHLORIDE 1 MG/ML
INJECTION INTRAMUSCULAR; INTRAVENOUS PRN
Status: DISCONTINUED | OUTPATIENT
Start: 2024-08-06 | End: 2024-08-06 | Stop reason: SDUPTHER

## 2024-08-06 RX ORDER — ROPIVACAINE HYDROCHLORIDE 5 MG/ML
INJECTION, SOLUTION EPIDURAL; INFILTRATION; PERINEURAL
Status: COMPLETED | OUTPATIENT
Start: 2024-08-06 | End: 2024-08-06

## 2024-08-06 RX ADMIN — ONDANSETRON 4 MG: 2 INJECTION INTRAMUSCULAR; INTRAVENOUS at 13:17

## 2024-08-06 RX ADMIN — WATER 3000 MG: 1 INJECTION INTRAMUSCULAR; INTRAVENOUS; SUBCUTANEOUS at 13:04

## 2024-08-06 RX ADMIN — ACETAMINOPHEN 1000 MG: 500 TABLET ORAL at 11:39

## 2024-08-06 RX ADMIN — FENTANYL CITRATE 100 MCG: 50 INJECTION, SOLUTION INTRAMUSCULAR; INTRAVENOUS at 13:12

## 2024-08-06 RX ADMIN — SODIUM CHLORIDE: 9 INJECTION, SOLUTION INTRAVENOUS at 13:04

## 2024-08-06 RX ADMIN — MIDAZOLAM HYDROCHLORIDE 1 MG: 1 INJECTION, SOLUTION INTRAMUSCULAR; INTRAVENOUS at 12:07

## 2024-08-06 RX ADMIN — PHENYLEPHRINE HYDROCHLORIDE 100 MCG: 10 INJECTION INTRAVENOUS at 13:34

## 2024-08-06 RX ADMIN — METOCLOPRAMIDE 10 MG: 5 INJECTION, SOLUTION INTRAMUSCULAR; INTRAVENOUS at 12:42

## 2024-08-06 RX ADMIN — PROPOFOL 200 MG: 10 INJECTION, EMULSION INTRAVENOUS at 13:12

## 2024-08-06 RX ADMIN — MIDAZOLAM 2 MG: 1 INJECTION INTRAMUSCULAR; INTRAVENOUS at 13:04

## 2024-08-06 RX ADMIN — FENTANYL CITRATE 50 MCG: 50 INJECTION INTRAMUSCULAR; INTRAVENOUS at 12:07

## 2024-08-06 RX ADMIN — SUGAMMADEX 200 MG: 100 INJECTION, SOLUTION INTRAVENOUS at 15:08

## 2024-08-06 RX ADMIN — DEXAMETHASONE SODIUM PHOSPHATE 10 MG: 4 INJECTION, SOLUTION INTRAMUSCULAR; INTRAVENOUS at 13:17

## 2024-08-06 RX ADMIN — KETAMINE HYDROCHLORIDE 20 MG: 10 INJECTION INTRAMUSCULAR; INTRAVENOUS at 13:11

## 2024-08-06 RX ADMIN — Medication 0.3 MG: at 13:35

## 2024-08-06 RX ADMIN — FAMOTIDINE 20 MG: 10 INJECTION, SOLUTION INTRAVENOUS at 12:41

## 2024-08-06 RX ADMIN — SODIUM CHLORIDE: 9 INJECTION, SOLUTION INTRAVENOUS at 14:07

## 2024-08-06 RX ADMIN — ROPIVACAINE HYDROCHLORIDE 30 ML: 5 INJECTION, SOLUTION EPIDURAL; INFILTRATION; PERINEURAL at 12:09

## 2024-08-06 ASSESSMENT — PAIN SCALES - GENERAL
PAINLEVEL_OUTOF10: 3
PAINLEVEL_OUTOF10: 0
PAINLEVEL_OUTOF10: 5
PAINLEVEL_OUTOF10: 0

## 2024-08-06 ASSESSMENT — PAIN DESCRIPTION - ORIENTATION
ORIENTATION: LEFT
ORIENTATION: MID

## 2024-08-06 ASSESSMENT — PAIN DESCRIPTION - DESCRIPTORS
DESCRIPTORS: THROBBING
DESCRIPTORS: DISCOMFORT

## 2024-08-06 ASSESSMENT — PAIN - FUNCTIONAL ASSESSMENT
PAIN_FUNCTIONAL_ASSESSMENT: NONE - DENIES PAIN
PAIN_FUNCTIONAL_ASSESSMENT: PREVENTS OR INTERFERES SOME ACTIVE ACTIVITIES AND ADLS

## 2024-08-06 ASSESSMENT — PAIN DESCRIPTION - LOCATION
LOCATION: SHOULDER
LOCATION: HEAD

## 2024-08-06 ASSESSMENT — LIFESTYLE VARIABLES: SMOKING_STATUS: 0

## 2024-08-06 NOTE — ANESTHESIA PRE PROCEDURE
Department of Anesthesiology  Preprocedure Note       Name:  Melisa Don   Age:  59 y.o.  :  1965                                          MRN:  [id number]         Date:  2024      Surgeon: Surgeon(s):  Terence Mack MD    Procedure: Procedure(s):  LEFT SHOULDER ARTHROSCOPY, ROTATOR CUFF REPAIR, BICEPS TENOTOMY V. TENODESIS               +++ISB+++               +++[employer]+++    Medications prior to admission:   Prior to Admission medications    Medication Sig Start Date End Date Taking? Authorizing Provider   meloxicam (MOBIC) 7.5 MG tablet Take by mouth 24   Javed Bonilla MD   MOUNJARO 2.5 MG/0.5ML SOPN SC injection 0.5 mLs 24   Javed Bonilla MD   tiZANidine (ZANAFLEX) 2 MG tablet Take 1 tablet by mouth 3 times daily as needed (muscle spasm) 24   Karissa Koenig DO   JARDIANCE 25 MG tablet Take 1 tablet by mouth daily 23   Javed Bonilla MD   vitamin B-12 (CYANOCOBALAMIN) 100 MCG tablet Take by mouth daily    Javed Bonilla MD   albuterol sulfate HFA (PROVENTIL;VENTOLIN;PROAIR) 108 (90 Base) MCG/ACT inhaler albuterol sulfate HFA 90 mcg/actuation aerosol inhaler   INHALE TWO PUFFS BY MOUTH EVERY 4 HOURS    Javed Bonilla MD   albuterol (PROVENTIL) (2.5 MG/3ML) 0.083% nebulizer solution albuterol sulfate 2.5 mg/3 mL (0.083 %) solution for nebulization   INHALE THE CONTENTS OF ONE VIAL (3ML) VIA NEBULIZER 4 TIMES A DAY.    Javed Bonilla MD   vitamin D3 (CHOLECALCIFEROL) 125 MCG (5000 UT) TABS tablet cholecalciferol (vitamin D3) 125 mcg (5,000 unit) tablet   TAKE ONE TABLET BY MOUTH EVERY DAY    Javed Bonilla MD   atenolol (TENORMIN) 25 MG tablet Take 1 tablet by mouth every morning    Javed Bonilla MD   pantoprazole (PROTONIX) 40 MG tablet Take 1 tablet by mouth every morning 11/10/16   Javed Bonilla MD   PARoxetine (PAXIL) 10 MG tablet Take 1 tablet by mouth every morning 16   Javed Bonilla MD

## 2024-08-06 NOTE — H&P
MG/0.5ML SOPN SC injection, 0.5 mLs  tiZANidine (ZANAFLEX) 2 MG tablet, Take 1 tablet by mouth 3 times daily as needed (muscle spasm)  JARDIANCE 25 MG tablet, Take 1 tablet by mouth daily  vitamin B-12 (CYANOCOBALAMIN) 100 MCG tablet, Take by mouth daily  albuterol sulfate HFA (PROVENTIL;VENTOLIN;PROAIR) 108 (90 Base) MCG/ACT inhaler, albuterol sulfate HFA 90 mcg/actuation aerosol inhaler  INHALE TWO PUFFS BY MOUTH EVERY 4 HOURS  albuterol (PROVENTIL) (2.5 MG/3ML) 0.083% nebulizer solution, albuterol sulfate 2.5 mg/3 mL (0.083 %) solution for nebulization  INHALE THE CONTENTS OF ONE VIAL (3ML) VIA NEBULIZER 4 TIMES A DAY.  vitamin D3 (CHOLECALCIFEROL) 125 MCG (5000 UT) TABS tablet, cholecalciferol (vitamin D3) 125 mcg (5,000 unit) tablet  TAKE ONE TABLET BY MOUTH EVERY DAY  atenolol (TENORMIN) 25 MG tablet, Take 1 tablet by mouth every morning  pantoprazole (PROTONIX) 40 MG tablet, Take 1 tablet by mouth every morning  PARoxetine (PAXIL) 10 MG tablet, Take 1 tablet by mouth every morning    Allergies:  Demerol hcl [meperidine] and Norco [hydrocodone-acetaminophen]    History of allergic reaction to anesthesia:  No    Social History:   TOBACCO:   reports that she has never smoked. She has never used smokeless tobacco.  ETOH:   reports no history of alcohol use.  DRUGS:   reports no history of drug use.    Family History:       Problem Relation Age of Onset    Alzheimer's Disease Father     Heart Disease Mother     Emphysema Mother     Other Other         RESPIRATORY DISEASE       REVIEW OF SYSTEMS:  CONSTITUTIONAL:  negative  RESPIRATORY:  negative  CARDIOVASCULAR:  negative  MUSCULOSKELETAL:  negative except for  HPI  NEUROLOGICAL:  negative    PHYSICAL EXAM:     VITALS:  Ht 1.702 m (5' 7\")   Wt 120.2 kg (265 lb)   BMI 41.50 kg/m²     Gen: AOx3, NAD    Heart:  normal apical pulses, normal S1 and S2 and no edema    Lungs:  No increased work of breathing, good air exchange     Abdomen:  no scars, non-distended and

## 2024-08-06 NOTE — ANESTHESIA POSTPROCEDURE EVALUATION
Department of Anesthesiology  Postprocedure Note    Patient: Melisa Don  MRN: [id number]  YOB: 1965  Date of evaluation: 8/6/2024    Procedure Summary       Date: 08/06/24 Room / Location: [address]    Anesthesia Start: 1304 Anesthesia Stop: 1528    Procedure: LEFT SHOULDER ARTHROSCOPY, ROTATOR CUFF REPAIR, BICEPS TENODESIS + ISB (Left: Shoulder) Diagnosis:       Complete tear of left rotator cuff, unspecified whether traumatic      (Complete tear of left rotator cuff, unspecified whether traumatic [M75.122])    Surgeons: Terence Mack MD Responsible Provider: Viet Maguire MD    Anesthesia Type: General ASA Status: 3            Anesthesia Type: General    Juanjo Phase I: Juanjo Score: 8    Juanjo Phase II: Juanjo Score: 10    Anesthesia Post Evaluation    Patient location during evaluation: PACU  Patient participation: complete - patient participated  Level of consciousness: awake and alert  Pain score: 2  Airway patency: patent  Nausea & Vomiting: no nausea and no vomiting  Cardiovascular status: blood pressure returned to baseline  Respiratory status: acceptable  Hydration status: euvolemic  Pain management: adequate    No notable events documented.

## 2024-08-06 NOTE — ANESTHESIA PROCEDURE NOTES
Peripheral Block    Patient location during procedure: pre-op  Reason for block: post-op pain management and at surgeon's request  Start time: 8/6/2024 12:08 PM  End time: 8/6/2024 12:10 PM  Staffing  Performed: anesthesiologist   Anesthesiologist: Crescencio Tovar DO  Performed by: Crescencio Tovar DO  Authorized by: Crescencio Tovar DO    Preanesthetic Checklist  Completed: patient identified, IV checked, site marked, risks and benefits discussed, surgical/procedural consents, equipment checked, pre-op evaluation, timeout performed, anesthesia consent given, oxygen available and monitors applied/VS acknowledged  Peripheral Block   Patient position: sitting  Prep: ChloraPrep  Provider prep: mask and sterile gloves  Patient monitoring: IV access, frequent blood pressure checks, continuous pulse ox and cardiac monitor  Block type: Brachial plexus  Interscalene  Laterality: left  Injection technique: single-shot  Guidance: ultrasound guided    Needle   Needle type: combined needle/nerve stimulator   Needle gauge: 22 G  Needle localization: anatomical landmarks and ultrasound guidance  Needle length: 10 cm  Assessment   Injection assessment: negative aspiration for heme, no paresthesia on injection and local visualized surrounding nerve on ultrasound  Paresthesia pain: none  Slow fractionated injection: yes  Hemodynamics: stable  Outcomes: uncomplicated and patient tolerated procedure well    Additional Notes  Simple uncomplicated classic left interscalene block.  Medications Administered  ropivacaine (NAROPIN) injection 0.5% - Perineural   30 mL - 8/6/2024 12:09:00 PM

## 2024-08-06 NOTE — DISCHARGE INSTRUCTIONS
Orthopaedic Discharge Instructions    Surgery: Shoulder arthroscopy     Activity:   Remain in sling at all times until follow up  You can do elbow, wrist, and hand motion exercises including squeezing ball.  No active or passive shoulder motion.    Apply ICE to the shoulder as much as possible.  It will likely be most comfortable for you to sleep sitting up in a recliner.      Medications:  Take prescribed medications as indicated.  These include pain medication, anti-inflammatory    Dressing care:  Keep your dressing on until you are seen in the office    Follow up:  Your follow up appointment is scheduled for tomorrow.  Please call [phone] with any questions     Terence Mack MD  Orthopaedic Surgery   8/6/24  11:19 AM

## 2024-08-06 NOTE — OP NOTE
N TRACY BICEP - [id number]  KIT 4.75MM LOOP N TRACY BICEP  [employer] [id number] Left 1 Implanted   ANCHOR BONE SP FBRTAK RC TGRTPE WH/BLK  - [id number]  ANCHOR BONE SP FBRTAK RC TGRTPE WH/BLK   [employer] [id number] Left 1 Implanted   ANCHOR BONE SP FBRTAK RC TGRTPE KOKI  - [id number]  ANCHOR BONE SP FBRTAK RC TGRTPE KOKI   [employer] [id number] Left 1 Implanted   ANCHOR SUTURE L 24.5 MM MISAEL 4.75 MM SUTURE SZ 2 B-TCP/ PEEK - [id number]  ANCHOR SUTURE L 24.5 MM MISAEL 4.75 MM SUTURE SZ 2 B-TCP/ PEEK  [employer] [id number] Left 1 Implanted   ANCHOR SUTURE L 24.5 MM MISAEL 4.75 MM SUTURE SZ 2 B-TCP/ PEEK - [id number]  ANCHOR SUTURE L 24.5 MM MISAEL 4.75 MM SUTURE SZ 2 B-TCP/ PEEK  [employer] [id number] Left 1 Implanted         ESTIMATED BLOOD LOSS:  5 mL    COMPLICATIONS: none    POST OPERATIVE PLAN: The patient will discharged home from PACU once stable.  Follow up in office will be post operative day 1 or 2.  Dressing will stay on and ice applied until follow up.  The patient will remain in the sling.        Terence Mack MD  Orthopaedic Surgery   8/6/24  3:45 PM

## 2024-08-07 ENCOUNTER — OFFICE VISIT (OUTPATIENT)
Dept: ORTHOPEDIC SURGERY | Age: 59
End: 2024-08-07

## 2024-08-07 VITALS — WEIGHT: 265 LBS | BODY MASS INDEX: 41.59 KG/M2 | HEIGHT: 67 IN

## 2024-08-07 DIAGNOSIS — S46.011A TRAUMATIC INCOMPLETE TEAR OF RIGHT ROTATOR CUFF, INITIAL ENCOUNTER: ICD-10-CM

## 2024-08-07 DIAGNOSIS — Z98.890 S/P LEFT ROTATOR CUFF REPAIR: Primary | ICD-10-CM

## 2024-08-07 PROCEDURE — 99024 POSTOP FOLLOW-UP VISIT: CPT | Performed by: ORTHOPAEDIC SURGERY

## 2024-08-07 NOTE — PROGRESS NOTES
[facility]   ORTHOPAEDIC SURGERY AND SPORTS MEDICINE  DATE OF VISIT: 08/08/24  Follow Up Post Operative Visit     CHIEF COMPLAINT:   Chief Complaint   Patient presents with    Post-Op Check     Pt is here POD#1 for Left shoulder arthroscopy, extensive debridement including labrum, rotator cuff, subacromial bursa, biceps tenodesis, rotator cuff repair (4 anchor speed bridge supraspinatus, single anchor subscapularis). Overall doing well.        Surgery: Left shoulder arthroscopy, extensive debridement including labrum, rotator cuff, subacromial bursa, biceps tenodesis, rotator cuff repair (4 anchor speed bridge supraspinatus, single anchor subscapularis)   Date: 08/06/2024    Subjective:    Melisa Don is here for follow up visit s/p above procedure.  She is doing well.  She reports no overnight issues.    Controlled Substances Monitoring:        Physical Exam:    Height: 1.702 m (5' 7\"), Weight - Scale: 120.2 kg (265 lb)    General: Alert and oriented x3, no acute distress  Cardiovascular/pulmonary: No labored breathing, peripheral perfusion intact  Musculoskeletal:    Left shoulder exam display stable postoperative swelling.  Neurovascular sensation is grossly intact.  Incisions are closed edges well-approximated no active drainage present.  No reported pain to the hand wrist or elbow.      Imaging: imaging obtained and reviewed showing stable postoperative changes    Assessment and Plan: Status post left shoulder arthroscopy, extensive debridement of labrum, rotator cuff, subacromial bursa, with biceps tenodesis, rotator cuff repair    Patient is postop day 1 from procedure listed above doing well.  Postoperative imaging and intraoperative pictures were reviewed with patient today.  Patient instructed to not submerge incision site until mature scars are present.  Patient can shower for basic hygiene purposes. We reviewed the post operative protocol. Patient will remain in shoulder immobilizer as directed.

## 2024-09-18 ENCOUNTER — OFFICE VISIT (OUTPATIENT)
Dept: ORTHOPEDIC SURGERY | Age: 59
End: 2024-09-18

## 2024-09-18 VITALS — BODY MASS INDEX: 41.59 KG/M2 | HEIGHT: 67 IN | WEIGHT: 265 LBS

## 2024-09-18 DIAGNOSIS — Z98.890 S/P LEFT ROTATOR CUFF REPAIR: Primary | ICD-10-CM

## 2024-09-18 PROCEDURE — 99024 POSTOP FOLLOW-UP VISIT: CPT | Performed by: ORTHOPAEDIC SURGERY

## 2024-10-26 SDOH — HEALTH STABILITY: PHYSICAL HEALTH: ON AVERAGE, HOW MANY DAYS PER WEEK DO YOU ENGAGE IN MODERATE TO STRENUOUS EXERCISE (LIKE A BRISK WALK)?: 0 DAYS

## 2024-10-29 ENCOUNTER — OFFICE VISIT (OUTPATIENT)
Dept: ORTHOPEDIC SURGERY | Age: 59
End: 2024-10-29
Payer: COMMERCIAL

## 2024-10-29 VITALS — BODY MASS INDEX: 41.59 KG/M2 | HEIGHT: 67 IN | WEIGHT: 265 LBS

## 2024-10-29 DIAGNOSIS — M25.562 ACUTE PAIN OF LEFT KNEE: Primary | ICD-10-CM

## 2024-10-29 PROCEDURE — 20610 DRAIN/INJ JOINT/BURSA W/O US: CPT | Performed by: NURSE PRACTITIONER

## 2024-10-29 PROCEDURE — 99214 OFFICE O/P EST MOD 30 MIN: CPT | Performed by: NURSE PRACTITIONER

## 2024-10-29 RX ORDER — DICLOFENAC SODIUM 75 MG/1
75 TABLET, DELAYED RELEASE ORAL 2 TIMES DAILY
Qty: 60 TABLET | Refills: 0 | Status: SHIPPED | OUTPATIENT
Start: 2024-10-29 | End: 2024-11-28

## 2024-10-29 RX ORDER — CELECOXIB 200 MG/1
200 CAPSULE ORAL 2 TIMES DAILY
Qty: 60 CAPSULE | Refills: 2 | Status: SHIPPED | OUTPATIENT
Start: 2024-10-29

## 2024-10-29 RX ORDER — TRIAMCINOLONE ACETONIDE 40 MG/ML
40 INJECTION, SUSPENSION INTRA-ARTICULAR; INTRAMUSCULAR ONCE
Status: COMPLETED | OUTPATIENT
Start: 2024-10-29 | End: 2024-10-30

## 2024-10-29 RX ORDER — BUPIVACAINE HYDROCHLORIDE 2.5 MG/ML
2 INJECTION, SOLUTION INFILTRATION; PERINEURAL ONCE
Status: COMPLETED | OUTPATIENT
Start: 2024-10-29 | End: 2024-10-30

## 2024-10-29 NOTE — PROGRESS NOTES
MD Juan at [facility] OR    FOOT SURGERY Right 03/2019    plantar    HEEL SPUR SURGERY Left 12/2016    JOINT REPLACEMENT Bilateral 09/2017    Great toe joints    JOINT REPLACEMENT Bilateral 03/2017    both big toes    PARTIAL HYSTERECTOMY (CERVIX NOT REMOVED)      SHOULDER ARTHROSCOPY Right 3/1/2023    RIGHT SHOULDER ARTHROSCOPY WITH DECOMPRESSION ROTATOR CUFF REPAIR POSSIBLE BICEPS TENODESIS performed by Dami Martinez MD at [facility] OR    SHOULDER ARTHROSCOPY Left 8/6/2024    LEFT SHOULDER ARTHROSCOPY, ROTATOR CUFF REPAIR, BICEPS TENODESIS + ISB performed by Terence Mack MD at [facility] OR         FAMILY HISTORY   Family History   Problem Relation Age of Onset    Alzheimer's Disease Father     Heart Disease Mother     Emphysema Mother     Other Other         RESPIRATORY DISEASE       SOCIAL HISTORY  Social History     Socioeconomic History    Marital status:      Spouse name: Not on file    Number of children: Not on file    Years of education: Not on file    Highest education level: Not on file   Occupational History    Not on file   Tobacco Use    Smoking status: Never    Smokeless tobacco: Never   Vaping Use    Vaping status: Never Used   Substance and Sexual Activity    Alcohol use: No    Drug use: No    Sexual activity: Defer   Other Topics Concern    Not on file   Social History Narrative    Not on file     Social Determinants of Health     Financial Resource Strain: Not on file   Food Insecurity: No Food Insecurity (4/22/2024)    Hunger Vital Sign     Worried About Running Out of Food in the Last Year: Never true     Ran Out of Food in the Last Year: Never true   Transportation Needs: No Transportation Needs (4/22/2024)    PRAPARE - Transportation     Lack of Transportation (Medical): No     Lack of Transportation (Non-Medical): No   Physical Activity: Unknown (10/26/2024)    Exercise Vital Sign     Days of Exercise per Week: 0 days     Minutes of Exercise per Session: Not on file   Stress: Not on file

## 2024-10-30 RX ADMIN — BUPIVACAINE HYDROCHLORIDE 5 MG: 2.5 INJECTION, SOLUTION INFILTRATION; PERINEURAL at 08:09

## 2024-10-30 RX ADMIN — TRIAMCINOLONE ACETONIDE 40 MG: 40 INJECTION, SUSPENSION INTRA-ARTICULAR; INTRAMUSCULAR at 08:09

## 2024-11-18 ENCOUNTER — TELEPHONE (OUTPATIENT)
Dept: ORTHOPEDIC SURGERY | Age: 59
End: 2024-11-18

## 2024-12-27 ENCOUNTER — OFFICE VISIT (OUTPATIENT)
Dept: ORTHOPEDIC SURGERY | Age: 59
End: 2024-12-27

## 2024-12-27 VITALS
HEIGHT: 67 IN | BODY MASS INDEX: 41.59 KG/M2 | RESPIRATION RATE: 20 BRPM | TEMPERATURE: 98.1 F | HEART RATE: 90 BPM | WEIGHT: 265 LBS | SYSTOLIC BLOOD PRESSURE: 134 MMHG | DIASTOLIC BLOOD PRESSURE: 90 MMHG | OXYGEN SATURATION: 96 %

## 2024-12-27 DIAGNOSIS — M17.12 OSTEOARTHRITIS OF LEFT KNEE, UNSPECIFIED OSTEOARTHRITIS TYPE: Primary | ICD-10-CM

## 2024-12-27 RX ORDER — CELECOXIB 200 MG/1
200 CAPSULE ORAL 2 TIMES DAILY
Qty: 60 CAPSULE | Refills: 2 | Status: SHIPPED | OUTPATIENT
Start: 2024-12-27

## 2024-12-27 NOTE — PROGRESS NOTES
[facility]  ORTHOPAEDICS AND SPORTS MEDICINE  DATE OF VISIT: 12/27/24  Follow Up Visit for Visco Injection    CHIEF COMPLAINT:   Chief Complaint   Patient presents with    Knee Pain     Pt is here today for a left knee Gelsyn~3 injection # 1 of 3.          Melisa Don returns for follow up visit for visco supplement injection 1.  She reports symptoms are unchanged    Physical Exam:   General: Alert and oriented x3, no acute distress  Cardiovascular/pulmonary: No labored breathing, peripheral perfusion intact  Musculoskeletal:    Left knee:    Range of motion is functional   Patella is stable tracking midline  There is no laxity with varus/valgus stress at 0 and 30 degrees of flexion.    There is no tenderness to palpation along the medial joint line.    There is no tenderness to palpation along the lateral joint line       Imaging: Reviewed     Procedure Note: Knee viscosupplementation injection     The Left knee was identified as the injection site. The risk and benefits of injection were explained and the patient consented to the injection. Under sterile conditions, the knee was injected with a full dose of Gelsyn-3. A sterile bandage was applied.    Administrations This Visit       sodium hyaluronate (Viscosup) injection SOSY 16.8 mg       Admin Date  12/27/2024 Action  Given Dose  16.8 mg Route  Intra-artICUlar Documented By  Xavier Lassiter APRN - CNP                      Assessment/Plan: S/p Left knee Gelsyn-3 injection #1 for osteoarthritis  -Continue activity modification/NSAIDS/ICE prn  -f/u next week for second injection      SOCORRO Yanes  Orthopedic Surgery   12/27/24  5:03 PM

## 2025-01-03 ENCOUNTER — OFFICE VISIT (OUTPATIENT)
Dept: ORTHOPEDIC SURGERY | Age: 60
End: 2025-01-03
Payer: COMMERCIAL

## 2025-01-03 VITALS
HEART RATE: 85 BPM | DIASTOLIC BLOOD PRESSURE: 90 MMHG | RESPIRATION RATE: 18 BRPM | WEIGHT: 265 LBS | TEMPERATURE: 97.8 F | BODY MASS INDEX: 41.59 KG/M2 | OXYGEN SATURATION: 94 % | HEIGHT: 67 IN | SYSTOLIC BLOOD PRESSURE: 138 MMHG

## 2025-01-03 DIAGNOSIS — M25.562 ACUTE PAIN OF LEFT KNEE: Primary | ICD-10-CM

## 2025-01-03 PROCEDURE — 20610 DRAIN/INJ JOINT/BURSA W/O US: CPT | Performed by: NURSE PRACTITIONER

## 2025-01-03 NOTE — PROGRESS NOTES
[facility]  ORTHOPAEDICS AND SPORTS MEDICINE  DATE OF VISIT: 01/03/25  Follow Up Visit for Visco Injection    CHIEF COMPLAINT:   Chief Complaint   Patient presents with    Injections     Pt is here today for her left knee Gelsyn~3 injections # 2 of 3.          Melisa Don returns for follow up visit for visco supplement injection 2.  She reports symptoms are unchanged    Physical Exam:   General: Alert and oriented x3, no acute distress  Cardiovascular/pulmonary: No labored breathing, peripheral perfusion intact  Musculoskeletal:    Left knee:    Range of motion is functional   Patella is stable tracking midline  There is no laxity with varus/valgus stress at 0 and 30 degrees of flexion.    There is no tenderness to palpation along the medial joint line.    There is no tenderness to palpation along the lateral joint line       Imaging: Reviewed     Procedure Note: Knee viscosupplementation injection     The Left knee was identified as the injection site. The risk and benefits of injection were explained and the patient consented to the injection. Under sterile conditions, the knee was injected with a full dose of Gelsyn-3. A sterile bandage was applied.    Administrations This Visit       sodium hyaluronate (Viscosup) injection SOSY 16.8 mg       Admin Date  01/03/2025 Action  Given Dose  16.8 mg Route  Intra-artICUlar Documented By  Xavier Lassiter APRN - CNP                      Assessment/Plan: S/p Left knee Gelsyn-3 injection #2 for osteoarthritis  -Continue activity modification/NSAIDS/ICE prn  -f/u next week for final injection      SOCORRO Yanes  Orthopedic Surgery   01/03/25  2:17 PM

## 2025-01-14 ENCOUNTER — OFFICE VISIT (OUTPATIENT)
Dept: ORTHOPEDIC SURGERY | Age: 60
End: 2025-01-14
Payer: COMMERCIAL

## 2025-01-14 VITALS
DIASTOLIC BLOOD PRESSURE: 70 MMHG | HEART RATE: 88 BPM | TEMPERATURE: 98 F | BODY MASS INDEX: 41.59 KG/M2 | HEIGHT: 67 IN | OXYGEN SATURATION: 96 % | RESPIRATION RATE: 20 BRPM | SYSTOLIC BLOOD PRESSURE: 126 MMHG | WEIGHT: 265 LBS

## 2025-01-14 DIAGNOSIS — M17.12 OSTEOARTHRITIS OF LEFT KNEE, UNSPECIFIED OSTEOARTHRITIS TYPE: Primary | ICD-10-CM

## 2025-01-14 PROCEDURE — 20610 DRAIN/INJ JOINT/BURSA W/O US: CPT | Performed by: NURSE PRACTITIONER

## 2025-01-14 NOTE — PROGRESS NOTES
[facility]  ORTHOPAEDICS AND SPORTS MEDICINE  DATE OF VISIT: 01/14/25  Follow Up Visit for Visco Injection    CHIEF COMPLAINT:   Chief Complaint   Patient presents with    Injections     Pt is here today for her left knee Gelsyn~3 injection # 3 of 3.          Melisa Don returns for follow up visit for visco supplement injection 3.  She reports symptoms are unchanged    Physical Exam:   General: Alert and oriented x3, no acute distress  Cardiovascular/pulmonary: No labored breathing, peripheral perfusion intact  Musculoskeletal:    Left knee:    Range of motion is functional   Patella is stable tracking midline  There is no laxity with varus/valgus stress at 0 and 30 degrees of flexion.    There is no tenderness to palpation along the medial joint line.    There is no tenderness to palpation along the lateral joint line       Imaging: Reviewed     Procedure Note: Knee viscosupplementation injection     The Left knee was identified as the injection site. The risk and benefits of injection were explained and the patient consented to the injection. Under sterile conditions, the knee was injected with a full dose of Gelsyn-3. A sterile bandage was applied.    Administrations This Visit       sodium hyaluronate (Viscosup) injection SOSY 16.8 mg       Admin Date  01/14/2025 Action  Given Dose  16.8 mg Route  Intra-artICUlar Documented By  Xavier Lassiter APRN - CNP                      Assessment/Plan: S/p Left knee Gelsyn-3 injection #3 for osteoarthritis  -Continue activity modification/NSAIDS/ICE prn  -f/u 3 months or as needed      SOCORRO Yanes  Orthopedic Surgery   01/14/25  3:33 PM

## 2025-01-24 DIAGNOSIS — M17.12 OSTEOARTHRITIS OF LEFT KNEE, UNSPECIFIED OSTEOARTHRITIS TYPE: Primary | ICD-10-CM

## 2025-01-24 RX ORDER — CELECOXIB 200 MG/1
200 CAPSULE ORAL 2 TIMES DAILY
Qty: 60 CAPSULE | Refills: 2 | Status: SHIPPED | OUTPATIENT
Start: 2025-01-24

## 2025-01-29 ENCOUNTER — OFFICE VISIT (OUTPATIENT)
Dept: ORTHOPEDIC SURGERY | Age: 60
End: 2025-01-29
Payer: COMMERCIAL

## 2025-01-29 VITALS
TEMPERATURE: 98 F | WEIGHT: 271 LBS | HEIGHT: 67 IN | SYSTOLIC BLOOD PRESSURE: 119 MMHG | HEART RATE: 79 BPM | DIASTOLIC BLOOD PRESSURE: 78 MMHG | OXYGEN SATURATION: 97 % | BODY MASS INDEX: 42.53 KG/M2

## 2025-01-29 DIAGNOSIS — Z98.890 S/P LEFT ROTATOR CUFF REPAIR: Primary | ICD-10-CM

## 2025-01-29 PROCEDURE — 99214 OFFICE O/P EST MOD 30 MIN: CPT | Performed by: ORTHOPAEDIC SURGERY

## 2025-01-29 PROCEDURE — 3078F DIAST BP <80 MM HG: CPT | Performed by: ORTHOPAEDIC SURGERY

## 2025-01-29 PROCEDURE — 3074F SYST BP LT 130 MM HG: CPT | Performed by: ORTHOPAEDIC SURGERY

## 2025-01-29 NOTE — PROGRESS NOTES
[facility]  ORTHOPAEDICS    Follow Up Visit     CHIEF COMPLAINT:   Chief Complaint   Patient presents with    Follow-up     6 month post op follow up Left shoulder arthroscopy, extensive debridement including labrum, rotator cuff, subacromial bursa, biceps tenodesis, rotator cuff repair (4 anchor speed bridge supraspinatus, single anchor subscapularis). DOS:08/06/24. Patient states she is doing well since surgery. Patient also states that she would like to discuss a left Total knee arthroplasty as the injections have stopped working.        Surgery: Left shoulder arthroscopy, extensive debridement including labrum, rotator cuff, subacromial bursa, biceps tenodesis, rotator cuff repair (4 anchor speed bridge supraspinatus, single anchor subscapularis)   Date: 08/06/2024      Melisa Don returns today for follow up for final evaluation of her left shoulder.  She is 6 months after proceduralist above doing well.  She had no chief complaints today.  Patient also follows up today for left knee pain.  Recently had completed viscosupplement injections which so far have not provided any symptom relief.  She would like to discuss surgical treatment options today.        Physical Exam:     Height: 1.702 m (5' 7\"), Weight - Scale: 122.9 kg (271 lb), BP: 119/78    General: Alert and oriented x3, no acute distress  Cardiovascular/pulmonary: No labored breathing, peripheral perfusion intact  Musculoskeletal:    Left shoulder exam range of motion 170/80/lumbar region.  Negative pain or weakness displayed during Jobes, speeds, Tompkins's test.  Intact belly press test.  Negative swelling deformity or tenderness.    Left knee exam range of motion 0-1 25.  Positive joint line tenderness.  Negative swelling or effusion.  Stable valgus and varus stress testing.  Patella tracks midline.      Controlled Substances Monitoring:      Imaging:    Left knee x-ray displays varus alignment knee.  Medial compartment bone-on-bone

## 2025-01-30 ENCOUNTER — TELEPHONE (OUTPATIENT)
Dept: ORTHOPEDIC SURGERY | Age: 60
End: 2025-01-30

## 2025-01-30 PROBLEM — M17.12 OSTEOARTHRITIS OF LEFT KNEE: Status: ACTIVE | Noted: 2025-01-30

## 2025-01-30 NOTE — TELEPHONE ENCOUNTER
[facility]  ORTHOPAEDIC SURGERY SCHEDULING NOTE    Patient wishes to proceed after surgery discussion with Dr. Mack.     Surgical education was discussed and patient was given the surgical handout. Patient was notified that pre-admission testing will be contacting them regarding further instructions. Patient educated that if they are having a joint replacement, they will be scheduled for a mandatory education class. Pre/post-op appointments were made as needed.     Patient is also aware to obtain any clearances prior to surgery.   Clearances required: Medical      Insurance: [employer]     *Authorization details can be found attached to the referral*     Surgery: L ODIN TKA   OR DATE: 4/15  Vendor: [facility]  Block: RAAD   CPT: 68659  DX: M17.12   Special Needs (if applicable): CT

## 2025-02-11 DIAGNOSIS — M17.12 OSTEOARTHRITIS OF LEFT KNEE, UNSPECIFIED OSTEOARTHRITIS TYPE: Primary | ICD-10-CM

## 2025-03-10 NOTE — DISCHARGE INSTRUCTIONS
DISCHARGE INSTRUCTIONS FOR TOTAL KNEE REPLACEMENT        Prevent blood clots - you are at risk post operatively for DVT (Deep vein    thrombosis and / or PE (Pulmonary Embolism)       Continue antiembolic stockings (IVON hose) for 4 weeks from date of surgery.   Remove stockings every eight hours.   Check skin for redness or any breakdowns on heels and over outer ankle bones.   Do not roll stockings down or fold over (this may cause a band of constriction    interfering with circulation and increasing your risk of a blood clot forming or a    skin breakdown.   If you have not been wearing your stockings and notice increased swelling or    excessive swelling in your extremity then: Lie down and elevate legs for 20-30   minutes.  Apply stockings.  If swelling does not improve, call office.   REMEMBER: Mild to moderate swelling of the operative limb is expected    for some time after surgery.     Take frequent walks around  your home.   Be careful outdoors- watch for uneven ground and pavement, curbs and holes.   Gradually increase your activity to prevent fatigue.   When at rest (sitting in a chair or lying down,resting) continue circulation exercises at least every hour - foot flaps, ankle rolls, quad and glut sets.      You will continue one of the following blood thinners at home.   Lovenox -  An injection once or twice a day if you have a history of blood clots,    cancer, stomach ulcers or gastrointestinal bleeding.   Aspirin - 81 mg twice daily if no history of the above ailments.   Coumadin - if held before surgery, your family doctor will be responsible for   managing and ordering this medication upon your discharge.    Incision Care:   You may shower - Your dressing is water proof.  You can shower with your dressing on.  After one week, remove your dressing and leave your incision open to air.   REMEMBER to let water roll over incision. Incision line is    healing and tender - protect from HOT water. No need

## 2025-03-13 ENCOUNTER — HOSPITAL ENCOUNTER (EMERGENCY)
Age: 60
Discharge: HOME | End: 2025-03-13
Payer: COMMERCIAL

## 2025-03-13 ENCOUNTER — APPOINTMENT (OUTPATIENT)
Dept: GENERAL RADIOLOGY | Age: 60
End: 2025-03-13
Payer: COMMERCIAL

## 2025-03-13 VITALS
DIASTOLIC BLOOD PRESSURE: 78 MMHG | RESPIRATION RATE: 16 BRPM | BODY MASS INDEX: 41.59 KG/M2 | HEART RATE: 83 BPM | SYSTOLIC BLOOD PRESSURE: 144 MMHG | WEIGHT: 265 LBS | OXYGEN SATURATION: 92 % | HEIGHT: 67 IN | TEMPERATURE: 97.3 F

## 2025-03-13 DIAGNOSIS — M25.462 KNEE EFFUSION, LEFT: Primary | ICD-10-CM

## 2025-03-13 PROCEDURE — 6370000000 HC RX 637 (ALT 250 FOR IP): Performed by: NURSE PRACTITIONER

## 2025-03-13 PROCEDURE — 73560 X-RAY EXAM OF KNEE 1 OR 2: CPT

## 2025-03-13 PROCEDURE — 99283 EMERGENCY DEPT VISIT LOW MDM: CPT

## 2025-03-13 RX ORDER — PREDNISONE 20 MG/1
40 TABLET ORAL ONCE
Status: COMPLETED | OUTPATIENT
Start: 2025-03-13 | End: 2025-03-13

## 2025-03-13 RX ORDER — PREDNISONE 20 MG/1
40 TABLET ORAL DAILY
Qty: 8 TABLET | Refills: 0 | Status: SHIPPED | OUTPATIENT
Start: 2025-03-13 | End: 2025-03-17

## 2025-03-13 RX ORDER — HYDROCODONE BITARTRATE AND ACETAMINOPHEN 5; 325 MG/1; MG/1
1 TABLET ORAL ONCE
Status: COMPLETED | OUTPATIENT
Start: 2025-03-13 | End: 2025-03-13

## 2025-03-13 RX ADMIN — HYDROCODONE BITARTRATE AND ACETAMINOPHEN 1 TABLET: 5; 325 TABLET ORAL at 03:26

## 2025-03-13 RX ADMIN — PREDNISONE 40 MG: 20 TABLET ORAL at 03:26

## 2025-03-13 ASSESSMENT — LIFESTYLE VARIABLES
HOW OFTEN DO YOU HAVE A DRINK CONTAINING ALCOHOL: NEVER
HOW MANY STANDARD DRINKS CONTAINING ALCOHOL DO YOU HAVE ON A TYPICAL DAY: PATIENT DOES NOT DRINK

## 2025-03-13 ASSESSMENT — PAIN - FUNCTIONAL ASSESSMENT: PAIN_FUNCTIONAL_ASSESSMENT: ACTIVITIES ARE NOT PREVENTED

## 2025-03-13 ASSESSMENT — PAIN SCALES - GENERAL: PAINLEVEL_OUTOF10: 10

## 2025-03-13 NOTE — ED PROVIDER NOTES
Independent ANA Visit.     [facility]  Department of Emergency Medicine   ED  Encounter Note  Admit Date/RoomTime: 3/13/2025  2:29 AM  ED Room:     NAME: Melisa Don  : 1965  MRN: [id number]     Chief Complaint:  Joint Swelling and Knee Pain (Left knee pain/swelling for 2 days. Scheduled for knee replacement )    History of Present Illness       Melisa Don is a 60 y.o. old female who presents to the emergency department by private vehicle, for non-traumatic left knee pain ans swelling for the past 2 days.  She is scheduled for a knee replacement next month. She said she contacted her orthopedic doctor and he recommended Tylenol, ice and elevation but she has not had any improvement.      ROS   Pertinent positives and negatives are stated within HPI, all other systems reviewed and are negative.    Past Medical History:  has a past medical history of Diabetes mellitus (HCC), GERD (gastroesophageal reflux disease), Hypertension, IBS (irritable bowel syndrome), Prolonged emergence from general anesthesia, Seasonal allergies, and Whooping cough.    Surgical History:  has a past surgical history that includes Cholecystectomy (2011); Partial hysterectomy; Heel spur surgery (Left, 2016); joint replacement (Bilateral, 2017); joint replacement (Bilateral, 2017); Foot surgery (Right, 2019); Finger trigger release (Left, 2022); Shoulder arthroscopy (Right, 3/1/2023); and Shoulder arthroscopy (Left, 2024).    Social History:  reports that she has never smoked. She has never used smokeless tobacco. She reports that she does not drink alcohol and does not use drugs.    Family History: family history includes Alzheimer's Disease in her father; Emphysema in her mother; Heart Disease in her mother; Other in an other family member.     Allergies: Demerol hcl [meperidine] and Norco [hydrocodone-acetaminophen]    Physical Exam   Oxygen Saturation Interpretation:

## 2025-03-27 ENCOUNTER — HOSPITAL ENCOUNTER (OUTPATIENT)
Dept: CT IMAGING | Age: 60
Discharge: HOME | End: 2025-03-29
Payer: COMMERCIAL

## 2025-03-27 DIAGNOSIS — M17.12 OSTEOARTHRITIS OF LEFT KNEE, UNSPECIFIED OSTEOARTHRITIS TYPE: ICD-10-CM

## 2025-03-27 PROCEDURE — 73700 CT LOWER EXTREMITY W/O DYE: CPT

## 2025-04-07 ENCOUNTER — OFFICE VISIT (OUTPATIENT)
Dept: ORTHOPEDIC SURGERY | Age: 60
End: 2025-04-07
Payer: COMMERCIAL

## 2025-04-07 VITALS
HEART RATE: 88 BPM | HEIGHT: 67 IN | TEMPERATURE: 98.2 F | BODY MASS INDEX: 41.59 KG/M2 | WEIGHT: 265 LBS | SYSTOLIC BLOOD PRESSURE: 130 MMHG | OXYGEN SATURATION: 99 % | DIASTOLIC BLOOD PRESSURE: 74 MMHG | RESPIRATION RATE: 20 BRPM

## 2025-04-07 DIAGNOSIS — M17.12 OSTEOARTHRITIS OF LEFT KNEE, UNSPECIFIED OSTEOARTHRITIS TYPE: Primary | ICD-10-CM

## 2025-04-07 PROCEDURE — 3078F DIAST BP <80 MM HG: CPT | Performed by: ORTHOPAEDIC SURGERY

## 2025-04-07 PROCEDURE — 99214 OFFICE O/P EST MOD 30 MIN: CPT | Performed by: ORTHOPAEDIC SURGERY

## 2025-04-07 PROCEDURE — 3075F SYST BP GE 130 - 139MM HG: CPT | Performed by: ORTHOPAEDIC SURGERY

## 2025-04-07 NOTE — PROGRESS NOTES
Department of Orthopedic Surgery  Attending Pre-operative History and Physical        DIAGNOSIS: Left knee end-stage osteoarthritis    INDICATION: Failed conservative treatment    PROCEDURE: Left knee Tarun robot assisted total knee arthroplasty    CHIEF COMPLAINT: Left knee pain    History Obtained From:  patient    HISTORY OF PRESENT ILLNESS:      The patient is a 60 y.o. female with significant past medical history of end-stage osteoarthritis of the knee that has been refractory to conservative treatment.  For these reasons the patient is interested in surgical management which we discussed would involve Tarun robot assisted total knee arthroplasty.  Risks of surgery were discussed in detail including but not limited to infection, neurovascular injury, DVT/pulmonary embolus, arthrofibrosis, need for revision surgery, loss of limb, death from anesthesia, unforeseen risks.  We also discussed the anticipated rehab protocol and need for physical therapy.  The patient understands and would like to proceed.      Past Medical History:        Diagnosis Date    Diabetes mellitus (HCC)     GERD (gastroesophageal reflux disease)     Hypertension     IBS (irritable bowel syndrome)     Prolonged emergence from general anesthesia     Seasonal allergies     Whooping cough 2020       Past Surgical History:        Procedure Laterality Date    CHOLECYSTECTOMY  11/11/2011    FINGER TRIGGER RELEASE Left 12/28/2022    LEFT THUMB TRIGGER  RELEASE LEFT INDEX AND MIDDLE FINGER TRIGGER RELEASE LEFT CARPAL TUNNEL RELEASE performed by Dami Martinez MD at [facility] OR    FOOT SURGERY Right 03/2019    plantar    HEEL SPUR SURGERY Left 12/2016    JOINT REPLACEMENT Bilateral 09/2017    Great toe joints    JOINT REPLACEMENT Bilateral 03/2017    both big toes    PARTIAL HYSTERECTOMY (CERVIX NOT REMOVED)      SHOULDER ARTHROSCOPY Right 3/1/2023    RIGHT SHOULDER ARTHROSCOPY WITH DECOMPRESSION ROTATOR CUFF REPAIR POSSIBLE BICEPS TENODESIS performed by

## 2025-04-08 ENCOUNTER — HOSPITAL ENCOUNTER (OUTPATIENT)
Dept: PREADMISSION TESTING | Age: 60
Discharge: HOME | End: 2025-04-08
Payer: COMMERCIAL

## 2025-04-08 VITALS
WEIGHT: 265 LBS | HEIGHT: 66 IN | TEMPERATURE: 97.9 F | DIASTOLIC BLOOD PRESSURE: 76 MMHG | HEART RATE: 73 BPM | OXYGEN SATURATION: 94 % | SYSTOLIC BLOOD PRESSURE: 109 MMHG | RESPIRATION RATE: 18 BRPM | BODY MASS INDEX: 42.59 KG/M2

## 2025-04-08 DIAGNOSIS — Z01.818 PRE-OP TESTING: ICD-10-CM

## 2025-04-08 LAB — PREALB SERPL-MCNC: 25 MG/DL (ref 20–40)

## 2025-04-08 PROCEDURE — 84134 ASSAY OF PREALBUMIN: CPT

## 2025-04-08 PROCEDURE — 87081 CULTURE SCREEN ONLY: CPT

## 2025-04-08 PROCEDURE — 36415 COLL VENOUS BLD VENIPUNCTURE: CPT

## 2025-04-08 RX ORDER — OMEPRAZOLE 20 MG/1
20 CAPSULE, DELAYED RELEASE ORAL DAILY
COMMUNITY

## 2025-04-08 ASSESSMENT — KOOS JR
TWISING OR PIVOTING ON KNEE: EXTREME
BENDING TO THE FLOOR TO PICK UP OBJECT: EXTREME
STRAIGHTENING KNEE FULLY: MILD
HOW SEVERE IS YOUR KNEE STIFFNESS AFTER FIRST WAKING IN MORNING: MODERATE
KOOS JR TOTAL INTERVAL SCORE: 39.625
STANDING UPRIGHT: MODERATE
RISING FROM SITTING: MODERATE
GOING UP OR DOWN STAIRS: EXTREME

## 2025-04-08 ASSESSMENT — PAIN - FUNCTIONAL ASSESSMENT: PAIN_FUNCTIONAL_ASSESSMENT: PREVENTS OR INTERFERES SOME ACTIVE ACTIVITIES AND ADLS

## 2025-04-08 ASSESSMENT — PAIN DESCRIPTION - FREQUENCY: FREQUENCY: CONTINUOUS

## 2025-04-08 ASSESSMENT — PAIN SCALES - GENERAL: PAINLEVEL_OUTOF10: 4

## 2025-04-08 ASSESSMENT — PAIN DESCRIPTION - DESCRIPTORS: DESCRIPTORS: ACHING;SHARP

## 2025-04-08 ASSESSMENT — PAIN DESCRIPTION - LOCATION: LOCATION: KNEE

## 2025-04-08 ASSESSMENT — PAIN DESCRIPTION - PAIN TYPE: TYPE: CHRONIC PAIN

## 2025-04-08 ASSESSMENT — PAIN DESCRIPTION - ORIENTATION: ORIENTATION: LEFT

## 2025-04-08 NOTE — PROGRESS NOTES
[facility] PRE-ADMISSION TESTING INSTRUCTIONS    The Preadmission Testing patient is instructed accordingly using the following criteria (check applicable):    ARRIVAL INSTRUCTIONS:  [x] Parking the day of Surgery is located in the Main Entrance lot.  Upon entering the door, make an immediate right to the surgery reception desk    [x] Bring photo ID and insurance card    [x] Bring in a copy of Living will or Durable Power of  papers.    [x] Please be sure to arrange for responsible adult to provide transportation to and from the hospital    [x] Please arrange for responsible adult to be with you for the 24 hour period post procedure due to having anesthesia    [x] If you awake am of surgery not feeling well or have temperature >100 please call [phone]    GENERAL INSTRUCTIONS:    [x] Follow instructions for hydration that have been provided to you at your Pre-Admission Visit. Solid food until midnight then clear liquids. No gum, candy or mints.    [x] You may brush your teeth    [x] Take medications as instructed    [x] Stop herbal supplements and vitamins 5 days prior to procedure    [] Follow preop dosing of blood thinners per physician instructions    [] Take 1/2 dose of evening insulin, but no insulin after midnight    [] No oral diabetic medications after midnight    [x] If diabetic and have low blood sugar or feel symptomatic, take 1-2oz apple juice only    [x] Bring inhalers day of surgery    [x] No tobacco products within 24 hours of surgery     [x] No alcohol or illegal drug use within 24 hours of surgery.    [x] Jewelry, body piercing's, eyeglasses, contact lenses and dentures are not permitted into surgery (bring cases)      [x] Please do not wear any nail polish, make up or hair products on the day of surgery    [x] You can expect a call the business day prior to procedure to notify you if your arrival time changes    [x] If you receive a survey after surgery we

## 2025-04-08 NOTE — DISCHARGE INSTRUCTIONS
Erb, Geovanna, RN  Registered Nurse     Progress Notes     Signed     Date of Service: 4/8/2025  8:00 AM     Signed         [facility] PRE-ADMISSION TESTING INSTRUCTIONS     The Preadmission Testing patient is instructed accordingly using the following criteria (check applicable):     ARRIVAL INSTRUCTIONS:  [x] Parking the day of Surgery is located in the Main Entrance lot.  Upon entering the door, make an immediate right to the surgery reception desk     [x] Bring photo ID and insurance card     [x] Bring in a copy of Living will or Durable Power of  papers.     [x] Please be sure to arrange for responsible adult to provide transportation to and from the hospital     [x] Please arrange for responsible adult to be with you for the 24 hour period post procedure due to having anesthesia     [x] If you awake am of surgery not feeling well or have temperature >100 please call [phone]     GENERAL INSTRUCTIONS:     [x] Follow instructions for hydration that have been provided to you at your Pre-Admission Visit. Solid food until midnight then clear liquids. No gum, candy or mints.     [x] You may brush your teeth     [x] Take medications as instructed     [x] Stop herbal supplements and vitamins 5 days prior to procedure     [] Follow preop dosing of blood thinners per physician instructions     [] Take 1/2 dose of evening insulin, but no insulin after midnight     [] No oral diabetic medications after midnight     [x] If diabetic and have low blood sugar or feel symptomatic, take 1-2oz apple juice only     [x] Bring inhalers day of surgery     [x] No tobacco products within 24 hours of surgery      [x] No alcohol or illegal drug use within 24 hours of surgery.     [x] Jewelry, body piercing's, eyeglasses, contact lenses and dentures are not permitted into surgery (bring cases)                            [x] Please do not wear any nail polish, make up or hair products on the day of

## 2025-04-09 LAB
MICROORGANISM SPEC CULT: NORMAL
SPECIMEN DESCRIPTION: NORMAL

## 2025-04-14 ENCOUNTER — ANESTHESIA EVENT (OUTPATIENT)
Dept: OPERATING ROOM | Age: 60
End: 2025-04-14
Payer: COMMERCIAL

## 2025-04-14 RX ORDER — MIDAZOLAM HYDROCHLORIDE 2 MG/2ML
1 INJECTION, SOLUTION INTRAMUSCULAR; INTRAVENOUS ONCE
Status: CANCELLED | OUTPATIENT
Start: 2025-04-14 | End: 2025-04-14

## 2025-04-15 ENCOUNTER — HOSPITAL ENCOUNTER (OUTPATIENT)
Age: 60
Setting detail: OBSERVATION
Discharge: HOME HEALTH | End: 2025-04-16
Attending: ORTHOPAEDIC SURGERY | Admitting: ORTHOPAEDIC SURGERY
Payer: COMMERCIAL

## 2025-04-15 ENCOUNTER — APPOINTMENT (OUTPATIENT)
Dept: GENERAL RADIOLOGY | Age: 60
End: 2025-04-15
Attending: ORTHOPAEDIC SURGERY
Payer: COMMERCIAL

## 2025-04-15 ENCOUNTER — ANESTHESIA (OUTPATIENT)
Dept: OPERATING ROOM | Age: 60
End: 2025-04-15
Payer: COMMERCIAL

## 2025-04-15 DIAGNOSIS — M17.12 OSTEOARTHRITIS OF LEFT KNEE: ICD-10-CM

## 2025-04-15 DIAGNOSIS — Z96.652 STATUS POST LEFT KNEE REPLACEMENT: ICD-10-CM

## 2025-04-15 DIAGNOSIS — Z01.818 PRE-OP TESTING: Primary | ICD-10-CM

## 2025-04-15 LAB — GLUCOSE BLD-MCNC: 126 MG/DL (ref 74–99)

## 2025-04-15 PROCEDURE — 6360000002 HC RX W HCPCS: Performed by: ANESTHESIOLOGY

## 2025-04-15 PROCEDURE — 6360000002 HC RX W HCPCS: Performed by: ORTHOPAEDIC SURGERY

## 2025-04-15 PROCEDURE — 7100000001 HC PACU RECOVERY - ADDTL 15 MIN: Performed by: ORTHOPAEDIC SURGERY

## 2025-04-15 PROCEDURE — 97530 THERAPEUTIC ACTIVITIES: CPT

## 2025-04-15 PROCEDURE — 6370000000 HC RX 637 (ALT 250 FOR IP): Performed by: NURSE PRACTITIONER

## 2025-04-15 PROCEDURE — 88305 TISSUE EXAM BY PATHOLOGIST: CPT

## 2025-04-15 PROCEDURE — 2709999900 HC NON-CHARGEABLE SUPPLY: Performed by: ORTHOPAEDIC SURGERY

## 2025-04-15 PROCEDURE — 2720000010 HC SURG SUPPLY STERILE: Performed by: ORTHOPAEDIC SURGERY

## 2025-04-15 PROCEDURE — 3700000001 HC ADD 15 MINUTES (ANESTHESIA): Performed by: ORTHOPAEDIC SURGERY

## 2025-04-15 PROCEDURE — 27447 TOTAL KNEE ARTHROPLASTY: CPT | Performed by: NURSE PRACTITIONER

## 2025-04-15 PROCEDURE — 6370000000 HC RX 637 (ALT 250 FOR IP): Performed by: ANESTHESIOLOGY

## 2025-04-15 PROCEDURE — 99242 OFF/OP CONSLTJ NEW/EST SF 20: CPT | Performed by: ORTHOPAEDIC SURGERY

## 2025-04-15 PROCEDURE — 82962 GLUCOSE BLOOD TEST: CPT

## 2025-04-15 PROCEDURE — 6360000002 HC RX W HCPCS: Performed by: NURSE ANESTHETIST, CERTIFIED REGISTERED

## 2025-04-15 PROCEDURE — C1713 ANCHOR/SCREW BN/BN,TIS/BN: HCPCS | Performed by: ORTHOPAEDIC SURGERY

## 2025-04-15 PROCEDURE — 2500000003 HC RX 250 WO HCPCS: Performed by: NURSE ANESTHETIST, CERTIFIED REGISTERED

## 2025-04-15 PROCEDURE — 94640 AIRWAY INHALATION TREATMENT: CPT

## 2025-04-15 PROCEDURE — 97165 OT EVAL LOW COMPLEX 30 MIN: CPT

## 2025-04-15 PROCEDURE — 6370000000 HC RX 637 (ALT 250 FOR IP): Performed by: ORTHOPAEDIC SURGERY

## 2025-04-15 PROCEDURE — 3600000005 HC SURGERY LEVEL 5 BASE: Performed by: ORTHOPAEDIC SURGERY

## 2025-04-15 PROCEDURE — 7100000000 HC PACU RECOVERY - FIRST 15 MIN: Performed by: ORTHOPAEDIC SURGERY

## 2025-04-15 PROCEDURE — 73560 X-RAY EXAM OF KNEE 1 OR 2: CPT

## 2025-04-15 PROCEDURE — G0378 HOSPITAL OBSERVATION PER HR: HCPCS

## 2025-04-15 PROCEDURE — 3600000015 HC SURGERY LEVEL 5 ADDTL 15MIN: Performed by: ORTHOPAEDIC SURGERY

## 2025-04-15 PROCEDURE — 0055T BONE SRGRY CMPTR CT/MRI IMAG: CPT | Performed by: ORTHOPAEDIC SURGERY

## 2025-04-15 PROCEDURE — 27447 TOTAL KNEE ARTHROPLASTY: CPT | Performed by: ORTHOPAEDIC SURGERY

## 2025-04-15 PROCEDURE — 2500000003 HC RX 250 WO HCPCS: Performed by: NURSE PRACTITIONER

## 2025-04-15 PROCEDURE — 97161 PT EVAL LOW COMPLEX 20 MIN: CPT

## 2025-04-15 PROCEDURE — 2580000003 HC RX 258: Performed by: ORTHOPAEDIC SURGERY

## 2025-04-15 PROCEDURE — 6360000002 HC RX W HCPCS

## 2025-04-15 PROCEDURE — 3700000000 HC ANESTHESIA ATTENDED CARE: Performed by: ORTHOPAEDIC SURGERY

## 2025-04-15 PROCEDURE — 6360000002 HC RX W HCPCS: Performed by: NURSE PRACTITIONER

## 2025-04-15 PROCEDURE — 2500000003 HC RX 250 WO HCPCS: Performed by: ORTHOPAEDIC SURGERY

## 2025-04-15 PROCEDURE — C1776 JOINT DEVICE (IMPLANTABLE): HCPCS | Performed by: ORTHOPAEDIC SURGERY

## 2025-04-15 PROCEDURE — 64447 NJX AA&/STRD FEMORAL NRV IMG: CPT | Performed by: ANESTHESIOLOGY

## 2025-04-15 PROCEDURE — 88311 DECALCIFY TISSUE: CPT

## 2025-04-15 DEVICE — ASYMMETRIC PATELLA
Type: IMPLANTABLE DEVICE | Site: PATELLA | Status: FUNCTIONAL
Brand: TRIATHLON

## 2025-04-15 DEVICE — INSERT TIB CR 3 10 MM KNEE X3 TRIATHLON: Type: IMPLANTABLE DEVICE | Site: KNEE | Status: FUNCTIONAL

## 2025-04-15 DEVICE — CRUCIATE RETAINING FEMORAL
Type: IMPLANTABLE DEVICE | Site: FEMUR | Status: FUNCTIONAL
Brand: TRIATHLON

## 2025-04-15 DEVICE — SIMPLEX® HV IS A FAST-SETTING ACRYLIC RESIN FOR USE IN BONE SURGERY. MIXING THE TWO SEPARATE STERILE COMPONENTS PRODUCES A DUCTILE BONE CEMENT WHICH, AFTER HARDENING, FIXES THE IMPLANT AND TRANSFERS STRESSES PRODUCED DURING MOVEMENT EVENLY TO THE BONE. SIMPLEX® HV CEMENT POWDER ALSO CONTAINS INSOLUBLE ZIRCONIUM DIOXIDE AS AN X-RAY CONTRAST MEDIUM. SIMPLEX® HV DOES NOT EMIT A SIGNAL AND DOES NOT POSE A SAFETY RISK IN A MAGNETIC RESONANCE ENVIRONMENT.
Type: IMPLANTABLE DEVICE | Site: KNEE | Status: FUNCTIONAL
Brand: SIMPLEX HV

## 2025-04-15 DEVICE — TIBIAL COMPONENT
Type: IMPLANTABLE DEVICE | Site: TIBIA | Status: FUNCTIONAL
Brand: TRIATHLON

## 2025-04-15 RX ORDER — KETOROLAC TROMETHAMINE 30 MG/ML
30 INJECTION, SOLUTION INTRAMUSCULAR; INTRAVENOUS
Status: DISCONTINUED | OUTPATIENT
Start: 2025-04-15 | End: 2025-04-15

## 2025-04-15 RX ORDER — ONDANSETRON 4 MG/1
4 TABLET, ORALLY DISINTEGRATING ORAL EVERY 8 HOURS PRN
Status: DISCONTINUED | OUTPATIENT
Start: 2025-04-15 | End: 2025-04-16 | Stop reason: HOSPADM

## 2025-04-15 RX ORDER — TRANEXAMIC ACID 10 MG/ML
1000 INJECTION, SOLUTION INTRAVENOUS ONCE
Status: COMPLETED | OUTPATIENT
Start: 2025-04-15 | End: 2025-04-16

## 2025-04-15 RX ORDER — SODIUM CHLORIDE 0.9 % (FLUSH) 0.9 %
5-40 SYRINGE (ML) INJECTION EVERY 12 HOURS SCHEDULED
Status: DISCONTINUED | OUTPATIENT
Start: 2025-04-15 | End: 2025-04-16 | Stop reason: HOSPADM

## 2025-04-15 RX ORDER — VANCOMYCIN HYDROCHLORIDE 1 G/20ML
INJECTION, POWDER, LYOPHILIZED, FOR SOLUTION INTRAVENOUS PRN
Status: DISCONTINUED | OUTPATIENT
Start: 2025-04-15 | End: 2025-04-15 | Stop reason: ALTCHOICE

## 2025-04-15 RX ORDER — PROCHLORPERAZINE EDISYLATE 5 MG/ML
5 INJECTION INTRAMUSCULAR; INTRAVENOUS
Status: COMPLETED | OUTPATIENT
Start: 2025-04-15 | End: 2025-04-15

## 2025-04-15 RX ORDER — SODIUM CHLORIDE 9 MG/ML
INJECTION, SOLUTION INTRAVENOUS PRN
Status: DISCONTINUED | OUTPATIENT
Start: 2025-04-15 | End: 2025-04-16 | Stop reason: HOSPADM

## 2025-04-15 RX ORDER — KETOROLAC TROMETHAMINE 15 MG/ML
15 INJECTION, SOLUTION INTRAMUSCULAR; INTRAVENOUS EVERY 6 HOURS
Status: DISCONTINUED | OUTPATIENT
Start: 2025-04-15 | End: 2025-04-16 | Stop reason: HOSPADM

## 2025-04-15 RX ORDER — ATENOLOL 50 MG/1
25 TABLET ORAL EVERY MORNING
Status: DISCONTINUED | OUTPATIENT
Start: 2025-04-16 | End: 2025-04-16 | Stop reason: HOSPADM

## 2025-04-15 RX ORDER — CELECOXIB 100 MG/1
200 CAPSULE ORAL ONCE
Status: COMPLETED | OUTPATIENT
Start: 2025-04-15 | End: 2025-04-15

## 2025-04-15 RX ORDER — PROPOFOL 10 MG/ML
INJECTION, EMULSION INTRAVENOUS
Status: DISCONTINUED | OUTPATIENT
Start: 2025-04-15 | End: 2025-04-15 | Stop reason: SDUPTHER

## 2025-04-15 RX ORDER — OXYCODONE HYDROCHLORIDE 5 MG/1
10 TABLET ORAL EVERY 4 HOURS PRN
Status: DISCONTINUED | OUTPATIENT
Start: 2025-04-15 | End: 2025-04-16 | Stop reason: HOSPADM

## 2025-04-15 RX ORDER — ALBUTEROL SULFATE 0.83 MG/ML
2.5 SOLUTION RESPIRATORY (INHALATION)
Status: DISCONTINUED | OUTPATIENT
Start: 2025-04-15 | End: 2025-04-15

## 2025-04-15 RX ORDER — EPHEDRINE SULFATE/0.9% NACL/PF 25 MG/5 ML
SYRINGE (ML) INTRAVENOUS
Status: DISCONTINUED | OUTPATIENT
Start: 2025-04-15 | End: 2025-04-15 | Stop reason: SDUPTHER

## 2025-04-15 RX ORDER — ROCURONIUM BROMIDE 10 MG/ML
INJECTION, SOLUTION INTRAVENOUS
Status: DISCONTINUED | OUTPATIENT
Start: 2025-04-15 | End: 2025-04-15 | Stop reason: SDUPTHER

## 2025-04-15 RX ORDER — ALBUTEROL SULFATE 90 UG/1
2 INHALANT RESPIRATORY (INHALATION) EVERY 4 HOURS PRN
Status: DISCONTINUED | OUTPATIENT
Start: 2025-04-15 | End: 2025-04-15 | Stop reason: SDUPTHER

## 2025-04-15 RX ORDER — IPRATROPIUM BROMIDE AND ALBUTEROL SULFATE 2.5; .5 MG/3ML; MG/3ML
1 SOLUTION RESPIRATORY (INHALATION)
Status: DISCONTINUED | OUTPATIENT
Start: 2025-04-15 | End: 2025-04-15 | Stop reason: HOSPADM

## 2025-04-15 RX ORDER — OXYCODONE HYDROCHLORIDE 5 MG/1
5 TABLET ORAL PRN
Status: DISCONTINUED | OUTPATIENT
Start: 2025-04-15 | End: 2025-04-15 | Stop reason: HOSPADM

## 2025-04-15 RX ORDER — FENTANYL CITRATE 50 UG/ML
25 INJECTION, SOLUTION INTRAMUSCULAR; INTRAVENOUS PRN
Status: DISCONTINUED | OUTPATIENT
Start: 2025-04-15 | End: 2025-04-15 | Stop reason: HOSPADM

## 2025-04-15 RX ORDER — ONDANSETRON 2 MG/ML
4 INJECTION INTRAMUSCULAR; INTRAVENOUS EVERY 6 HOURS PRN
Status: DISCONTINUED | OUTPATIENT
Start: 2025-04-15 | End: 2025-04-16 | Stop reason: HOSPADM

## 2025-04-15 RX ORDER — LIDOCAINE HYDROCHLORIDE 20 MG/ML
INJECTION, SOLUTION EPIDURAL; INFILTRATION; INTRACAUDAL; PERINEURAL
Status: DISCONTINUED | OUTPATIENT
Start: 2025-04-15 | End: 2025-04-15 | Stop reason: SDUPTHER

## 2025-04-15 RX ORDER — TRANEXAMIC ACID 10 MG/ML
1000 INJECTION, SOLUTION INTRAVENOUS
Status: COMPLETED | OUTPATIENT
Start: 2025-04-15 | End: 2025-04-15

## 2025-04-15 RX ORDER — ASPIRIN 81 MG/1
81 TABLET ORAL 2 TIMES DAILY
Status: DISCONTINUED | OUTPATIENT
Start: 2025-04-15 | End: 2025-04-16 | Stop reason: HOSPADM

## 2025-04-15 RX ORDER — ACETAMINOPHEN 325 MG/1
650 TABLET ORAL EVERY 6 HOURS
Status: DISCONTINUED | OUTPATIENT
Start: 2025-04-15 | End: 2025-04-16 | Stop reason: HOSPADM

## 2025-04-15 RX ORDER — MIDAZOLAM HYDROCHLORIDE 1 MG/ML
INJECTION, SOLUTION INTRAMUSCULAR; INTRAVENOUS
Status: COMPLETED
Start: 2025-04-15 | End: 2025-04-15

## 2025-04-15 RX ORDER — ONDANSETRON 2 MG/ML
INJECTION INTRAMUSCULAR; INTRAVENOUS
Status: DISCONTINUED | OUTPATIENT
Start: 2025-04-15 | End: 2025-04-15 | Stop reason: SDUPTHER

## 2025-04-15 RX ORDER — OXYCODONE AND ACETAMINOPHEN 5; 325 MG/1; MG/1
1 TABLET ORAL EVERY 6 HOURS PRN
Qty: 28 TABLET | Refills: 0 | Status: SHIPPED | OUTPATIENT
Start: 2025-04-15 | End: 2025-04-22

## 2025-04-15 RX ORDER — SODIUM CHLORIDE 0.9 % (FLUSH) 0.9 %
5-40 SYRINGE (ML) INJECTION PRN
Status: DISCONTINUED | OUTPATIENT
Start: 2025-04-15 | End: 2025-04-15 | Stop reason: HOSPADM

## 2025-04-15 RX ORDER — ROPIVACAINE HYDROCHLORIDE 5 MG/ML
INJECTION, SOLUTION EPIDURAL; INFILTRATION; PERINEURAL
Status: DISPENSED
Start: 2025-04-15 | End: 2025-04-15

## 2025-04-15 RX ORDER — DEXTROSE MONOHYDRATE 100 MG/ML
INJECTION, SOLUTION INTRAVENOUS CONTINUOUS PRN
Status: DISCONTINUED | OUTPATIENT
Start: 2025-04-15 | End: 2025-04-16 | Stop reason: HOSPADM

## 2025-04-15 RX ORDER — OXYCODONE HYDROCHLORIDE 5 MG/1
5 TABLET ORAL EVERY 4 HOURS PRN
Status: DISCONTINUED | OUTPATIENT
Start: 2025-04-15 | End: 2025-04-16 | Stop reason: HOSPADM

## 2025-04-15 RX ORDER — MIDAZOLAM HYDROCHLORIDE 2 MG/2ML
1 INJECTION, SOLUTION INTRAMUSCULAR; INTRAVENOUS EVERY 5 MIN PRN
Status: DISCONTINUED | OUTPATIENT
Start: 2025-04-15 | End: 2025-04-15 | Stop reason: HOSPADM

## 2025-04-15 RX ORDER — METHOCARBAMOL 100 MG/ML
1000 INJECTION, SOLUTION INTRAMUSCULAR; INTRAVENOUS PRN
Status: DISCONTINUED | OUTPATIENT
Start: 2025-04-15 | End: 2025-04-16 | Stop reason: HOSPADM

## 2025-04-15 RX ORDER — PHENYLEPHRINE HCL IN 0.9% NACL 1 MG/10 ML
SYRINGE (ML) INTRAVENOUS
Status: DISCONTINUED | OUTPATIENT
Start: 2025-04-15 | End: 2025-04-15 | Stop reason: SDUPTHER

## 2025-04-15 RX ORDER — ACETAMINOPHEN 500 MG
1000 TABLET ORAL ONCE
Status: COMPLETED | OUTPATIENT
Start: 2025-04-15 | End: 2025-04-15

## 2025-04-15 RX ORDER — ASPIRIN 81 MG/1
81 TABLET ORAL 2 TIMES DAILY
Qty: 56 TABLET | Refills: 0 | Status: SHIPPED | OUTPATIENT
Start: 2025-04-15 | End: 2025-05-13

## 2025-04-15 RX ORDER — KETOROLAC TROMETHAMINE 30 MG/ML
INJECTION, SOLUTION INTRAMUSCULAR; INTRAVENOUS
Status: DISCONTINUED | OUTPATIENT
Start: 2025-04-15 | End: 2025-04-15 | Stop reason: SDUPTHER

## 2025-04-15 RX ORDER — OXYCODONE HYDROCHLORIDE 5 MG/1
10 TABLET ORAL PRN
Status: DISCONTINUED | OUTPATIENT
Start: 2025-04-15 | End: 2025-04-15 | Stop reason: HOSPADM

## 2025-04-15 RX ORDER — SODIUM CHLORIDE 0.9 % (FLUSH) 0.9 %
5-40 SYRINGE (ML) INJECTION EVERY 12 HOURS SCHEDULED
Status: DISCONTINUED | OUTPATIENT
Start: 2025-04-15 | End: 2025-04-15 | Stop reason: HOSPADM

## 2025-04-15 RX ORDER — SODIUM CHLORIDE 9 MG/ML
INJECTION, SOLUTION INTRAVENOUS PRN
Status: DISCONTINUED | OUTPATIENT
Start: 2025-04-15 | End: 2025-04-15 | Stop reason: HOSPADM

## 2025-04-15 RX ORDER — ROPIVACAINE HYDROCHLORIDE 5 MG/ML
INJECTION, SOLUTION EPIDURAL; INFILTRATION; PERINEURAL
Status: COMPLETED | OUTPATIENT
Start: 2025-04-15 | End: 2025-04-15

## 2025-04-15 RX ORDER — ALBUTEROL SULFATE 0.83 MG/ML
2.5 SOLUTION RESPIRATORY (INHALATION) EVERY 4 HOURS PRN
Status: DISCONTINUED | OUTPATIENT
Start: 2025-04-15 | End: 2025-04-16 | Stop reason: HOSPADM

## 2025-04-15 RX ORDER — GLUCAGON 1 MG/ML
1 KIT INJECTION PRN
Status: DISCONTINUED | OUTPATIENT
Start: 2025-04-15 | End: 2025-04-16 | Stop reason: HOSPADM

## 2025-04-15 RX ORDER — GLYCOPYRROLATE 0.2 MG/ML
INJECTION INTRAMUSCULAR; INTRAVENOUS
Status: DISCONTINUED | OUTPATIENT
Start: 2025-04-15 | End: 2025-04-15 | Stop reason: SDUPTHER

## 2025-04-15 RX ORDER — ROPIVACAINE HYDROCHLORIDE 5 MG/ML
30 INJECTION, SOLUTION EPIDURAL; INFILTRATION; PERINEURAL ONCE
Status: DISCONTINUED | OUTPATIENT
Start: 2025-04-15 | End: 2025-04-15 | Stop reason: HOSPADM

## 2025-04-15 RX ORDER — PAROXETINE 10 MG/1
10 TABLET, FILM COATED ORAL EVERY MORNING
Status: DISCONTINUED | OUTPATIENT
Start: 2025-04-16 | End: 2025-04-16 | Stop reason: HOSPADM

## 2025-04-15 RX ORDER — SODIUM CHLORIDE 0.9 % (FLUSH) 0.9 %
5-40 SYRINGE (ML) INJECTION PRN
Status: DISCONTINUED | OUTPATIENT
Start: 2025-04-15 | End: 2025-04-16 | Stop reason: HOSPADM

## 2025-04-15 RX ORDER — UBIDECARENONE 75 MG
100 CAPSULE ORAL DAILY
Status: DISCONTINUED | OUTPATIENT
Start: 2025-04-15 | End: 2025-04-16 | Stop reason: HOSPADM

## 2025-04-15 RX ORDER — FENTANYL CITRATE 50 UG/ML
INJECTION, SOLUTION INTRAMUSCULAR; INTRAVENOUS
Status: COMPLETED
Start: 2025-04-15 | End: 2025-04-15

## 2025-04-15 RX ORDER — MIDAZOLAM HYDROCHLORIDE 1 MG/ML
INJECTION, SOLUTION INTRAMUSCULAR; INTRAVENOUS
Status: DISCONTINUED | OUTPATIENT
Start: 2025-04-15 | End: 2025-04-15 | Stop reason: SDUPTHER

## 2025-04-15 RX ORDER — NALOXONE HYDROCHLORIDE 0.4 MG/ML
INJECTION, SOLUTION INTRAMUSCULAR; INTRAVENOUS; SUBCUTANEOUS PRN
Status: DISCONTINUED | OUTPATIENT
Start: 2025-04-15 | End: 2025-04-15 | Stop reason: HOSPADM

## 2025-04-15 RX ORDER — PANTOPRAZOLE SODIUM 40 MG/1
40 TABLET, DELAYED RELEASE ORAL
Status: DISCONTINUED | OUTPATIENT
Start: 2025-04-16 | End: 2025-04-16 | Stop reason: HOSPADM

## 2025-04-15 RX ORDER — FENTANYL CITRATE 50 UG/ML
INJECTION, SOLUTION INTRAMUSCULAR; INTRAVENOUS
Status: DISCONTINUED | OUTPATIENT
Start: 2025-04-15 | End: 2025-04-15 | Stop reason: SDUPTHER

## 2025-04-15 RX ORDER — DEXAMETHASONE SODIUM PHOSPHATE 10 MG/ML
8 INJECTION, SOLUTION INTRAMUSCULAR; INTRAVENOUS ONCE
Status: COMPLETED | OUTPATIENT
Start: 2025-04-15 | End: 2025-04-15

## 2025-04-15 RX ADMIN — SODIUM CHLORIDE: 9 INJECTION, SOLUTION INTRAVENOUS at 08:11

## 2025-04-15 RX ADMIN — FENTANYL CITRATE 100 MCG: 50 INJECTION, SOLUTION INTRAMUSCULAR; INTRAVENOUS at 07:05

## 2025-04-15 RX ADMIN — TRANEXAMIC ACID 1000 MG: 10 INJECTION, SOLUTION INTRAVENOUS at 10:20

## 2025-04-15 RX ADMIN — ONDANSETRON 4 MG: 2 INJECTION, SOLUTION INTRAMUSCULAR; INTRAVENOUS at 07:20

## 2025-04-15 RX ADMIN — MIDAZOLAM 2 MG: 1 INJECTION INTRAMUSCULAR; INTRAVENOUS at 06:43

## 2025-04-15 RX ADMIN — LIDOCAINE HYDROCHLORIDE 5 ML: 20 INJECTION, SOLUTION EPIDURAL; INFILTRATION; INTRACAUDAL; PERINEURAL at 07:05

## 2025-04-15 RX ADMIN — HYDROMORPHONE HYDROCHLORIDE 0.5 MG: 1 INJECTION, SOLUTION INTRAMUSCULAR; INTRAVENOUS; SUBCUTANEOUS at 09:42

## 2025-04-15 RX ADMIN — EPHEDRINE SULFATE 10 MG: 5 INJECTION INTRAVENOUS at 07:22

## 2025-04-15 RX ADMIN — Medication 100 MCG: at 07:55

## 2025-04-15 RX ADMIN — OXYCODONE 10 MG: 5 TABLET ORAL at 23:39

## 2025-04-15 RX ADMIN — WATER 3000 MG: 1 INJECTION INTRAMUSCULAR; INTRAVENOUS; SUBCUTANEOUS at 23:50

## 2025-04-15 RX ADMIN — IPRATROPIUM BROMIDE AND ALBUTEROL SULFATE 1 DOSE: .5; 2.5 SOLUTION RESPIRATORY (INHALATION) at 10:29

## 2025-04-15 RX ADMIN — CELECOXIB 200 MG: 100 CAPSULE ORAL at 06:05

## 2025-04-15 RX ADMIN — WATER 3000 MG: 1 INJECTION INTRAMUSCULAR; INTRAVENOUS; SUBCUTANEOUS at 15:20

## 2025-04-15 RX ADMIN — ACETAMINOPHEN 1000 MG: 500 TABLET ORAL at 06:05

## 2025-04-15 RX ADMIN — METHOCARBAMOL 1000 MG: 100 INJECTION INTRAMUSCULAR; INTRAVENOUS at 09:24

## 2025-04-15 RX ADMIN — OXYCODONE 10 MG: 5 TABLET ORAL at 18:37

## 2025-04-15 RX ADMIN — EPHEDRINE SULFATE 5 MG: 5 INJECTION INTRAVENOUS at 08:27

## 2025-04-15 RX ADMIN — ROPIVACAINE HYDROCHLORIDE 30 ML: 5 INJECTION, SOLUTION EPIDURAL; INFILTRATION; PERINEURAL at 06:48

## 2025-04-15 RX ADMIN — KETOROLAC TROMETHAMINE 15 MG: 30 INJECTION, SOLUTION INTRAMUSCULAR at 08:40

## 2025-04-15 RX ADMIN — MIDAZOLAM 2 MG: 1 INJECTION INTRAMUSCULAR; INTRAVENOUS at 07:00

## 2025-04-15 RX ADMIN — PROCHLORPERAZINE EDISYLATE 5 MG: 5 INJECTION INTRAMUSCULAR; INTRAVENOUS at 09:51

## 2025-04-15 RX ADMIN — KETOROLAC TROMETHAMINE 15 MG: 15 INJECTION, SOLUTION INTRAMUSCULAR; INTRAVENOUS at 15:08

## 2025-04-15 RX ADMIN — MIDAZOLAM HYDROCHLORIDE 2 MG: 2 INJECTION, SOLUTION INTRAMUSCULAR; INTRAVENOUS at 06:43

## 2025-04-15 RX ADMIN — ROCURONIUM BROMIDE 50 MG: 50 INJECTION INTRAVENOUS at 07:05

## 2025-04-15 RX ADMIN — FENTANYL CITRATE 100 MCG: 50 INJECTION INTRAMUSCULAR; INTRAVENOUS at 06:43

## 2025-04-15 RX ADMIN — ACETAMINOPHEN 650 MG: 325 TABLET ORAL at 15:08

## 2025-04-15 RX ADMIN — TRANEXAMIC ACID 1000 MG: 10 INJECTION, SOLUTION INTRAVENOUS at 07:17

## 2025-04-15 RX ADMIN — SODIUM CHLORIDE: 9 INJECTION, SOLUTION INTRAVENOUS at 06:21

## 2025-04-15 RX ADMIN — Medication 100 MCG: at 15:20

## 2025-04-15 RX ADMIN — DEXAMETHASONE SODIUM PHOSPHATE 8 MG: 10 INJECTION, SOLUTION INTRAMUSCULAR; INTRAVENOUS at 07:20

## 2025-04-15 RX ADMIN — WATER 3000 MG: 1 INJECTION INTRAMUSCULAR; INTRAVENOUS; SUBCUTANEOUS at 07:20

## 2025-04-15 RX ADMIN — HYDROMORPHONE HYDROCHLORIDE 0.5 MG: 1 INJECTION, SOLUTION INTRAMUSCULAR; INTRAVENOUS; SUBCUTANEOUS at 09:28

## 2025-04-15 RX ADMIN — ACETAMINOPHEN 650 MG: 325 TABLET ORAL at 21:03

## 2025-04-15 RX ADMIN — GLYCOPYRROLATE 0.2 MG: 0.2 INJECTION INTRAMUSCULAR; INTRAVENOUS at 07:55

## 2025-04-15 RX ADMIN — Medication 100 MCG: at 08:27

## 2025-04-15 RX ADMIN — Medication 5000 UNITS: at 15:08

## 2025-04-15 RX ADMIN — HYDROMORPHONE HYDROCHLORIDE 0.5 MG: 1 INJECTION, SOLUTION INTRAMUSCULAR; INTRAVENOUS; SUBCUTANEOUS at 10:13

## 2025-04-15 RX ADMIN — SUGAMMADEX 200 MG: 100 INJECTION, SOLUTION INTRAVENOUS at 09:03

## 2025-04-15 RX ADMIN — SODIUM CHLORIDE, PRESERVATIVE FREE 10 ML: 5 INJECTION INTRAVENOUS at 21:07

## 2025-04-15 RX ADMIN — FENTANYL CITRATE 100 MCG: 50 INJECTION, SOLUTION INTRAMUSCULAR; INTRAVENOUS at 06:43

## 2025-04-15 RX ADMIN — KETOROLAC TROMETHAMINE 15 MG: 15 INJECTION, SOLUTION INTRAMUSCULAR; INTRAVENOUS at 21:03

## 2025-04-15 RX ADMIN — EPHEDRINE SULFATE 10 MG: 5 INJECTION INTRAVENOUS at 07:51

## 2025-04-15 RX ADMIN — PROPOFOL 150 MG: 10 INJECTION, EMULSION INTRAVENOUS at 07:05

## 2025-04-15 ASSESSMENT — PAIN - FUNCTIONAL ASSESSMENT
PAIN_FUNCTIONAL_ASSESSMENT: PREVENTS OR INTERFERES SOME ACTIVE ACTIVITIES AND ADLS
PAIN_FUNCTIONAL_ASSESSMENT: 0-10

## 2025-04-15 ASSESSMENT — PAIN DESCRIPTION - ORIENTATION
ORIENTATION: LEFT
ORIENTATION: LEFT;UPPER
ORIENTATION: LEFT
ORIENTATION: LEFT

## 2025-04-15 ASSESSMENT — PAIN SCALES - GENERAL
PAINLEVEL_OUTOF10: 8
PAINLEVEL_OUTOF10: 8
PAINLEVEL_OUTOF10: 4
PAINLEVEL_OUTOF10: 4
PAINLEVEL_OUTOF10: 8
PAINLEVEL_OUTOF10: 7
PAINLEVEL_OUTOF10: 8
PAINLEVEL_OUTOF10: 6
PAINLEVEL_OUTOF10: 7
PAINLEVEL_OUTOF10: 8
PAINLEVEL_OUTOF10: 8
PAINLEVEL_OUTOF10: 7
PAINLEVEL_OUTOF10: 8
PAINLEVEL_OUTOF10: 4

## 2025-04-15 ASSESSMENT — PAIN DESCRIPTION - FREQUENCY
FREQUENCY: CONTINUOUS
FREQUENCY: INTERMITTENT
FREQUENCY: INTERMITTENT
FREQUENCY: CONTINUOUS

## 2025-04-15 ASSESSMENT — PAIN DESCRIPTION - DESCRIPTORS
DESCRIPTORS: ACHING;DISCOMFORT;SORE
DESCRIPTORS: ACHING;DISCOMFORT;SORE
DESCRIPTORS: THROBBING
DESCRIPTORS: ACHING;DISCOMFORT;SORE
DESCRIPTORS: DISCOMFORT;SORE;ACHING
DESCRIPTORS: ACHING;DISCOMFORT;SORE
DESCRIPTORS: DISCOMFORT
DESCRIPTORS: DISCOMFORT
DESCRIPTORS: ACHING
DESCRIPTORS: ACHING;DISCOMFORT;SORE
DESCRIPTORS: DISCOMFORT
DESCRIPTORS: ACHING;DISCOMFORT;SORE

## 2025-04-15 ASSESSMENT — PAIN DESCRIPTION - PAIN TYPE
TYPE: SURGICAL PAIN

## 2025-04-15 ASSESSMENT — LIFESTYLE VARIABLES
HOW MANY STANDARD DRINKS CONTAINING ALCOHOL DO YOU HAVE ON A TYPICAL DAY: PATIENT DOES NOT DRINK
HOW OFTEN DO YOU HAVE A DRINK CONTAINING ALCOHOL: NEVER

## 2025-04-15 ASSESSMENT — PAIN DESCRIPTION - LOCATION
LOCATION: KNEE
LOCATION: KNEE
LOCATION: KNEE;LEG
LOCATION: KNEE
LOCATION: LEG;KNEE
LOCATION: KNEE

## 2025-04-15 ASSESSMENT — PAIN DESCRIPTION - ONSET
ONSET: GRADUAL
ONSET: ON-GOING
ONSET: ON-GOING
ONSET: GRADUAL
ONSET: ON-GOING
ONSET: AWAKENED FROM SLEEP
ONSET: ON-GOING

## 2025-04-15 NOTE — ACP (ADVANCE CARE PLANNING)
Advance Care Planning   Healthcare Decision Maker:    Primary Decision Maker: Silverio Don - [facility] - [phone]    Click here to complete Healthcare Decision Makers including selection of the Healthcare Decision Maker Relationship (ie \"Primary\").

## 2025-04-15 NOTE — ANESTHESIA POSTPROCEDURE EVALUATION
Department of Anesthesiology  Postprocedure Note    Patient: Melisa Don  MRN: [id number]  YOB: 1965  Date of evaluation: 4/15/2025    Procedure Summary       Date: 04/15/25 Room / Location: [address]    Anesthesia Start: 0655 Anesthesia Stop: 0915    Procedure: LEFT KNEE ODIN ROBOTIC ASSISTED TOTAL ARTHROPLASTY (Left: Knee) Diagnosis:       Osteoarthritis of left knee      (Osteoarthritis of left knee [M17.12])    Surgeons: Terence Mack MD Responsible Provider: Kristy Blankenship DO    Anesthesia Type: General, Regional ASA Status: 3            Anesthesia Type: General, Regional    Juanjo Phase I: Juanjo Score: 10    Juanjo Phase II:      Anesthesia Post Evaluation    Patient location during evaluation: bedside  Patient participation: complete - patient participated  Level of consciousness: awake  Pain score: 4  Airway patency: patent  Nausea & Vomiting: no nausea and no vomiting  Cardiovascular status: blood pressure returned to baseline and hemodynamically stable  Respiratory status: acceptable  Hydration status: stable  Pain management: adequate        No notable events documented.

## 2025-04-15 NOTE — CARE COORDINATION
Social Work:    Melisa underwent a left TKA today under the care of Dr. Mack. She was met in pre-testing on April 8th and was provided all information related to the discharge process. Social service met with Melisa &  Silverio and re-introduced herself and role.  Melisa is a patient of Dr. Bazan. She resides in a 2-story home with 3 entry steps (w/HR), bedroom & bathroom (tub/shower)standard commode) located on the main floor. Melisa has a wheeled walker & 3-1 commode from Silverio's prior ortho surgery. She requested [facility] for home PT. Social work sent a referral through [facility] today for discharge tomorrow.    Electronically signed by SHANELLE Briggs on 4/15/2025 at 2:36 PM

## 2025-04-15 NOTE — PROGRESS NOTES
Last visit was VV 3/7/22 w/NP Jose Guadalupe for back pain Occupational Therapy    OCCUPATIONAL THERAPY INITIAL EVALUATION    JOHANA GARCIA [facility]   [address] [place], [place]         Date:4/15/2025                                                  Patient Name: Melisa Don    MRN: [id number]    : 1965    Room: [address] [place] [address]      Evaluating OT: Monica Gregg OTR/L   [id number]      Referring Provider:Xavier Lassiter APRN -     Specific Provider Orders/Date:OT eval and treat 4/15/2025      Diagnosis:  Osteoarthritis of left knee [M17.12]  Status post left knee replacement [Z96.652]    Surgery: L TKA      Pertinent Medical History: DM, IBS, shoulder surgery      Precautions:  Fall Risk, WBAT L LE      Assessment of current deficits    [x] Functional mobility  [x]ADLs  [x] Strength               []Cognition    [x] Functional transfers   [x] IADLs         [x] Safety Awareness   [x]Endurance    [] Fine Coordination              [x] Balance      [] Vision/perception   []Sensation     []Gross Motor Coordination  [] ROM  [] Delirium                   [] Motor Control     OT PLAN OF CARE   OT POC based on physician orders, patient diagnosis and results of clinical assessment    Frequency/Duration  1-3 days/wk for  PRN   Specific OT Treatment Interventions to include:   ADL retraining/adapted techniques and AE recommendations to increase functional independence within precautions                    Energy conservation techniques to improve tolerance for selfcare routine   Functional transfer/mobility training/DME recommendations for increased independence, safety and fall prevention         Patient/family education to increase safety and functional independence             Environmental modifications for safe mobility and completion of ADLs                             Therapeutic activity to improve functional performance during ADLs.                                         Therapeutic exercise to improve tolerance and functional

## 2025-04-15 NOTE — ANESTHESIA PROCEDURE NOTES
Peripheral Block    Patient location during procedure: pre-op  Reason for block: post-op pain management and at surgeon's request  Start time: 4/15/2025 6:48 AM  End time: 4/15/2025 6:51 AM  Staffing  Performed: anesthesiologist   Anesthesiologist: Kristy Blankenship DO  Performed by: Kristy Blankenship DO  Authorized by: Kristy Blankenship DO    Preanesthetic Checklist  Completed: patient identified, IV checked, site marked, risks and benefits discussed, surgical/procedural consents, equipment checked, pre-op evaluation, timeout performed, anesthesia consent given, oxygen available and monitors applied/VS acknowledged  Peripheral Block   Patient position: sitting  Prep: ChloraPrep  Provider prep: mask and sterile gloves  Patient monitoring: cardiac monitor, continuous pulse ox, frequent blood pressure checks and IV access  Block type: Femoral  Adductor canal  Laterality: left  Injection technique: single-shot  Guidance: ultrasound guided  Local infiltration: lidocaine  Infiltration strength: 1 %  Local infiltration: lidocaine  Dose: 3 mL    Needle   Needle gauge: 21 G  Needle localization: ultrasound guidance  Needle length: 10 cm  Assessment   Injection assessment: negative aspiration for heme, no paresthesia on injection and local visualized surrounding nerve on ultrasound  Paresthesia pain: none  Slow fractionated injection: yes  Hemodynamics: stable    Additional Notes  U/S 63090.  (1) Under ultrasound guidance, a  gauge needle was inserted and placed in close proximity to the nerve.  (2) Ultrasound was also used to visualize the spread of the anesthetic in close proximity to the nerve being blocked. (3) The nerve appeared anatomically normal, and (4 there were no apparent abnormal pathological findings on the image that were readily visible and related to the nerve being blocked. (5) A permanent ultrasound image was saved in the patient's record.            Medications Administered  ropivacaine (NAROPIN)

## 2025-04-15 NOTE — PROGRESS NOTES
.    Per [facility] approved formulary policy.     SGLT2's are only formulary with the indication of CKD or CHF therefore:    Please note that the  Empagliflozin (Jardiance) is non-formulary with indications of type 2 diabetes and has been discontinued while inpatient.    Please contact the pharmacy with any questions or concerns.  Thank you.  Rangel Long RPH, RPjeri/PharmD 4/15/2025 11:49 AM

## 2025-04-15 NOTE — ANESTHESIA PRE PROCEDURE
Department of Anesthesiology  Preprocedure Note       Name:  Melisa Don   Age:  60 y.o.  :  1965                                          MRN:  [id number]         Date:  4/15/2025      Surgeon: Surgeon(s):  Terence Mack MD    Procedure: Procedure(s):  LEFT KNEE ODIN ROBOTIC ASSISTED TOTAL ARTHROPLASTY   ++LAVINIA++   ++PNB++    Medications prior to admission:   Prior to Admission medications    Medication Sig Start Date End Date Taking? Authorizing Provider   omeprazole (PRILOSEC) 20 MG delayed release capsule Take 1 capsule by mouth daily   Yes Javed Bonilla MD   celecoxib (CELEBREX) 200 MG capsule Take 1 capsule by mouth 2 times daily 25  Yes Xavier Lassiter APRN - CNP   celecoxib (CELEBREX) 200 MG capsule Take 1 capsule by mouth 2 times daily 10/29/24  Yes Xavier Lassiter APRN - CNP   vitamin B-12 (CYANOCOBALAMIN) 100 MCG tablet Take by mouth daily   Yes Javed Bonilla MD   albuterol sulfate HFA (PROVENTIL;VENTOLIN;PROAIR) 108 (90 Base) MCG/ACT inhaler albuterol sulfate HFA 90 mcg/actuation aerosol inhaler   INHALE TWO PUFFS BY MOUTH EVERY 4 HOURS   Yes Javed Bonilla MD   albuterol (PROVENTIL) (2.5 MG/3ML) 0.083% nebulizer solution albuterol sulfate 2.5 mg/3 mL (0.083 %) solution for nebulization   INHALE THE CONTENTS OF ONE VIAL (3ML) VIA NEBULIZER 4 TIMES A DAY.   Yes Javed Bonilla MD   vitamin D3 (CHOLECALCIFEROL) 125 MCG (5000 UT) TABS tablet cholecalciferol (vitamin D3) 125 mcg (5,000 unit) tablet   TAKE ONE TABLET BY MOUTH EVERY DAY   Yes Javed Bonilla MD   PARoxetine (PAXIL) 10 MG tablet Take 1 tablet by mouth every morning 16  Yes Javed Bonilla MD   diclofenac (VOLTAREN) 75 MG EC tablet Take 1 tablet by mouth 2 times daily  Patient not taking: Reported on 2025 10/29/24 11/28/24  Xavier Lassiter APRN - CNP   ketorolac (TORADOL) 10 MG tablet Take 1 tablet by mouth every 6 hours as needed for Pain  Patient not taking:

## 2025-04-15 NOTE — PROGRESS NOTES
Stand pivot:  NT    S/I    Ambulation     15 and 50  feet with  ww  with  SBA/CGA    150  feet with  ww  with  S/I        Stair negotiation: ascended and descended NT    4-12  steps with  1  rail with  SBA   LE ROM  AAROM R knee 0-85 degrees     LE strength  3-/ 5 R knee      AM- PAC RAW score   17/ 24            Pt is alert and Oriented      Balance:  CGA with gait using ww .    Endurance: decreased   Bed/Chair alarm:  no      ASSESSMENT  Pt displays functional ability as noted in the objective portion of this evaluation.        Conditions Requiring Skilled Therapeutic Intervention:    [x]Decreased strength     [x]Decreased ROM  [x]Decreased functional mobility  [x]Decreased balance   [x]Decreased endurance   [x]Decreased posture  []Decreased sensation  []Decreased coordination   []Decreased vision  []Decreased safety awareness   [x]Increased pain         Comments:   Pt  in bed  upon arrival ; agreeable to PT.  Pt's spouse present for session. Instructed pt in APs, QS, and GS . Mobility as above.      Treatment:  Pt was instructed on the following :   -Bed mobility : including sequencing and technique  -Transfers: hand and foot placement, controlled movement with stand to sit  - Gait: proper use of ww, sequencing, and posture . Pt needs repeated cues for sequencing and ww approximation           Pt educated on fall risk, safety with mobility  , seated LAQS, hip and knee flex        Patient response to education:   Pt verbalized understanding Pt demonstrated skill Pt requires further education in this area   x  With cues   x       Comments:  Pt left  in chair after session, with call light in reach.      Rehab potential is Good for reaching above PT goals.    Pt’s/ family goals   1.  Home, recover     Patient and or family understand(s) diagnosis, prognosis, and plan of care. -  yes     PLAN  PT care will be provided in accordance with the objectives noted above.  Whenever appropriate, clear delegation orders will

## 2025-04-15 NOTE — OP NOTE
OPERATIVE REPORT    PATIENT:  Melisa Don  [id number]    DATE OF PROCEDURE:  4/15/25    SURGEON: Terence Mack MD    ASSISTANT:  Xavier Lassiter CNP.  No qualified resident available to assist.  CNP was necessary for positioning, prepping/draping, intra-operative assistance, and wound closure.  His assistance expedited the procedure and decreased operating room time.      PREOPERATIVE DIAGNOSIS: Degenerative joint disease , Left knee.     POSTOPERATIVE DIAGNOSIS: Degenerative joint disease,  Left knee.     OPERATION: Tarun Robot Assisted Left total knee arthroplasty     ANESTHESIA: . general and adductor canal block     OPERATIVE INDICATIONS:  The patient is a 60 year old female with end stage degenerative joint disease involving the knee, for which more conservative non operative management has failed.  The patient is thus indicated for total knee arthroplasty.  We discussed use of the Tarun robot for assistance.  The patient was agreeable.  The risks and benefits, as well as alternatives were discussed at length with the patient in detail.  These risks include, but are not limited to infection, nerve and/or blood vessel injury, intra or post operative fracture, need for revision surgery, failure of implants, deep vein thrombosis and pulmonary embolism, athrofibrosis, and the risks of general anesthesia provided by the anesthesiologist.   The patient understood these risks, signed an informed consent, and elected to proceed    OPERATIVE FINDINGS:   There was extensive eburnation of bone, and full thickness cartilage loss over the femoral and tibial condyles.      OPERATIVE PROCEDURE: After satisfactory spinal anesthesia, the patient was placed supine on the operative table.  A Bump was placed under the operative leg and a tourniquet was placed high on the operative thigh.  The operative extremity was prepped and draped in sterile fashion.  Prior to procedure start, a time out was performed including

## 2025-04-15 NOTE — PROGRESS NOTES
4 Eyes Skin Assessment     NAME:  Melisa Don  YOB: 1965  MEDICAL RECORD NUMBER:  [id number]    The patient is being assessed for  Admission    I agree that at least one RN has performed a thorough Head to Toe Skin Assessment on the patient. ALL assessment sites listed below have been assessed.      Areas assessed by both nurses:    Head, Face, Ears, Shoulders, Back, Chest, Arms, Elbows, Hands, Sacrum. Buttock, Coccyx, Ischium, and Legs. Feet and Heels        Does the Patient have a Wound? No noted wound(s)       Tor Prevention initiated by RN: Yes  Wound Care Orders initiated by RN: No    Pressure Injury (Stage 3,4, Unstageable, DTI, NWPT, and Complex wounds) if present, place Wound referral order by RN under : No    New Ostomies, if present place, Ostomy referral order under : No     Nurse 1 eSignature: Electronically signed by Madison Mcrae RN on 4/15/25 at 12:40 PM EDT    **SHARE this note so that the co-signing nurse can place an eSignature**    Nurse 2 eSignature: {Esignature:[id number]}

## 2025-04-15 NOTE — H&P
Department of Orthopedic Surgery  Attending Pre-operative History and Physical        DIAGNOSIS: Left knee end-stage osteoarthritis    INDICATION: Failed conservative treatment    PROCEDURE: Left knee Tarun robot assisted total knee arthroplasty    CHIEF COMPLAINT: Left knee pain    History Obtained From:  patient    HISTORY OF PRESENT ILLNESS:      The patient is a 60 y.o. female with significant past medical history of end-stage osteoarthritis of the knee that has been refractory to conservative treatment.  For these reasons the patient is interested in surgical management which we discussed would involve Tarun robot assisted total knee arthroplasty.  Risks of surgery were discussed in detail including but not limited to infection, neurovascular injury, DVT/pulmonary embolus, arthrofibrosis, need for revision surgery, loss of limb, death from anesthesia, unforeseen risks.  We also discussed the anticipated rehab protocol and need for physical therapy.  The patient understands and would like to proceed.      Past Medical History:        Diagnosis Date    Diabetes mellitus (HCC)     GERD (gastroesophageal reflux disease)     Hypertension     IBS (irritable bowel syndrome)     Prolonged emergence from general anesthesia     Seasonal allergies     Whooping cough 2020       Past Surgical History:        Procedure Laterality Date    CHOLECYSTECTOMY  11/11/2011    FINGER TRIGGER RELEASE Left 12/28/2022    LEFT THUMB TRIGGER  RELEASE LEFT INDEX AND MIDDLE FINGER TRIGGER RELEASE LEFT CARPAL TUNNEL RELEASE performed by Dami Martinez MD at [facility] OR    FOOT SURGERY Right 03/2019    plantar    HEEL SPUR SURGERY Left 12/2016    JOINT REPLACEMENT Bilateral 09/2017    Great toe joints    JOINT REPLACEMENT Bilateral 03/2017    both big toes    PARTIAL HYSTERECTOMY (CERVIX NOT REMOVED)      SHOULDER ARTHROSCOPY Right 3/1/2023    RIGHT SHOULDER ARTHROSCOPY WITH DECOMPRESSION ROTATOR CUFF REPAIR POSSIBLE BICEPS TENODESIS performed by

## 2025-04-16 VITALS
OXYGEN SATURATION: 100 % | RESPIRATION RATE: 18 BRPM | HEART RATE: 92 BPM | WEIGHT: 265 LBS | HEIGHT: 66 IN | BODY MASS INDEX: 42.59 KG/M2 | TEMPERATURE: 98.1 F | SYSTOLIC BLOOD PRESSURE: 107 MMHG | DIASTOLIC BLOOD PRESSURE: 63 MMHG

## 2025-04-16 LAB
ERYTHROCYTE [DISTWIDTH] IN BLOOD BY AUTOMATED COUNT: 15.2 % (ref 11.5–15)
GLUCOSE BLD-MCNC: 173 MG/DL (ref 74–99)
HCT VFR BLD AUTO: 39.5 % (ref 34–48)
HGB BLD-MCNC: 12.2 G/DL (ref 11.5–15.5)
MCH RBC QN AUTO: 28.9 PG (ref 26–35)
MCHC RBC AUTO-ENTMCNC: 30.9 G/DL (ref 32–34.5)
MCV RBC AUTO: 93.6 FL (ref 80–99.9)
PLATELET # BLD AUTO: 215 K/UL (ref 130–450)
PMV BLD AUTO: 10.5 FL (ref 7–12)
RBC # BLD AUTO: 4.22 M/UL (ref 3.5–5.5)
WBC OTHER # BLD: 12.8 K/UL (ref 4.5–11.5)

## 2025-04-16 PROCEDURE — 85027 COMPLETE CBC AUTOMATED: CPT

## 2025-04-16 PROCEDURE — 97530 THERAPEUTIC ACTIVITIES: CPT

## 2025-04-16 PROCEDURE — 36415 COLL VENOUS BLD VENIPUNCTURE: CPT

## 2025-04-16 PROCEDURE — 6370000000 HC RX 637 (ALT 250 FOR IP): Performed by: NURSE PRACTITIONER

## 2025-04-16 PROCEDURE — G0378 HOSPITAL OBSERVATION PER HR: HCPCS

## 2025-04-16 PROCEDURE — 97535 SELF CARE MNGMENT TRAINING: CPT

## 2025-04-16 PROCEDURE — 6360000002 HC RX W HCPCS: Performed by: NURSE PRACTITIONER

## 2025-04-16 PROCEDURE — 96374 THER/PROPH/DIAG INJ IV PUSH: CPT

## 2025-04-16 PROCEDURE — 82962 GLUCOSE BLOOD TEST: CPT

## 2025-04-16 PROCEDURE — 2500000003 HC RX 250 WO HCPCS: Performed by: NURSE PRACTITIONER

## 2025-04-16 PROCEDURE — 96376 TX/PRO/DX INJ SAME DRUG ADON: CPT

## 2025-04-16 RX ADMIN — Medication 100 MCG: at 08:24

## 2025-04-16 RX ADMIN — ASPIRIN 81 MG: 81 TABLET, COATED ORAL at 08:26

## 2025-04-16 RX ADMIN — KETOROLAC TROMETHAMINE 15 MG: 15 INJECTION, SOLUTION INTRAMUSCULAR; INTRAVENOUS at 09:11

## 2025-04-16 RX ADMIN — PANTOPRAZOLE SODIUM 40 MG: 40 TABLET, DELAYED RELEASE ORAL at 06:33

## 2025-04-16 RX ADMIN — KETOROLAC TROMETHAMINE 15 MG: 15 INJECTION, SOLUTION INTRAMUSCULAR; INTRAVENOUS at 03:48

## 2025-04-16 RX ADMIN — ACETAMINOPHEN 650 MG: 325 TABLET ORAL at 09:10

## 2025-04-16 RX ADMIN — ATENOLOL 25 MG: 50 TABLET ORAL at 08:25

## 2025-04-16 RX ADMIN — PAROXETINE 10 MG: 10 TABLET, FILM COATED ORAL at 08:26

## 2025-04-16 RX ADMIN — OXYCODONE 10 MG: 5 TABLET ORAL at 06:34

## 2025-04-16 RX ADMIN — Medication 5000 UNITS: at 08:30

## 2025-04-16 RX ADMIN — SODIUM CHLORIDE, PRESERVATIVE FREE 10 ML: 5 INJECTION INTRAVENOUS at 08:31

## 2025-04-16 RX ADMIN — ACETAMINOPHEN 650 MG: 325 TABLET ORAL at 03:48

## 2025-04-16 RX ADMIN — OXYCODONE 10 MG: 5 TABLET ORAL at 10:55

## 2025-04-16 ASSESSMENT — PAIN SCALES - GENERAL
PAINLEVEL_OUTOF10: 5
PAINLEVEL_OUTOF10: 6
PAINLEVEL_OUTOF10: 7
PAINLEVEL_OUTOF10: 5
PAINLEVEL_OUTOF10: 7
PAINLEVEL_OUTOF10: 6

## 2025-04-16 ASSESSMENT — PAIN DESCRIPTION - LOCATION
LOCATION: KNEE

## 2025-04-16 ASSESSMENT — PAIN DESCRIPTION - ORIENTATION
ORIENTATION: LEFT

## 2025-04-16 ASSESSMENT — PAIN DESCRIPTION - DESCRIPTORS
DESCRIPTORS: ACHING;SORE
DESCRIPTORS: ACHING;SORE
DESCRIPTORS: PRESSURE
DESCRIPTORS: PRESSURE;THROBBING

## 2025-04-16 NOTE — PROGRESS NOTES
Department of Orthopedic Surgery  Resident Progress Note    Patient seen and examined at bedside this morning.  Patient sitting up and resting comfortably.  Patient has concerns regarding her blood sugar this morning.  We did discuss reaching out to medicine for management of her diabetes.  Otherwise patient overall doing well.  Patient likely discharge home today following physical therapy.  Patient denies any chest pain or shortness of breath.    VITALS:  BP (!) 144/80   Pulse 76   Temp 97.9 °F (36.6 °C) (Oral)   Resp 18   Ht 1.676 m (5' 6\")   Wt 120.2 kg (265 lb)   SpO2 96%   BMI 42.77 kg/m²     General: alert and oriented to person, place and time, well-developed and well-nourished, in no acute distress    MUSCULOSKELETAL:   left lower extremity:  Dressings are clean dry and intact  Compartments are soft and compressible  Patient able to actively plantarflex and dorsiflex left ankle  +2/4 DP & PT pulses, Brisk Cap refill, Toes warm and perfused  Distal sensation grossly intact to Peroneals, Sural, Saphenous, and tibial nrs    CBC:   Lab Results   Component Value Date/Time    WBC 12.8 04/16/2025 05:36 AM    HGB 12.2 04/16/2025 05:36 AM    HCT 39.5 04/16/2025 05:36 AM     04/16/2025 05:36 AM     PT/INR:    Lab Results   Component Value Date/Time    PROTIME 11.8 05/15/2015 06:17 AM    PROTIME 11.1 05/18/2012 08:44 PM    INR 1.1 05/15/2015 06:17 AM       ASSESSMENT  S/P left total knee arthroplasty on 4/15    PLAN      Continue physical therapy and protocol: WBAT -left LE  24 hour abx coverage  Deep venous thrombosis prophylaxis -aspirin 81 mg twice daily postop day 1, early mobilization  PT/OT  Pain Control: IV and PO  Monitor H&H: 12.2 this morning  D/C Plan: Appreciate physical therapy and social work recommendations.  Patient likely discharge home today following physical therapy.      Electronically signed by Yordan Griffin DO on 4/16/2025 at 7:23 AM  Note: This report was completed using

## 2025-04-16 NOTE — PLAN OF CARE
Problem: Discharge Planning  Goal: Discharge to home or other facility with appropriate resources  4/16/2025 0927 by Isi Fisher RN  Outcome: Adequate for Discharge  Flowsheets (Taken 4/16/2025 0820)  Discharge to home or other facility with appropriate resources: Identify barriers to discharge with patient and caregiver     Problem: Pain  Goal: Verbalizes/displays adequate comfort level or baseline comfort level  4/16/2025 0927 by Isi Fisher RN  Outcome: Adequate for Discharge     Problem: Safety - Adult  Goal: Free from fall injury  4/16/2025 0927 by Isi Fisher RN  Outcome: Adequate for Discharge     Problem: Chronic Conditions and Co-morbidities  Goal: Patient's chronic conditions and co-morbidity symptoms are monitored and maintained or improved  4/16/2025 0927 by Isi Fisher RN  Outcome: Adequate for Discharge  Flowsheets (Taken 4/16/2025 0820)  Care Plan - Patient's Chronic Conditions and Co-Morbidity Symptoms are Monitored and Maintained or Improved: Monitor and assess patient's chronic conditions and comorbid symptoms for stability, deterioration, or improvement     Problem: ABCDS Injury Assessment  Goal: Absence of physical injury  4/16/2025 0927 by Isi Fisher, RN  Outcome: Adequate for Discharge

## 2025-04-16 NOTE — PROGRESS NOTES
CLINICAL PHARMACY NOTE: MEDS TO BEDS    Total # of Prescriptions Filled: 2   The following medications were delivered to the patient:  Percocet 5/325  Aspirin 81    Additional Documentation:

## 2025-04-16 NOTE — PROGRESS NOTES
Physical Therapy  Facility/Department: [address] ORTHO SURGERY  Daily Treatment Note  NAME: Melisa Don  : 1965  MRN: [id number]    Date of Service: 2025          Patient Diagnosis(es): The primary encounter diagnosis was Pre-op testing. Diagnoses of Status post left knee replacement and Osteoarthritis of left knee were also pertinent to this visit.  Past Medical History:  has a past medical history of Diabetes mellitus (HCC), GERD (gastroesophageal reflux disease), Hypertension, IBS (irritable bowel syndrome), Prolonged emergence from general anesthesia, Seasonal allergies, and Whooping cough.  Past Surgical History:  has a past surgical history that includes Cholecystectomy (2011); Partial hysterectomy; Heel spur surgery (Left, 2016); joint replacement (Bilateral, 2017); joint replacement (Bilateral, 2017); Foot surgery (Right, 2019); Finger trigger release (Left, 2022); Shoulder arthroscopy (Right, 3/1/2023); Shoulder arthroscopy (Left, 2024); and Total knee arthroplasty (Left, 4/15/2025).        Requires PT Follow-Up: Yes     Evaluating Therapist: Leela Colorado PT      Referring Provider:  Xavier Lassiter APRN - CNP     PT order : PT eval and treat      Room #: 719  DIAGNOSIS: The primary encounter diagnosis was Pre-op testing. Diagnoses of Status post left knee replacement and Osteoarthritis of left knee were also pertinent to this visit.     PRECAUTIONS: falls, FWBAT      Social:  Pt lives with  spouse  in a  2  floor plan  with bathroom on second ,  steps and  1  rails to enter.  Prior to admission pt walked with  no AD. Has ww        Initial Evaluation  Date:  4/15/2025  Treatment      Short Term/ Long Term   Goals   Was pt agreeable to Eval/treatment?  Yes   yes      Does pt have pain?  R knee   R knee      Bed Mobility  Rolling:  NT   Supine to sit:  min assist   Sit to supine:  NT   Scooting:  SBA to EOB   supine to sit : SBA   S/I   Transfers Sit to stand:   CGA   Stand to sit:  CGA   Stand pivot:  NT   sit <>stand : S/I   S/I    Ambulation     15 and 50  feet with  ww  with  SBA/CGA   140 feet x 2 with ww S/I  150  feet with  ww  with  S/I          Stair negotiation: ascended and descended NT   4 steps with B HRs SBA   4 steps  x 3 with 1 HR and cane SBA/CGA  4-12  steps with  1  rail with  SBA   LE ROM  AAROM R knee 0-85 degrees       LE strength  3-/ 5 R knee        AM- PAC RAW score   17/ 24 20/ 24               Pt is alert and Oriented       Balance:  independent  with gait using ww , no LOB      Endurance: WFL  Bed/Chair alarm:  no      ASSESSMENT    Pt displays functional ability as noted in the objective portion of this treatment         Comments:   Pt  in bed  upon arrival ; agreeable to PT.  Pt's spouse present for session. Re- instructed pt in APs, QS, and GS . Mobility as above.       Treatment:  Pt was instructed on the following :   -Bed mobility : including sequencing and technique  -Transfers: hand and foot placement, controlled movement with stand to sit. Instructed in proper car transfer technique   - Gait: proper use of ww, sequencing, and posture . Pt able to display reciprocal pattern today               Pt educated on fall risk, safety with mobility  , seated LAQS, hip and knee flex         Patient response to education:   Pt verbalized understanding Pt demonstrated skill Pt requires further education in this area   x  X. Needs occ cues    x         Comments:  Pt left  in chair after session, with call light in reach.             PLAN    PT care will be provided in accordance with the objectives noted above.  Whenever appropriate, clear delegation orders will be provided for nursing staff.  Exercises and functional mobility practice will be used as well as appropriate assistive devices or modalities to obtain goals. Patient and family education will also be administered as needed.           PLAN OF CARE:     Current Treatment Recommendations

## 2025-04-16 NOTE — PROGRESS NOTES
Occupational Therapy  OT BEDSIDE TREATMENT NOTE      Date:2025  Patient Name: Melisa Don  MRN: [id number]  : 1965  Room: [address] [place], [place] [address]         Evaluating OT: Monica Gregg OTR/L   [id number]       Referring Provider:Xavier Lassiter APRN -     Specific Provider Orders/Date:OT eval and treat 4/15/2025       Diagnosis:  Osteoarthritis of left knee [M17.12]  Status post left knee replacement [Z96.652]    Surgery: L TKA      Pertinent Medical History: DM, IBS, shoulder surgery      Precautions:  Fall Risk, WBAT L LE       Assessment of current deficits    [x] Functional mobility            [x]ADLs           [x] Strength                  []Cognition    [x] Functional transfers          [x] IADLs         [x] Safety Awareness   [x]Endurance    [] Fine Coordination                         [x] Balance      [] Vision/perception   []Sensation      []Gross Motor Coordination             [] ROM           [] Delirium                   [] Motor Control      OT PLAN OF CARE   OT POC based on physician orders, patient diagnosis and results of clinical assessment     Frequency/Duration  1-3 days/wk for  PRN   Specific OT Treatment Interventions to include:   ADL retraining/adapted techniques and AE recommendations to increase functional independence within precautions                    Energy conservation techniques to improve tolerance for selfcare routine   Functional transfer/mobility training/DME recommendations for increased independence, safety and fall prevention         Patient/family education to increase safety and functional independence             Environmental modifications for safe mobility and completion of ADLs                             Therapeutic activity to improve functional performance during ADLs.                                         Therapeutic exercise to improve tolerance and functional strength for ADLs    Balance retraining/tolerance tasks for facilitation of postural control with dynamic  challenges during ADLs .      Positioning to improve functional independence      Recommended Adaptive Equipment: TBD      Home Living: Pt lives with , 2 story with steps to enter.  Bed/bath on 2nd   bed on 2nd    Bathroom setup: tub/shower    Equipment owned: walker, compression sleeve      Prior Level of Function: Independent with ADLs , and  with IADLs; ambulated with no device         Pain Level: Pt did not rate knee pain.   Cognition: Awake and alert.                 Functional Assessment:  AM-PAC Daily Activity Raw Score: 18/24    Initial Eval Status  Date: 4/15/2025 Treatment Status  Date: 4/16/25  STGs = LTGs  Time frame: 10-14 days   Feeding Independent       Grooming SBA  Standing at sink washing hands  Supervision while standing at the sink.   Independent   UB Dressing Set-up   setup  Independent   LB Dressing Able to bend to reach feet   Has EZ glide compression sleeve at home from husbands past surgery - both report they don't need another   instructed with compensatory strategies for LB dressing.  Pt required use of long shoehorn and increased time to don shoes.   CGA for balance when standing for dynamic ADL.    Mod I    Bathing Min A  Instructed with safety during tub transfer.  Pt plans to sponge bathe and have home health assess her transfer skills in home environment.  Pt would do better with carry over of safety with training in the home.     Mod I   Toileting SBA  SBA toilet transfer.   Independent tiffanie hygiene.     Independent   Bed Mobility  SBA   Supine to sit  Independent supine to sit   Independent   Functional Transfers SBA   Sit-stand from bed  SBA/supervision.  Cues for technique for safety and pain control.   Mod I    Functional Mobility SBA.w/walker   Ambulating to/from bathroom   supervision using w/w in room setting.    Mod I  with good tolerance    Balance Sitting:     Static:  Independent    Dynamic:Independent  Standing: SBA  CGA dynamic standing during LB ADL.

## 2025-04-16 NOTE — CARE COORDINATION
Case Management... Discharge order placed. [facility] notified. [facility] orders placed.   Electronically signed by Thu Ellis RN on 4/16/2025 at 8:22 AM

## 2025-04-23 ENCOUNTER — OFFICE VISIT (OUTPATIENT)
Dept: ORTHOPEDIC SURGERY | Age: 60
End: 2025-04-23

## 2025-04-23 VITALS
RESPIRATION RATE: 20 BRPM | HEIGHT: 66 IN | SYSTOLIC BLOOD PRESSURE: 118 MMHG | BODY MASS INDEX: 42.59 KG/M2 | WEIGHT: 265 LBS | DIASTOLIC BLOOD PRESSURE: 81 MMHG | OXYGEN SATURATION: 98 % | HEART RATE: 88 BPM | TEMPERATURE: 97.8 F

## 2025-04-23 DIAGNOSIS — M17.12 OSTEOARTHRITIS OF LEFT KNEE, UNSPECIFIED OSTEOARTHRITIS TYPE: ICD-10-CM

## 2025-04-23 DIAGNOSIS — Z96.652 STATUS POST LEFT KNEE REPLACEMENT: Primary | ICD-10-CM

## 2025-04-23 LAB — SURGICAL PATHOLOGY REPORT: NORMAL

## 2025-04-23 PROCEDURE — 99024 POSTOP FOLLOW-UP VISIT: CPT | Performed by: ORTHOPAEDIC SURGERY

## 2025-04-23 RX ORDER — TIRZEPATIDE 7.5 MG/.5ML
INJECTION, SOLUTION SUBCUTANEOUS
COMMUNITY
Start: 2025-04-21

## 2025-04-23 RX ORDER — OXYCODONE AND ACETAMINOPHEN 5; 325 MG/1; MG/1
1 TABLET ORAL EVERY 6 HOURS PRN
Qty: 28 TABLET | Refills: 0 | Status: SHIPPED | OUTPATIENT
Start: 2025-04-23 | End: 2025-04-30

## 2025-04-23 NOTE — PATIENT INSTRUCTIONS
POST-OP INSTRUCTIONS:     -Remove steri strips 5 - 7 days  -While showering let water run over incision sites, no scrubbing, pat dry only  -Avoid submerging in the water for the next 6 weeks  -Avoid putting lotions, ointments, rubbing alcohol, hydrogen peroxide over incision sites  -Avoid direct sunlight onto area to avoid burning of incision sites

## 2025-04-23 NOTE — PROGRESS NOTES
[facility]  ORTHOPAEDICS AND SPORTS MEDICINE  DATE OF VISIT: 04/23/25  Follow Up Post Operative Visit     Follow Up Post Operative Visit     CHIEF COMPLAINT:   Chief Complaint   Patient presents with    Follow Up After Procedure     4/15/2025 - Tarun Robot Assisted Left total knee arthroplasty    Post-Op Check     Left knee POD 8     Suture / Staple Removal     Left knee    Wound Check     Left knee    Pain     Left knee 5 / 10         Surgery: Left Tarun robotic assisted total knee arthroplasty  Date: 4/15/2025    Subjective:    Melisa Don is here for follow up visit s/p above procedure.  She is doing well.  She has been progressing well with home health PT.      Controlled Substances Monitoring:        Physical Exam:    Height: 1.676 m (5' 6\"), Weight - Scale: 120.2 kg (265 lb), BP: 118/81    General: Alert and oriented x3, no acute distress  Cardiovascular/pulmonary: No labored breathing, peripheral perfusion intact  Musculoskeletal:    Left knee Exam displays stable postoperative swelling, neurovascular sensation is grossly intact, incision sites are closed edges are well approximated no drainage present.  No signs or symptoms of infection.  Active range of motion displayed today 5-95.  Extensor mechanism is intact.        Imaging: X-ray including 4 views left knee display stable appearance of total knee replacement without complication    Assessment and Plan: Status post left Tarun robotic assisted total knee arthroplasty      Patient is postop week 1 from procedure listed above doing well.  Postoperative imaging obtained and reviewed with patient today.  Surgical sutures were removed, and new Steri-Strip dressings were applied.  Patient instructed to remove Steri-Strips in 1 week.  Patient is okay to shower for basic hygiene purposes, will not submerge incision sites until mature scars are present.  She will now transition to outpatient physical therapy.  Follow-up in 6 weeks.      Xavier Lassiter,

## 2025-05-02 DIAGNOSIS — M17.12 OSTEOARTHRITIS OF LEFT KNEE, UNSPECIFIED OSTEOARTHRITIS TYPE: ICD-10-CM

## 2025-05-02 DIAGNOSIS — Z96.652 STATUS POST LEFT KNEE REPLACEMENT: ICD-10-CM

## 2025-05-02 RX ORDER — OXYCODONE AND ACETAMINOPHEN 5; 325 MG/1; MG/1
1 TABLET ORAL EVERY 6 HOURS PRN
Qty: 28 TABLET | Refills: 0 | Status: SHIPPED | OUTPATIENT
Start: 2025-05-02 | End: 2025-05-09

## 2025-05-02 NOTE — TELEPHONE ENCOUNTER
Pt phoned in requesting medication refill.    Surgery: Left Tarun robotic assisted total knee arthroplasty  Date: 4/15/2025       Pt two and a half weeks out   Pts last refill 4/23

## 2025-05-15 PROBLEM — Z01.818 PRE-OP TESTING: Status: RESOLVED | Noted: 2025-04-15 | Resolved: 2025-05-15

## 2025-05-27 ENCOUNTER — OFFICE VISIT (OUTPATIENT)
Dept: ORTHOPEDIC SURGERY | Age: 60
End: 2025-05-27

## 2025-05-27 VITALS
DIASTOLIC BLOOD PRESSURE: 96 MMHG | HEIGHT: 66 IN | WEIGHT: 265 LBS | OXYGEN SATURATION: 98 % | BODY MASS INDEX: 42.59 KG/M2 | TEMPERATURE: 98.8 F | SYSTOLIC BLOOD PRESSURE: 122 MMHG | HEART RATE: 80 BPM | RESPIRATION RATE: 20 BRPM

## 2025-05-27 DIAGNOSIS — Z96.652 STATUS POST LEFT KNEE REPLACEMENT: Primary | ICD-10-CM

## 2025-05-27 PROCEDURE — 99024 POSTOP FOLLOW-UP VISIT: CPT | Performed by: NURSE PRACTITIONER

## 2025-05-27 NOTE — PROGRESS NOTES
[facility]   ORTHOPAEDIC SURGERY AND SPORTS MEDICINE  DATE OF VISIT: 05/27/25      Follow Up Post Operative Visit     CHIEF COMPLAINT:   Chief Complaint   Patient presents with    Post-Op Check     Pt is here today 6 weeks out from a Niesha robot assisted left total knee arthroplasty. Overall doing well.        Surgery: Left Niesha robotic assisted total knee arthroplasty  Date: 4/15/2025    Subjective:    Melisa Don is here for follow up visit s/p above procedure.  She is doing well. Reports progressing well with PT and home exercises.    Controlled Substances Monitoring:        Physical Exam:    Height: 1.676 m (5' 6\"), Weight - Scale: 120.2 kg (265 lb), BP: (!) 122/96    General: Alert and oriented x3, no acute distress  Cardiovascular/pulmonary: No labored breathing, peripheral perfusion intact  Musculoskeletal:    Left Knee  Incision site is healed mature scar present.  Neurovascular sensation gross intact.   Range of motion 0-130.  Stable valgus varus stress testing.  Patella tracked midline.  Extensor mechanism intact.  Gait stable.  No weakness present on exam.    Imaging: reviewed    Assessment and Plan: Status post left niesha robotic assisted total Knee arthroplasty    Patient is roughly 7 weeks out from procedure listed above doing well.  Patient is progressing well with physical therapy. She will complete physical therapy and transition to home exercises to be performed independently.  Patient will begin resuming normal activities as tolerated.  Follow-up in 2 months for final imaging.      LUZ Yanes-CNP  Orthopedic Surgery   05/27/25  11:37 AM

## 2025-07-30 ENCOUNTER — OFFICE VISIT (OUTPATIENT)
Dept: ORTHOPEDIC SURGERY | Age: 60
End: 2025-07-30

## 2025-07-30 VITALS
TEMPERATURE: 97.6 F | DIASTOLIC BLOOD PRESSURE: 87 MMHG | BODY MASS INDEX: 42.16 KG/M2 | SYSTOLIC BLOOD PRESSURE: 121 MMHG | HEART RATE: 80 BPM | RESPIRATION RATE: 22 BRPM | HEIGHT: 67 IN | WEIGHT: 268.6 LBS | OXYGEN SATURATION: 94 %

## 2025-07-30 DIAGNOSIS — Z96.652 STATUS POST LEFT KNEE REPLACEMENT: Primary | ICD-10-CM

## 2025-07-30 NOTE — PROGRESS NOTES
[facility]   ORTHOPAEDIC SURGERY AND SPORTS MEDICINE  DATE OF VISIT: 07/30/25  Follow Up Post Operative Visit     CHIEF COMPLAINT:   Chief Complaint   Patient presents with    Post-Op Check     Patient presents today ~15 weeks post left Tarun robotic assisted total knee arthroplasty  Date: 4/15/2025  Reports no pain at her appointment today.       Subjective:    Melisa Don is here for follow up visit s/p above procedure.  She is doing well.  She has been back to all of her normal activities.  Has some occasional stiffness when sitting for long periods but otherwise doing great    Controlled Substances Monitoring:        Physical Exam:    Height: 1.702 m (5' 7\"), Weight - Scale: 121.8 kg (268 lb 9.6 oz), BP: 121/87    General: Alert and oriented x3, no acute distress  Cardiovascular/pulmonary: No labored breathing, peripheral perfusion intact  Musculoskeletal:    Exam the knee shows healed incision.  Range of motion 0 to 130 degrees.  The knee is stable      Imaging: X-rays show well aligned total knee arthroplasty    Assessment and Plan: 3 months out from left total knee arthroplasty  She is doing very well.  She will continue with activities as tolerated.  Follow-up as needed    Terence Mack MD  Orthopaedic Surgery   7/30/25  10:40 AM

## (undated) DEVICE — BLADE,STAINLESS-STEEL,11,STRL,DISPOSABLE: Brand: MEDLINE

## (undated) DEVICE — 4-PORT MANIFOLD: Brand: NEPTUNE 2

## (undated) DEVICE — COVER,BOOT,FOAM,NON-SKID,HOOK-LOOP,XLG: Brand: MEDLINE INDUSTRIES, INC.

## (undated) DEVICE — BANDAGE COMPR W6INXL12FT SMOOTH FOR LIMB EXSANG ESMARCH

## (undated) DEVICE — DRAPE,REIN 53X77,STERILE: Brand: MEDLINE

## (undated) DEVICE — TOWEL,OR,DSP,ST,BLUE,STD,6/PK,12PK/CS: Brand: MEDLINE

## (undated) DEVICE — SPONGE LAP W18XL18IN WHT COT 4 PLY FLD STRUNG RADPQ DISP ST 2 PER PACK

## (undated) DEVICE — 3M™ IOBAN™ 2 ANTIMICROBIAL INCISE DRAPE 6651EZ: Brand: IOBAN™ 2

## (undated) DEVICE — GLOVE SURG SZ 6 THK91MIL LTX FREE SYN POLYISOPRENE ANTI

## (undated) DEVICE — ZIMMER® STERILE DISPOSABLE TOURNIQUET CUFF WITH PLC, DUAL PORT, SINGLE BLADDER, 30 IN. (76 CM)

## (undated) DEVICE — 3M™ STERI-DRAPE™ U-DRAPE 1015: Brand: STERI-DRAPE™

## (undated) DEVICE — GAUZE,SPONGE,4"X4",8PLY,STRL,LF,10/TRAY: Brand: MEDLINE

## (undated) DEVICE — CANNULA ARTHSCP L7CM DIA7MM TRNSLUC THRD FLX W/ NO SQUIRT

## (undated) DEVICE — 3M™ COBAN™ NL STERILE NON-LATEX SELF-ADHERENT WRAP, 2084S, 4 IN X 5 YD (10 CM X 4,5 M), 18 ROLLS/CASE: Brand: 3M™ COBAN™

## (undated) DEVICE — DOUBLE BASIN SET: Brand: MEDLINE INDUSTRIES, INC.

## (undated) DEVICE — TUBING PMP L16FT MAIN DISP FOR AR-6400 AR-6475

## (undated) DEVICE — CANNULA ARTHSCP L5CM DIA8.25MM TRNSLUC THRD FLX W/ NO

## (undated) DEVICE — GLOVE ORTHO 8   MSG9480

## (undated) DEVICE — SUTURE TIGERTAPE TIGERWIRE SZ 2-0 L30IN NONABSORBABLE AR72377T

## (undated) DEVICE — APPLICATOR MEDICATED 26 CC SOLUTION HI LT ORNG CHLORAPREP

## (undated) DEVICE — PIN BNE FIX TEMP L140MM DIA4MM MAKO

## (undated) DEVICE — STRIP,CLOSURE,WOUND,MEDI-STRIP,1/2X4: Brand: MEDLINE

## (undated) DEVICE — NEEDLE SPNL L3.5IN PNK HUB S STL REG WALL FIT STYL W/ QNCKE

## (undated) DEVICE — SOL IRR SOD CHL 0.9% TITAN XL CNTNR 3000ML

## (undated) DEVICE — PACK PROCEDURE SURG GEN CUST

## (undated) DEVICE — NEEDLE HYPO 18GA L1.5IN PNK POLYPR HUB S STL REG BVL STR

## (undated) DEVICE — SURGICAL PROCEDURE PACK HND

## (undated) DEVICE — COVER DSG W7 7 8XL11IN WHT POR CLTH PRECUT WTRPRF MEDIPORE

## (undated) DEVICE — GOWN,SIRUS,FABRNF,XL,20/CS: Brand: MEDLINE

## (undated) DEVICE — TUBING, SUCTION, 9/32" X 10', STRAIGHT: Brand: MEDLINE

## (undated) DEVICE — BLADE SHV L13CM DIA4MM DBL CUT COOLCUT

## (undated) DEVICE — 1000 S-DRAPE TOWEL DRAPE 10/BX: Brand: STERI-DRAPE™

## (undated) DEVICE — DRAPE,TOP,102X53,STERILE: Brand: MEDLINE

## (undated) DEVICE — ALCOHOL RUBBING ISO 16OZ 70%

## (undated) DEVICE — KIT SURG W7XL11IN 2 PKT UNTREATED NA

## (undated) DEVICE — TAPE ADH W3INXL10YD WHT COT WVN BK POWERFUL RUB BASE HIGHLY

## (undated) DEVICE — DRAPE,U/ SHT,SPLIT,PLAS,STERIL: Brand: MEDLINE

## (undated) DEVICE — PIN BNE FIX TEMP L110MM DIA4MM MAKO

## (undated) DEVICE — IMMOBILIZER SHLDR L10.5-17IN D7IN SLNG W/ 15DEG ABD PLLW

## (undated) DEVICE — PADDING,UNDERCAST,COTTON, 4"X4YD STERILE: Brand: MEDLINE

## (undated) DEVICE — CANNULA ARTHSCP L9CM DIA8.25MM TRNSLUC THRD FLX W/ NO

## (undated) DEVICE — DRESSING,GAUZE,XEROFORM,CURAD,1"X8",ST: Brand: CURAD

## (undated) DEVICE — CANNULA ARTHSCP L5CM ID8MM DBL DAM 1 PC MOLD LO PROF FLNG

## (undated) DEVICE — DRESSING HYDROFIBER AQUACEL AG ADVANTAGE 3.5X6 IN

## (undated) DEVICE — BUR SHV L13CM DIA4MM 8 FLUT OVL FOR RAP AGG BNE RESECT

## (undated) DEVICE — KIT INT FIX FEM TIB CKPT MAKOPLASTY

## (undated) DEVICE — PROBE ABLAT XL 90DEG ASPIR BPLR RF 1 PC ELECTRD ERGO HNDL

## (undated) DEVICE — MASTISOL ADHESIVE LIQ 2/3ML

## (undated) DEVICE — SLEEVE TRAC SPANDEX LAT W/ 4IN COBAN SUPERFICIAL RAD NRV PD

## (undated) DEVICE — Device

## (undated) DEVICE — NEEDLE FLTR 18GA L1.5IN MEM THK5UM BLNT DISP

## (undated) DEVICE — GLOVE SURG SZ 8 CRM LTX FREE POLYISOPRENE POLYMER BEAD ANTI

## (undated) DEVICE — GLOVE SURG SZ 65 L12IN FNGR THK79MIL GRN LTX FREE

## (undated) DEVICE — PAD,ABDOMINAL,5"X9",ST,LF,25/BX: Brand: MEDLINE INDUSTRIES, INC.

## (undated) DEVICE — 3M™ IOBAN™ 2 ANTIMICROBIAL INCISE DRAPE 6640EZ: Brand: IOBAN™ 2

## (undated) DEVICE — NEEDLE SUT PASS FOR ROT CUF LABRAL REP MULTFI SCORPION

## (undated) DEVICE — SYRINGE MED 30ML STD CLR PLAS LUERLOCK TIP N CTRL DISP

## (undated) DEVICE — SOLUTION IRRIG 1000ML 0.9% SOD CHL USP POUR PLAS BTL

## (undated) DEVICE — DRAPE,SHOULDER,ORTHOMAX,W/POUCH,5/CS: Brand: MEDLINE

## (undated) DEVICE — DRESSING FOAM ADH POLYUR W/ SIL WND SHT 4IN LEN 4IN W

## (undated) DEVICE — PACK,SHOULDER,DRAPE,POUCH: Brand: MEDLINE

## (undated) DEVICE — HANDPIECE SET WITH COAXIAL HIGH FLOW TIP AND SUCTION TUBE: Brand: INTERPULSE

## (undated) DEVICE — STRYKER PERFORMANCE SERIES SAGITTAL BLADE: Brand: STRYKER PERFORMANCE SERIES

## (undated) DEVICE — NEEDLE,22GX1.5",REG,BEVEL: Brand: MEDLINE

## (undated) DEVICE — BLADE SHV L13CM DIA4MM EXCALIBUR AGG COOLCUT

## (undated) DEVICE — GLOVE ORANGE PI 8   MSG9080

## (undated) DEVICE — KIT DRP FOR RIO ROBOTIC ARM ASST SYS

## (undated) DEVICE — GLOVE SURG SZ 85 L12IN FNGR ORTHO 126MIL CRM LTX FREE

## (undated) DEVICE — KIT TRK KNEE PROC VIZADISC

## (undated) DEVICE — BNDG,ELSTC,MATRIX,STRL,6"X5YD,LF,HOOK&LP: Brand: MEDLINE

## (undated) DEVICE — SOLUTION IRRIG 3000ML LAC RINGERS ARTHROMTC PLAS CONT

## (undated) DEVICE — GOWN,SIRUS,POLYRNF,BRTHSLV,XLN/XL,20/CS: Brand: MEDLINE

## (undated) DEVICE — SUTURE SUTTAPE FIBERLINK 1.3MM WHT BLU CLS LOOP AR7535

## (undated) DEVICE — NEEDLE SCORPION HD MEGA LOADER

## (undated) DEVICE — BLADE SURG SAW S STL NAR OSC W/ SERR EDGE DISP

## (undated) DEVICE — GLOVE ORANGE PI 8 1/2   MSG9085

## (undated) DEVICE — ELECTRODE PT RET AD L9FT HI MOIST COND ADH HYDRGEL CORDED

## (undated) DEVICE — TUBING PMP L16FT MAIN DISP FOR AR-6400 AR-6475 Â€“ ORDER MULTIPLES OF 10 EACH

## (undated) DEVICE — GLOVE SURG SZ 8 L12IN FNGR THK79MIL GRN LTX FREE

## (undated) DEVICE — SOLUTION IRRIG 1000ML STRL H2O USP PLAS POUR BTL

## (undated) DEVICE — WIPES SKIN CLOTH READYPREP 9 X 10.5 IN 2% CHLORHEX GLUCONATE CHG PREOP

## (undated) DEVICE — SOLUTION IV IRRIG LACTATED RINGERS 3000ML 2B7487

## (undated) DEVICE — CORD RETRCT SIL - ORDER MULTIPLES OF 10 EACH

## (undated) DEVICE — SUTURE STRATAFIX SYMMETRIC SZ 1 L18IN ABSRB VLT CT1 L36CM SXPP1A404

## (undated) DEVICE — BOOT POS LEG DEMAYO

## (undated) DEVICE — SYRINGE,CONTROL,LL,FINGER,GRIP: Brand: MEDLINE INDUSTRIES, INC.

## (undated) DEVICE — ANTISEPTIC 16OZ H PEROX 1ST AID ORAL DEBRIDING AGNT

## (undated) DEVICE — CHLORAPREP 26ML ORANGE

## (undated) DEVICE — 2958 MEDIPORE PRE-CUT DSG CVR 20CMX28CM: Brand: 3M COMPANY

## (undated) DEVICE — LIQUIBAND RAPID ADHESIVE 36/CS 0.8ML: Brand: MEDLINE